# Patient Record
Sex: FEMALE | Race: WHITE | HISPANIC OR LATINO | URBAN - METROPOLITAN AREA
[De-identification: names, ages, dates, MRNs, and addresses within clinical notes are randomized per-mention and may not be internally consistent; named-entity substitution may affect disease eponyms.]

---

## 2017-06-13 ENCOUNTER — OUTPATIENT (OUTPATIENT)
Dept: OUTPATIENT SERVICES | Facility: HOSPITAL | Age: 52
LOS: 1 days | Discharge: HOME | End: 2017-06-13

## 2017-06-28 DIAGNOSIS — G56.01 CARPAL TUNNEL SYNDROME, RIGHT UPPER LIMB: ICD-10-CM

## 2017-06-28 DIAGNOSIS — Z01.818 ENCOUNTER FOR OTHER PREPROCEDURAL EXAMINATION: ICD-10-CM

## 2017-07-14 ENCOUNTER — OUTPATIENT (OUTPATIENT)
Dept: ADMINISTRATIVE | Facility: HOSPITAL | Age: 52
LOS: 1 days | Discharge: HOME | End: 2017-07-14

## 2017-07-18 DIAGNOSIS — G56.01 CARPAL TUNNEL SYNDROME, RIGHT UPPER LIMB: ICD-10-CM

## 2017-12-19 PROBLEM — Z00.00 ENCOUNTER FOR PREVENTIVE HEALTH EXAMINATION: Status: ACTIVE | Noted: 2017-12-19

## 2018-02-27 ENCOUNTER — OUTPATIENT (OUTPATIENT)
Dept: OUTPATIENT SERVICES | Facility: HOSPITAL | Age: 53
LOS: 1 days | Discharge: HOME | End: 2018-02-27

## 2018-02-27 VITALS
TEMPERATURE: 98 F | HEART RATE: 76 BPM | HEIGHT: 61 IN | RESPIRATION RATE: 16 BRPM | SYSTOLIC BLOOD PRESSURE: 112 MMHG | WEIGHT: 121.25 LBS | DIASTOLIC BLOOD PRESSURE: 68 MMHG | OXYGEN SATURATION: 99 %

## 2018-02-27 DIAGNOSIS — Z98.890 OTHER SPECIFIED POSTPROCEDURAL STATES: Chronic | ICD-10-CM

## 2018-02-27 LAB
ANION GAP SERPL CALC-SCNC: 8 MMOL/L — SIGNIFICANT CHANGE UP (ref 7–14)
BASOPHILS # BLD AUTO: 0.04 K/UL — SIGNIFICANT CHANGE UP (ref 0–0.2)
BASOPHILS NFR BLD AUTO: 0.6 % — SIGNIFICANT CHANGE UP (ref 0–1)
BUN SERPL-MCNC: 13 MG/DL — SIGNIFICANT CHANGE UP (ref 10–20)
CALCIUM SERPL-MCNC: 9.9 MG/DL — SIGNIFICANT CHANGE UP (ref 8.5–10.1)
CHLORIDE SERPL-SCNC: 105 MMOL/L — SIGNIFICANT CHANGE UP (ref 98–110)
CO2 SERPL-SCNC: 28 MMOL/L — SIGNIFICANT CHANGE UP (ref 17–32)
CREAT SERPL-MCNC: 0.7 MG/DL — SIGNIFICANT CHANGE UP (ref 0.7–1.5)
EOSINOPHIL # BLD AUTO: 0.14 K/UL — SIGNIFICANT CHANGE UP (ref 0–0.7)
EOSINOPHIL NFR BLD AUTO: 2.1 % — SIGNIFICANT CHANGE UP (ref 0–8)
GLUCOSE SERPL-MCNC: 82 MG/DL — SIGNIFICANT CHANGE UP (ref 70–110)
HCT VFR BLD CALC: 37.4 % — SIGNIFICANT CHANGE UP (ref 37–47)
HGB BLD-MCNC: 13 G/DL — LOW (ref 14–18)
IMM GRANULOCYTES NFR BLD AUTO: 0.2 % — SIGNIFICANT CHANGE UP (ref 0.1–0.3)
LYMPHOCYTES # BLD AUTO: 2.41 K/UL — SIGNIFICANT CHANGE UP (ref 1.2–3.4)
LYMPHOCYTES # BLD AUTO: 36.7 % — SIGNIFICANT CHANGE UP (ref 20.5–51.1)
MCHC RBC-ENTMCNC: 30.2 PG — SIGNIFICANT CHANGE UP (ref 27–31)
MCHC RBC-ENTMCNC: 34.8 G/DL — SIGNIFICANT CHANGE UP (ref 32–37)
MCV RBC AUTO: 87 FL — SIGNIFICANT CHANGE UP (ref 81–91)
MONOCYTES # BLD AUTO: 0.35 K/UL — SIGNIFICANT CHANGE UP (ref 0.1–0.6)
MONOCYTES NFR BLD AUTO: 5.3 % — SIGNIFICANT CHANGE UP (ref 1.7–9.3)
NEUTROPHILS # BLD AUTO: 3.62 K/UL — SIGNIFICANT CHANGE UP (ref 1.4–6.5)
NEUTROPHILS NFR BLD AUTO: 55.1 % — SIGNIFICANT CHANGE UP (ref 42.2–75.2)
NRBC # BLD: 0 /100 WBCS — SIGNIFICANT CHANGE UP (ref 0–0)
PLATELET # BLD AUTO: 242 K/UL — SIGNIFICANT CHANGE UP (ref 130–400)
POTASSIUM SERPL-MCNC: 4.1 MMOL/L — SIGNIFICANT CHANGE UP (ref 3.5–5)
POTASSIUM SERPL-SCNC: 4.1 MMOL/L — SIGNIFICANT CHANGE UP (ref 3.5–5)
RBC # BLD: 4.3 M/UL — SIGNIFICANT CHANGE UP (ref 4.2–5.4)
RBC # FLD: 12.4 % — SIGNIFICANT CHANGE UP (ref 11.5–14.5)
SODIUM SERPL-SCNC: 141 MMOL/L — SIGNIFICANT CHANGE UP (ref 135–146)
WBC # BLD: 6.57 K/UL — SIGNIFICANT CHANGE UP (ref 4.8–10.8)
WBC # FLD AUTO: 6.57 K/UL — SIGNIFICANT CHANGE UP (ref 4.8–10.8)

## 2018-02-27 NOTE — H&P PST ADULT - NSANTHOSAYNRD_GEN_A_CORE
No. BETTY screening performed.  STOP BANG Legend: 0-2 = LOW Risk; 3-4 = INTERMEDIATE Risk; 5-8 = HIGH Risk

## 2018-02-27 NOTE — H&P PST ADULT - REASON FOR ADMISSION
53 yr old female to past for left open carpal tunnel release  under iv sed with dr perry rt hand dominant s/p  rct 5/17 glenda well now for left   no fever no uti uti cp palp sob pain at this time ex glenda 3 fos [employer]  no david screen revd

## 2018-03-02 DIAGNOSIS — Z01.818 ENCOUNTER FOR OTHER PREPROCEDURAL EXAMINATION: ICD-10-CM

## 2018-03-07 ENCOUNTER — OUTPATIENT (OUTPATIENT)
Dept: OUTPATIENT SERVICES | Facility: HOSPITAL | Age: 53
LOS: 1 days | Discharge: HOME | End: 2018-03-07

## 2018-03-07 ENCOUNTER — APPOINTMENT (OUTPATIENT)
Dept: PLASTIC SURGERY | Facility: CLINIC | Age: 53
End: 2018-03-07

## 2018-03-07 VITALS
SYSTOLIC BLOOD PRESSURE: 106 MMHG | OXYGEN SATURATION: 96 % | DIASTOLIC BLOOD PRESSURE: 65 MMHG | HEART RATE: 73 BPM | RESPIRATION RATE: 17 BRPM

## 2018-03-07 VITALS
HEART RATE: 68 BPM | TEMPERATURE: 98 F | WEIGHT: 121.25 LBS | DIASTOLIC BLOOD PRESSURE: 55 MMHG | OXYGEN SATURATION: 98 % | HEIGHT: 61 IN | RESPIRATION RATE: 20 BRPM | SYSTOLIC BLOOD PRESSURE: 102 MMHG

## 2018-03-07 DIAGNOSIS — Z98.890 OTHER SPECIFIED POSTPROCEDURAL STATES: Chronic | ICD-10-CM

## 2018-03-07 RX ORDER — MORPHINE SULFATE 50 MG/1
2 CAPSULE, EXTENDED RELEASE ORAL
Qty: 0 | Refills: 0 | Status: DISCONTINUED | OUTPATIENT
Start: 2018-03-07 | End: 2018-03-07

## 2018-03-07 RX ORDER — IBUPROFEN 200 MG
1 TABLET ORAL
Qty: 20 | Refills: 0 | OUTPATIENT
Start: 2018-03-07

## 2018-03-07 RX ORDER — SODIUM CHLORIDE 9 MG/ML
1000 INJECTION, SOLUTION INTRAVENOUS
Qty: 0 | Refills: 0 | Status: DISCONTINUED | OUTPATIENT
Start: 2018-03-07 | End: 2018-03-22

## 2018-03-07 RX ORDER — ONDANSETRON 8 MG/1
4 TABLET, FILM COATED ORAL ONCE
Qty: 0 | Refills: 0 | Status: DISCONTINUED | OUTPATIENT
Start: 2018-03-07 | End: 2018-03-22

## 2018-03-07 RX ORDER — OXYCODONE AND ACETAMINOPHEN 5; 325 MG/1; MG/1
1 TABLET ORAL ONCE
Qty: 0 | Refills: 0 | Status: DISCONTINUED | OUTPATIENT
Start: 2018-03-07 | End: 2018-03-07

## 2018-03-07 RX ORDER — MEPERIDINE HYDROCHLORIDE 50 MG/ML
12.5 INJECTION INTRAMUSCULAR; INTRAVENOUS; SUBCUTANEOUS ONCE
Qty: 0 | Refills: 0 | Status: DISCONTINUED | OUTPATIENT
Start: 2018-03-07 | End: 2018-03-07

## 2018-03-07 RX ADMIN — SODIUM CHLORIDE 100 MILLILITER(S): 9 INJECTION, SOLUTION INTRAVENOUS at 12:02

## 2018-03-07 NOTE — BRIEF OPERATIVE NOTE - PROCEDURE
<<-----Click on this checkbox to enter Procedure Open release of left carpal tunnel  03/07/2018    Active  BENJAMIN

## 2018-03-07 NOTE — ASU DISCHARGE PLAN (ADULT/PEDIATRIC). - SPECIAL INSTRUCTIONS
DIET:    Resume normal diet  No alcoholic  beverages for 24 hours or if on prescribed narcotic pain medications.    MEDICATION:    Resume your preoperative oral medications.  Check with your physician before starting aspirin, Coumadin, or other blood thinners.  Prescriptions given to you - take as directed.      ACTIVITY:    Rest today and limit your activities for 24 hours.  Do not drive or operate machinery for 24 hours - if you received anesthesia.  When taking pain medication, be careful as you walk or climb stairs.  It is not advisable to drive while taking prescribed pain medication.    SPECIAL INSTRUCTIONS:    ___x__ Elevate operative site above heart level or as directed.  ___x__ Apply ice to operative site as directed.  _____ Use  sling as directed.  ___x__ Exercise fingers.    DRESSING CARE:    _x____ You may change the dressing 4 days. Keep wound covered with band-aids.  _____ Do not change the dressing until your doctor see you.  _____ You can loosen or rewrap the dressing.  _____  Keep dressing clean and dry.  _____ You may shower in _____ day(s) with the extremity covered by a plastic bag.  ___x__ OK to wash hand , including showers, in _____ day(s).    ADDITIONAL  INFORMATION:    Post operative visit should be scheduled for next week.  If you are not aware of visit please contact office.  If you have any questions or concerns call office at      Notify your doctor if you develop   Fever  Excessive Swelling  Chills   Drainage   Pain not controlled by medication  Persistent numbness in hand or fingers    If an Emergency arises call 911 and/or go to the Emergency Room

## 2018-03-12 DIAGNOSIS — G56.02 CARPAL TUNNEL SYNDROME, LEFT UPPER LIMB: ICD-10-CM

## 2019-04-17 ENCOUNTER — EMERGENCY (EMERGENCY)
Facility: HOSPITAL | Age: 54
LOS: 0 days | Discharge: HOME | End: 2019-04-18
Attending: EMERGENCY MEDICINE | Admitting: EMERGENCY MEDICINE
Payer: MEDICARE

## 2019-04-17 VITALS
HEART RATE: 69 BPM | OXYGEN SATURATION: 99 % | TEMPERATURE: 99 F | SYSTOLIC BLOOD PRESSURE: 103 MMHG | DIASTOLIC BLOOD PRESSURE: 57 MMHG | HEIGHT: 61 IN | WEIGHT: 130.07 LBS | RESPIRATION RATE: 18 BRPM

## 2019-04-17 DIAGNOSIS — J45.909 UNSPECIFIED ASTHMA, UNCOMPLICATED: ICD-10-CM

## 2019-04-17 DIAGNOSIS — K52.9 NONINFECTIVE GASTROENTERITIS AND COLITIS, UNSPECIFIED: ICD-10-CM

## 2019-04-17 DIAGNOSIS — Z79.891 LONG TERM (CURRENT) USE OF OPIATE ANALGESIC: ICD-10-CM

## 2019-04-17 DIAGNOSIS — Z87.39 PERSONAL HISTORY OF OTHER DISEASES OF THE MUSCULOSKELETAL SYSTEM AND CONNECTIVE TISSUE: ICD-10-CM

## 2019-04-17 DIAGNOSIS — Z98.890 OTHER SPECIFIED POSTPROCEDURAL STATES: Chronic | ICD-10-CM

## 2019-04-17 DIAGNOSIS — R10.9 UNSPECIFIED ABDOMINAL PAIN: ICD-10-CM

## 2019-04-17 DIAGNOSIS — Z79.2 LONG TERM (CURRENT) USE OF ANTIBIOTICS: ICD-10-CM

## 2019-04-17 DIAGNOSIS — Z79.1 LONG TERM (CURRENT) USE OF NON-STEROIDAL ANTI-INFLAMMATORIES (NSAID): ICD-10-CM

## 2019-04-17 LAB
ALBUMIN SERPL ELPH-MCNC: 4.9 G/DL — SIGNIFICANT CHANGE UP (ref 3.5–5.2)
ALP SERPL-CCNC: 73 U/L — SIGNIFICANT CHANGE UP (ref 30–115)
ALT FLD-CCNC: 14 U/L — SIGNIFICANT CHANGE UP (ref 0–41)
ANION GAP SERPL CALC-SCNC: 12 MMOL/L — SIGNIFICANT CHANGE UP (ref 7–14)
APPEARANCE UR: CLEAR — SIGNIFICANT CHANGE UP
AST SERPL-CCNC: 16 U/L — SIGNIFICANT CHANGE UP (ref 0–41)
BACTERIA # UR AUTO: ABNORMAL
BASOPHILS # BLD AUTO: 0.05 K/UL — SIGNIFICANT CHANGE UP (ref 0–0.2)
BASOPHILS NFR BLD AUTO: 0.9 % — SIGNIFICANT CHANGE UP (ref 0–1)
BILIRUB SERPL-MCNC: 0.2 MG/DL — SIGNIFICANT CHANGE UP (ref 0.2–1.2)
BILIRUB UR-MCNC: NEGATIVE — SIGNIFICANT CHANGE UP
BUN SERPL-MCNC: 17 MG/DL — SIGNIFICANT CHANGE UP (ref 10–20)
CALCIUM SERPL-MCNC: 10.4 MG/DL — HIGH (ref 8.5–10.1)
CHLORIDE SERPL-SCNC: 101 MMOL/L — SIGNIFICANT CHANGE UP (ref 98–110)
CO2 SERPL-SCNC: 31 MMOL/L — SIGNIFICANT CHANGE UP (ref 17–32)
COD CRY URNS QL: NEGATIVE — SIGNIFICANT CHANGE UP
COLOR SPEC: YELLOW — SIGNIFICANT CHANGE UP
CREAT SERPL-MCNC: 1 MG/DL — SIGNIFICANT CHANGE UP (ref 0.7–1.5)
DIFF PNL FLD: ABNORMAL
EOSINOPHIL # BLD AUTO: 0.23 K/UL — SIGNIFICANT CHANGE UP (ref 0–0.7)
EOSINOPHIL NFR BLD AUTO: 4.1 % — SIGNIFICANT CHANGE UP (ref 0–8)
EPI CELLS # UR: ABNORMAL /HPF
GLUCOSE SERPL-MCNC: 103 MG/DL — HIGH (ref 70–99)
GLUCOSE UR QL: NEGATIVE MG/DL — SIGNIFICANT CHANGE UP
GRAN CASTS # UR COMP ASSIST: NEGATIVE — SIGNIFICANT CHANGE UP
HCT VFR BLD CALC: 40.7 % — SIGNIFICANT CHANGE UP (ref 37–47)
HGB BLD-MCNC: 13.6 G/DL — SIGNIFICANT CHANGE UP (ref 12–16)
HYALINE CASTS # UR AUTO: NEGATIVE — SIGNIFICANT CHANGE UP
IMM GRANULOCYTES NFR BLD AUTO: 0.2 % — SIGNIFICANT CHANGE UP (ref 0.1–0.3)
KETONES UR-MCNC: NEGATIVE — SIGNIFICANT CHANGE UP
LEUKOCYTE ESTERASE UR-ACNC: ABNORMAL
LIDOCAIN IGE QN: 29 U/L — SIGNIFICANT CHANGE UP (ref 7–60)
LYMPHOCYTES # BLD AUTO: 2.23 K/UL — SIGNIFICANT CHANGE UP (ref 1.2–3.4)
LYMPHOCYTES # BLD AUTO: 39.6 % — SIGNIFICANT CHANGE UP (ref 20.5–51.1)
MCHC RBC-ENTMCNC: 29.8 PG — SIGNIFICANT CHANGE UP (ref 27–31)
MCHC RBC-ENTMCNC: 33.4 G/DL — SIGNIFICANT CHANGE UP (ref 32–37)
MCV RBC AUTO: 89.3 FL — SIGNIFICANT CHANGE UP (ref 81–99)
MONOCYTES # BLD AUTO: 0.33 K/UL — SIGNIFICANT CHANGE UP (ref 0.1–0.6)
MONOCYTES NFR BLD AUTO: 5.9 % — SIGNIFICANT CHANGE UP (ref 1.7–9.3)
NEUTROPHILS # BLD AUTO: 2.78 K/UL — SIGNIFICANT CHANGE UP (ref 1.4–6.5)
NEUTROPHILS NFR BLD AUTO: 49.3 % — SIGNIFICANT CHANGE UP (ref 42.2–75.2)
NITRITE UR-MCNC: NEGATIVE — SIGNIFICANT CHANGE UP
NRBC # BLD: 0 /100 WBCS — SIGNIFICANT CHANGE UP (ref 0–0)
PH UR: 5.5 — SIGNIFICANT CHANGE UP (ref 5–8)
PLATELET # BLD AUTO: 243 K/UL — SIGNIFICANT CHANGE UP (ref 130–400)
POTASSIUM SERPL-MCNC: 3.9 MMOL/L — SIGNIFICANT CHANGE UP (ref 3.5–5)
POTASSIUM SERPL-SCNC: 3.9 MMOL/L — SIGNIFICANT CHANGE UP (ref 3.5–5)
PROT SERPL-MCNC: 7.9 G/DL — SIGNIFICANT CHANGE UP (ref 6–8)
PROT UR-MCNC: NEGATIVE MG/DL — SIGNIFICANT CHANGE UP
RBC # BLD: 4.56 M/UL — SIGNIFICANT CHANGE UP (ref 4.2–5.4)
RBC # FLD: 12.6 % — SIGNIFICANT CHANGE UP (ref 11.5–14.5)
RBC CASTS # UR COMP ASSIST: SIGNIFICANT CHANGE UP /HPF
SODIUM SERPL-SCNC: 144 MMOL/L — SIGNIFICANT CHANGE UP (ref 135–146)
SP GR SPEC: >=1.03 (ref 1.01–1.03)
TRI-PHOS CRY UR QL COMP ASSIST: NEGATIVE — SIGNIFICANT CHANGE UP
URATE CRY FLD QL MICRO: NEGATIVE — SIGNIFICANT CHANGE UP
UROBILINOGEN FLD QL: 0.2 MG/DL — SIGNIFICANT CHANGE UP (ref 0.2–0.2)
WBC # BLD: 5.63 K/UL — SIGNIFICANT CHANGE UP (ref 4.8–10.8)
WBC # FLD AUTO: 5.63 K/UL — SIGNIFICANT CHANGE UP (ref 4.8–10.8)
WBC UR QL: ABNORMAL /HPF

## 2019-04-17 PROCEDURE — 99285 EMERGENCY DEPT VISIT HI MDM: CPT

## 2019-04-17 RX ORDER — MORPHINE SULFATE 50 MG/1
4 CAPSULE, EXTENDED RELEASE ORAL ONCE
Qty: 0 | Refills: 0 | Status: DISCONTINUED | OUTPATIENT
Start: 2019-04-17 | End: 2019-04-17

## 2019-04-17 RX ADMIN — MORPHINE SULFATE 4 MILLIGRAM(S): 50 CAPSULE, EXTENDED RELEASE ORAL at 22:12

## 2019-04-17 NOTE — ED ADULT NURSE NOTE - TEMPLATE
AP STANDING VIEW OF BOTH KNEES; 3 VIEWS RIGHT KNEE



CLINICAL HISTORY: Chronic right knee pain.



FINDINGS: An AP standing view of both knees with lateral, tunnel, and sunrise

views of the right knee are compared to study dated 4/13/2015. The skeletal

structures are osteopenic. No fracture is seen. There is moderate to advanced

tricompartmental degenerative joint space narrowing. This is greatest in the

medial and patellofemoral compartments. There are large marginal osteophytes and

patellar enthesophytes. There is no osteochondral defect seen on the tunnel

image. A small joint effusion is suspected. Prepatellar soft tissue swelling is

noted. Survey images of the left knee on the frontal view show moderate

narrowing in the medial compartment and mild narrowing the lateral compartment

as well as marginal osteophytes.



IMPRESSION:



1. Small joint effusion and soft tissue swelling with no acute bony abnormality

seen in the right knee.



2. Osteopenia and arthritic change as above. This has modestly progressed from

the 2015 examination.



3. Arthritic change is also seen in the left knee on the frontal view.







Electronically signed by:  Jatinder Salazar M.D.

9/7/2017 4:02 PM



Dictated Date/Time:  9/7/2017 3:59 PM Abdominal Pain, N/V/D

## 2019-04-17 NOTE — ED PROVIDER NOTE - ATTENDING CONTRIBUTION TO CARE
52 yo F PMHx chronic pain presents with c/o left sided abd pain on and off x few days.  Pain comes and goes and feels sharp at times, no fever or chills, no n/v, + loose stools.  no urinary complaints.  no travel, no trauma.  On exam pt in NAD AAO x 3, MMM and pink, Lungs CAT B/L no wrr, no rash, Abd soft + BS + tender left abdomen, no edema, seen ambulate with steady gait.

## 2019-04-17 NOTE — ED PROVIDER NOTE - NSFOLLOWUPINSTRUCTIONS_ED_ALL_ED_FT
Colitis  ImageColitis is inflammation of the colon. Colitis may last a short time (acute) or it may last a long time (chronic).    What are the causes?  This condition may be caused by:    Viruses.  Bacteria.  Reactions to medicine.  Certain autoimmune diseases, such as Crohn disease or ulcerative colitis.    What are the signs or symptoms?  Symptoms of this condition include:    Diarrhea.  Passing bloody or tarry stool.  Pain.  Fever.  Vomiting.  Tiredness (fatigue).  Weight loss.  Bloating.  Sudden increase in abdominal pain.  Having fewer bowel movements than usual.    How is this diagnosed?  This condition is diagnosed with a stool test or a blood test. You may also have other tests, including X-rays, a CT scan, or a colonoscopy.    How is this treated?  Treatment may include:    Resting the bowel. This involves not eating or drinking for a period of time.  Fluids that are given through an IV tube.  Medicine for pain and diarrhea.  Antibiotic medicines.  Cortisone medicines.  Surgery.    Follow these instructions at home:  Eating and drinking     Follow instructions from your health care provider about eating or drinking restrictions.  Drink enough fluid to keep your urine clear or pale yellow.  Work with a dietitian to determine which foods cause your condition to flare up.  Avoid foods that cause flare-ups.  Eat a well-balanced diet.  Medicines     Take over-the-counter and prescription medicines only as told by your health care provider.  If you were prescribed an antibiotic medicine, take it as told by your health care provider. Do not stop taking the antibiotic even if you start to feel better.  General instructions     Keep all follow-up visits as told by your health care provider. This is important.  Contact a health care provider if:  Your symptoms do not go away.  You develop new symptoms.  Get help right away if:  You have a fever that does not go away with treatment.  You develop chills.  You have extreme weakness, fainting, or dehydration.  You have repeated vomiting.  You develop severe pain in your abdomen.  You pass bloody or tarry stool.  This information is not intended to replace advice given to you by your health care provider. Make sure you discuss any questions you have with your health care provider

## 2019-04-17 NOTE — ED PROVIDER NOTE - PHYSICAL EXAMINATION
--EXAM--  VITAL SIGNS: I have reviewed vs documented at present.  CONSTITUTIONAL: Well-developed; well-nourished; in no acute distress.   SKIN: Warm and dry, no acute rash.   HEAD: Normocephalic; atraumatic.  EYES: PERRL, EOM intact; conjunctiva and sclera clear. No nystagmus.  ENT: No nasal discharge; airway clear.  NECK: Supple; non tender.  CARD: S1, S2, Regular rate and rhythm.   RESP: No wheezes, rales or rhonchi.  ABD: Normal bowel sounds; soft; non-distended; tender left side   EXT: Normal ROM.   NEURO: Alert, oriented, grossly unremarkable. Strength 5/5 in all extremities. Sensation intact throughout.  PSYCH: Cooperative, appropriate.

## 2019-04-17 NOTE — ED PROVIDER NOTE - OBJECTIVE STATEMENT
this is a 52 yo female presenting to ed for evaluation of abdominal pain.  patient states she has had pain on left side for past couple of days. patient states pain is intermittent in nature. patient denies any nausea or vomiting. patient admits to couple episodes of loose stool

## 2019-04-17 NOTE — ED PROVIDER NOTE - CLINICAL SUMMARY MEDICAL DECISION MAKING FREE TEXT BOX
pt pw  llq pain -  labs wnl -  CT with  colitis-   po abx jessica  discussed and pritned results   - mervat castillo given  Patient to be discharged from ED. Any available test results were discussed with and printed  for patient.  Verbal instructions given, including instructions to return to ED immediately for any new, worsening, or concerning symptoms. Patient endorsed understanding. Written discharge instructions additionally given, including follow-up plan.  pt well appearing dcd in stable condition  feeling better

## 2019-04-17 NOTE — ED ADULT NURSE NOTE - NSIMPLEMENTINTERV_GEN_ALL_ED
Implemented All Universal Safety Interventions:  [place] to call system. Call bell, personal items and telephone within reach. Instruct patient to call for assistance. Room bathroom lighting operational. Non-slip footwear when patient is off stretcher. Physically safe environment: no spills, clutter or unnecessary equipment. Stretcher in lowest position, wheels locked, appropriate side rails in place.

## 2019-04-17 NOTE — ED PROVIDER NOTE - CARE PROVIDER_API CALL
Jake Piedra (DO)  Gastroenterology  [address] [place] [id number]  Phone: [phone]  Fax: [phone]  Follow Up Time:

## 2019-04-18 VITALS
TEMPERATURE: 98 F | SYSTOLIC BLOOD PRESSURE: 112 MMHG | RESPIRATION RATE: 18 BRPM | HEART RATE: 72 BPM | OXYGEN SATURATION: 98 % | DIASTOLIC BLOOD PRESSURE: 62 MMHG

## 2019-04-18 PROBLEM — G56.02 CARPAL TUNNEL SYNDROME, LEFT UPPER LIMB: Chronic | Status: ACTIVE | Noted: 2018-02-27

## 2019-04-18 PROBLEM — J45.909 UNSPECIFIED ASTHMA, UNCOMPLICATED: Chronic | Status: ACTIVE | Noted: 2018-02-27

## 2019-04-18 PROCEDURE — 74177 CT ABD & PELVIS W/CONTRAST: CPT | Mod: 26

## 2019-04-18 RX ORDER — MOXIFLOXACIN HYDROCHLORIDE TABLETS, 400 MG 400 MG/1
1 TABLET, FILM COATED ORAL
Qty: 20 | Refills: 0 | OUTPATIENT
Start: 2019-04-18 | End: 2019-04-27

## 2019-04-18 RX ORDER — METRONIDAZOLE 500 MG
1 TABLET ORAL
Qty: 21 | Refills: 0 | OUTPATIENT
Start: 2019-04-18 | End: 2019-04-24

## 2019-04-18 RX ORDER — CIPROFLOXACIN LACTATE 400MG/40ML
400 VIAL (ML) INTRAVENOUS ONCE
Qty: 0 | Refills: 0 | Status: DISCONTINUED | OUTPATIENT
Start: 2019-04-18 | End: 2019-04-18

## 2019-04-18 RX ORDER — METRONIDAZOLE 500 MG
500 TABLET ORAL ONCE
Qty: 0 | Refills: 0 | Status: COMPLETED | OUTPATIENT
Start: 2019-04-18 | End: 2019-04-18

## 2019-04-18 RX ORDER — CIPROFLOXACIN LACTATE 400MG/40ML
500 VIAL (ML) INTRAVENOUS ONCE
Qty: 0 | Refills: 0 | Status: COMPLETED | OUTPATIENT
Start: 2019-04-18 | End: 2019-04-18

## 2019-04-18 RX ADMIN — Medication 500 MILLIGRAM(S): at 02:24

## 2019-04-18 RX ADMIN — Medication 500 MILLIGRAM(S): at 02:31

## 2019-04-18 NOTE — ED POST DISCHARGE NOTE - RESULT SUMMARY
CT ABD- DR WILKS CALLED- ADDENDUM TO REPORT: DOES NOT THINKS PATIENT HAS COLITIS. THINKS ITS EPIPLOIC APPENDAGITIS. PATIENT ON ABX, WILL DISCUSS WITH DR. PAZ.

## 2019-04-19 LAB
CULTURE RESULTS: SIGNIFICANT CHANGE UP
SPECIMEN SOURCE: SIGNIFICANT CHANGE UP

## 2019-05-06 ENCOUNTER — TRANSCRIPTION ENCOUNTER (OUTPATIENT)
Age: 54
End: 2019-05-06

## 2019-05-06 ENCOUNTER — OUTPATIENT (OUTPATIENT)
Dept: OUTPATIENT SERVICES | Facility: HOSPITAL | Age: 54
LOS: 1 days | Discharge: HOME | End: 2019-05-06

## 2019-05-06 VITALS
DIASTOLIC BLOOD PRESSURE: 75 MMHG | RESPIRATION RATE: 18 BRPM | TEMPERATURE: 98 F | HEIGHT: 61 IN | SYSTOLIC BLOOD PRESSURE: 106 MMHG | WEIGHT: 130.07 LBS | HEART RATE: 74 BPM

## 2019-05-06 VITALS — SYSTOLIC BLOOD PRESSURE: 109 MMHG | DIASTOLIC BLOOD PRESSURE: 76 MMHG | HEART RATE: 72 BPM | RESPIRATION RATE: 18 BRPM

## 2019-05-06 DIAGNOSIS — Z98.890 OTHER SPECIFIED POSTPROCEDURAL STATES: Chronic | ICD-10-CM

## 2019-05-06 NOTE — ASU DISCHARGE PLAN (ADULT/PEDIATRIC) - CALL YOUR DOCTOR IF YOU HAVE ANY OF THE FOLLOWING:
Pain not relieved by Medications/Inability to tolerate liquids or foods/Bleeding that does not stop/Nausea and vomiting that does not stop

## 2019-05-06 NOTE — H&P PST ADULT - ASSESSMENT
54 yo F with PMH mentioned here for Screening colonoscopy.   Risks and benefits discussed with the patient.   Will proceed with the scheduled case.

## 2019-05-09 DIAGNOSIS — Z12.11 ENCOUNTER FOR SCREENING FOR MALIGNANT NEOPLASM OF COLON: ICD-10-CM

## 2019-05-09 DIAGNOSIS — K64.8 OTHER HEMORRHOIDS: ICD-10-CM

## 2019-05-09 DIAGNOSIS — J45.909 UNSPECIFIED ASTHMA, UNCOMPLICATED: ICD-10-CM

## 2019-05-09 DIAGNOSIS — Z87.891 PERSONAL HISTORY OF NICOTINE DEPENDENCE: ICD-10-CM

## 2019-07-01 ENCOUNTER — EMERGENCY (EMERGENCY)
Facility: HOSPITAL | Age: 54
LOS: 0 days | Discharge: HOME | End: 2019-07-02
Attending: EMERGENCY MEDICINE | Admitting: EMERGENCY MEDICINE
Payer: MEDICARE

## 2019-07-01 VITALS
SYSTOLIC BLOOD PRESSURE: 101 MMHG | HEART RATE: 65 BPM | HEIGHT: 61 IN | OXYGEN SATURATION: 100 % | WEIGHT: 128.97 LBS | RESPIRATION RATE: 18 BRPM | TEMPERATURE: 97 F | DIASTOLIC BLOOD PRESSURE: 50 MMHG

## 2019-07-01 DIAGNOSIS — R11.2 NAUSEA WITH VOMITING, UNSPECIFIED: ICD-10-CM

## 2019-07-01 DIAGNOSIS — Z79.891 LONG TERM (CURRENT) USE OF OPIATE ANALGESIC: ICD-10-CM

## 2019-07-01 DIAGNOSIS — Z79.899 OTHER LONG TERM (CURRENT) DRUG THERAPY: ICD-10-CM

## 2019-07-01 DIAGNOSIS — N20.0 CALCULUS OF KIDNEY: ICD-10-CM

## 2019-07-01 DIAGNOSIS — Z98.890 OTHER SPECIFIED POSTPROCEDURAL STATES: Chronic | ICD-10-CM

## 2019-07-01 DIAGNOSIS — R10.9 UNSPECIFIED ABDOMINAL PAIN: ICD-10-CM

## 2019-07-01 LAB
ALBUMIN SERPL ELPH-MCNC: 4.6 G/DL — SIGNIFICANT CHANGE UP (ref 3.5–5.2)
ALP SERPL-CCNC: 64 U/L — SIGNIFICANT CHANGE UP (ref 30–115)
ALT FLD-CCNC: 16 U/L — SIGNIFICANT CHANGE UP (ref 0–41)
ANION GAP SERPL CALC-SCNC: 12 MMOL/L — SIGNIFICANT CHANGE UP (ref 7–14)
APPEARANCE UR: CLEAR — SIGNIFICANT CHANGE UP
AST SERPL-CCNC: 23 U/L — SIGNIFICANT CHANGE UP (ref 0–41)
BASOPHILS # BLD AUTO: 0.03 K/UL — SIGNIFICANT CHANGE UP (ref 0–0.2)
BASOPHILS NFR BLD AUTO: 0.3 % — SIGNIFICANT CHANGE UP (ref 0–1)
BILIRUB SERPL-MCNC: 0.6 MG/DL — SIGNIFICANT CHANGE UP (ref 0.2–1.2)
BILIRUB UR-MCNC: NEGATIVE — SIGNIFICANT CHANGE UP
BUN SERPL-MCNC: 17 MG/DL — SIGNIFICANT CHANGE UP (ref 10–20)
CALCIUM SERPL-MCNC: 9.2 MG/DL — SIGNIFICANT CHANGE UP (ref 8.5–10.1)
CHLORIDE SERPL-SCNC: 103 MMOL/L — SIGNIFICANT CHANGE UP (ref 98–110)
CO2 SERPL-SCNC: 24 MMOL/L — SIGNIFICANT CHANGE UP (ref 17–32)
COLOR SPEC: YELLOW — SIGNIFICANT CHANGE UP
CREAT SERPL-MCNC: 1 MG/DL — SIGNIFICANT CHANGE UP (ref 0.7–1.5)
DIFF PNL FLD: ABNORMAL
EOSINOPHIL # BLD AUTO: 0.04 K/UL — SIGNIFICANT CHANGE UP (ref 0–0.7)
EOSINOPHIL NFR BLD AUTO: 0.4 % — SIGNIFICANT CHANGE UP (ref 0–8)
GLUCOSE SERPL-MCNC: 99 MG/DL — SIGNIFICANT CHANGE UP (ref 70–99)
GLUCOSE UR QL: NEGATIVE MG/DL — SIGNIFICANT CHANGE UP
HCT VFR BLD CALC: 39.4 % — SIGNIFICANT CHANGE UP (ref 37–47)
HGB BLD-MCNC: 13 G/DL — SIGNIFICANT CHANGE UP (ref 12–16)
IMM GRANULOCYTES NFR BLD AUTO: 0.4 % — HIGH (ref 0.1–0.3)
KETONES UR-MCNC: 15
LEUKOCYTE ESTERASE UR-ACNC: NEGATIVE — SIGNIFICANT CHANGE UP
LYMPHOCYTES # BLD AUTO: 1.23 K/UL — SIGNIFICANT CHANGE UP (ref 1.2–3.4)
LYMPHOCYTES # BLD AUTO: 11.6 % — LOW (ref 20.5–51.1)
MCHC RBC-ENTMCNC: 29.5 PG — SIGNIFICANT CHANGE UP (ref 27–31)
MCHC RBC-ENTMCNC: 33 G/DL — SIGNIFICANT CHANGE UP (ref 32–37)
MCV RBC AUTO: 89.5 FL — SIGNIFICANT CHANGE UP (ref 81–99)
MONOCYTES # BLD AUTO: 0.48 K/UL — SIGNIFICANT CHANGE UP (ref 0.1–0.6)
MONOCYTES NFR BLD AUTO: 4.5 % — SIGNIFICANT CHANGE UP (ref 1.7–9.3)
NEUTROPHILS # BLD AUTO: 8.8 K/UL — HIGH (ref 1.4–6.5)
NEUTROPHILS NFR BLD AUTO: 82.8 % — HIGH (ref 42.2–75.2)
NITRITE UR-MCNC: NEGATIVE — SIGNIFICANT CHANGE UP
NRBC # BLD: 0 /100 WBCS — SIGNIFICANT CHANGE UP (ref 0–0)
PH UR: 6 — SIGNIFICANT CHANGE UP (ref 5–8)
PLATELET # BLD AUTO: 223 K/UL — SIGNIFICANT CHANGE UP (ref 130–400)
POTASSIUM SERPL-MCNC: 4.5 MMOL/L — SIGNIFICANT CHANGE UP (ref 3.5–5)
POTASSIUM SERPL-SCNC: 4.5 MMOL/L — SIGNIFICANT CHANGE UP (ref 3.5–5)
PROT SERPL-MCNC: 7 G/DL — SIGNIFICANT CHANGE UP (ref 6–8)
PROT UR-MCNC: ABNORMAL MG/DL
RBC # BLD: 4.4 M/UL — SIGNIFICANT CHANGE UP (ref 4.2–5.4)
RBC # FLD: 12.2 % — SIGNIFICANT CHANGE UP (ref 11.5–14.5)
SODIUM SERPL-SCNC: 139 MMOL/L — SIGNIFICANT CHANGE UP (ref 135–146)
SP GR SPEC: 1.01 — SIGNIFICANT CHANGE UP (ref 1.01–1.03)
UROBILINOGEN FLD QL: 0.2 MG/DL — SIGNIFICANT CHANGE UP (ref 0.2–0.2)
WBC # BLD: 10.62 K/UL — SIGNIFICANT CHANGE UP (ref 4.8–10.8)
WBC # FLD AUTO: 10.62 K/UL — SIGNIFICANT CHANGE UP (ref 4.8–10.8)

## 2019-07-01 PROCEDURE — 74177 CT ABD & PELVIS W/CONTRAST: CPT | Mod: 26

## 2019-07-01 PROCEDURE — 99285 EMERGENCY DEPT VISIT HI MDM: CPT

## 2019-07-01 RX ORDER — ONDANSETRON 8 MG/1
8 TABLET, FILM COATED ORAL ONCE
Refills: 0 | Status: COMPLETED | OUTPATIENT
Start: 2019-07-01 | End: 2019-07-01

## 2019-07-01 RX ORDER — METOCLOPRAMIDE HCL 10 MG
10 TABLET ORAL ONCE
Refills: 0 | Status: COMPLETED | OUTPATIENT
Start: 2019-07-01 | End: 2019-07-01

## 2019-07-01 RX ORDER — KETOROLAC TROMETHAMINE 30 MG/ML
15 SYRINGE (ML) INJECTION ONCE
Refills: 0 | Status: DISCONTINUED | OUTPATIENT
Start: 2019-07-01 | End: 2019-07-01

## 2019-07-01 RX ORDER — SODIUM CHLORIDE 9 MG/ML
1000 INJECTION, SOLUTION INTRAVENOUS ONCE
Refills: 0 | Status: COMPLETED | OUTPATIENT
Start: 2019-07-01 | End: 2019-07-01

## 2019-07-01 RX ORDER — MORPHINE SULFATE 50 MG/1
8 CAPSULE, EXTENDED RELEASE ORAL ONCE
Refills: 0 | Status: DISCONTINUED | OUTPATIENT
Start: 2019-07-01 | End: 2019-07-01

## 2019-07-01 RX ADMIN — Medication 10 MILLIGRAM(S): at 22:46

## 2019-07-01 RX ADMIN — SODIUM CHLORIDE 1000 MILLILITER(S): 9 INJECTION, SOLUTION INTRAVENOUS at 21:48

## 2019-07-01 RX ADMIN — SODIUM CHLORIDE 1000 MILLILITER(S): 9 INJECTION, SOLUTION INTRAVENOUS at 20:48

## 2019-07-01 RX ADMIN — Medication 15 MILLIGRAM(S): at 21:19

## 2019-07-01 RX ADMIN — MORPHINE SULFATE 8 MILLIGRAM(S): 50 CAPSULE, EXTENDED RELEASE ORAL at 23:00

## 2019-07-01 RX ADMIN — MORPHINE SULFATE 8 MILLIGRAM(S): 50 CAPSULE, EXTENDED RELEASE ORAL at 22:47

## 2019-07-01 RX ADMIN — Medication 15 MILLIGRAM(S): at 20:49

## 2019-07-01 RX ADMIN — ONDANSETRON 8 MILLIGRAM(S): 8 TABLET, FILM COATED ORAL at 20:48

## 2019-07-01 NOTE — ED ADULT NURSE NOTE - OBJECTIVE STATEMENT
Patient presents today with complaints of right flank pain, alert and oriented x 4, respirations are even and unlabored, S1, S2 regular, abdomen is soft , right lower quadrant tender to palpation, 18 gauge saline lock placed on left AC, blood drawn and sent. will follow up.

## 2019-07-01 NOTE — ED ADULT TRIAGE NOTE - CHIEF COMPLAINT QUOTE
pt biba for right flank pain that started a few hours ago, radiates to rlq abd, no hx of kidney stones, pt took percocet pta, takes for chronic back pain, pt has n/v

## 2019-07-01 NOTE — ED ADULT NURSE NOTE - CHPI ED NUR SYMPTOMS NEG
no fever/no abdominal distension/no dysuria/no burning urination/no hematuria/no chills/no blood in stool/no diarrhea

## 2019-07-01 NOTE — ED PROVIDER NOTE - NSFOLLOWUPCLINICS_GEN_ALL_ED_FT
[facility]  Urology  .  [place]   Phone: [phone]  Fax:   Follow Up Time:

## 2019-07-01 NOTE — ED PROVIDER NOTE - PHYSICAL EXAMINATION
Vital Signs: I have reviewed the initial vital signs.  Constitutional: well-nourished, appears stated age, (+) severe painful and emotional distress  HEENT: Airway patent, moist MM, no erythema/swelling/deformity of oral structures. EOMI, PERRLA.  CV: regular rate, regular rhythm, well-perfused extremities, 2+ b/l DP and radial pulses equal.  Lungs: BCTA, no increased WOB.  ABD: NTND, no guarding or rebound, no pulsatile mass, no hernias. (+) right CVA tenderness  MSK: Neck supple, nontender, nl ROM, no stepoff. Chest nontender. Back nontender in TLS spine or to b/l bony structures or flanks. Ext nontender, nl rom, no deformity.   INTEG: Skin warm, dry, no rash.  NEURO: A&Ox3, normal strength, nl sensation throughout, normal speech.   PSYCH: Calm, cooperative, normal affect and interaction.

## 2019-07-01 NOTE — ED PROVIDER NOTE - PROGRESS NOTE DETAILS
Patient seen and evaluated by me. Labs/imaging ordered. Started on 1L IVF, given 15mg toradol and 8mg zofran IV. Will re-eval. Pain/nausea controlled at this time. Labs/imaging pending. Pain and nausea recurred. Ordered reglan and morphine. CT and labs pending. UA negative for infection. Pain controlled at this time. Will discharge. Noted patient on meloxicam - states she will not take the meloxicam while on ketorolac PRN pain for nephrolithiasis.

## 2019-07-01 NOTE — ED PROVIDER NOTE - NS ED ROS FT
Constitutional: (-) fever  Eyes/ENT: (-) blurry vision, (-) epistaxis  Cardiovascular: (-) chest pain, (-) syncope  Respiratory: (-) cough, (-) shortness of breath  Gastrointestinal: (+) vomiting, (-) diarrhea  Musculoskeletal: (-) neck pain, (+) back pain, (-) joint pain  Integumentary: (-) rash, (-) edema  Neurological: (-) headache, (-) altered mental status  Psychiatric: (-) hallucinations  Allergic/Immunologic: (-) pruritus

## 2019-07-01 NOTE — ED ADULT NURSE NOTE - NS ED PATIENT SAFETY CONCERN
Alert and oriented x 4. Pt received to intake spot 5 due to dizziness. Pt states : " I had this dizziness on and  off for a month but this week it has gotten worse. I am also nauseous on and off too." Pt denies chest pain, shortness of breath, nausea, vomiting or dizziness. IV 20g to left AC. Labs drawn. VSS. Will continue to monitor.
No

## 2019-07-01 NOTE — ED PROVIDER NOTE - CLINICAL SUMMARY MEDICAL DECISION MAKING FREE TEXT BOX
Patient presented with right flank pain radiating to the groin associated with nausea and vomiting. Otherwise afebrile, HD stable. Obtained labs which were grossly unremarkable. UA negative for infection. CT abd/pelvis showed small nephrolithiasis which will likely spontaneously pass. Patient given toradol followed by morphine, IVF, zofran and reglan for symptomatic control with relief of symptoms. After work up and treatment patient ambulatory, tolerates PO. Agrees to follow up with urology as given in dc instructions. Agrees to return to ED for any new or worsening symptoms.

## 2019-07-01 NOTE — ED PROVIDER NOTE - NSFOLLOWUPINSTRUCTIONS_ED_ALL_ED_FT
Kidney Stones    Kidney stones (urolithiasis) are deposits that form inside your kidneys. The intense pain is caused by the stone moving through the urinary tract. When the stone moves, the ureter goes into spasm around the stone. The stone is usually passed in the urine. Symptoms include abdominal, side, or back pain, nausea, vomiting, blood in the urine, frequency with urination. Drink enough water and fluids to keep your urine clear or pale yellow. This will help you to pass the stone or stone fragments.    SEEK IMMEDIATE MEDICAL CARE IF YOU HAVE THE FOLLOWING SYMPTOMS: pain not controlled with medication, fever/chills, worsening vomiting, inability to urinate, or dizziness/lightheadedness.

## 2019-07-01 NOTE — ED PROVIDER NOTE - OBJECTIVE STATEMENT
54 year old female, pmhx asthma, carpal tunnel syndrome, presenting with a 1 day history of severe right flank pain radiating to her groin described as sharp, 10/10, constant, no palliative or provocative factors. Denies having this before and denies personal or familial history of nephrolithiasis. Also endorses nausea with several episodes of NBNB vomiting associated with the pain. Otherwise denies fevers, headache, vision changes, weakness/numbness, confusion, URI symptoms, neck pain, chest pain, dyspnea, cough, palpitations, abdominal pain, diarrhea, constipation, blood in stool/dark stools, urinary symptoms, vaginal bleeding/discharge, leg swelling, rash, recent travel or sick contacts.

## 2019-07-02 LAB
BACTERIA # UR AUTO: ABNORMAL
COMMENT - URINE: SIGNIFICANT CHANGE UP
EPI CELLS # UR: ABNORMAL /HPF
RBC CASTS # UR COMP ASSIST: ABNORMAL /HPF
WBC UR QL: SIGNIFICANT CHANGE UP /HPF

## 2019-07-02 RX ORDER — KETOROLAC TROMETHAMINE 30 MG/ML
1 SYRINGE (ML) INJECTION
Qty: 20 | Refills: 0
Start: 2019-07-02 | End: 2019-07-06

## 2019-07-02 RX ORDER — TAMSULOSIN HYDROCHLORIDE 0.4 MG/1
1 CAPSULE ORAL
Qty: 7 | Refills: 0
Start: 2019-07-02 | End: 2019-07-08

## 2020-01-22 ENCOUNTER — APPOINTMENT (OUTPATIENT)
Dept: ORTHOPEDIC SURGERY | Facility: CLINIC | Age: 55
End: 2020-01-22
Payer: MEDICARE

## 2020-01-22 DIAGNOSIS — Z87.09 PERSONAL HISTORY OF OTHER DISEASES OF THE RESPIRATORY SYSTEM: ICD-10-CM

## 2020-01-22 DIAGNOSIS — M25.562 PAIN IN LEFT KNEE: ICD-10-CM

## 2020-01-22 DIAGNOSIS — Z98.890 OTHER SPECIFIED POSTPROCEDURAL STATES: ICD-10-CM

## 2020-01-22 DIAGNOSIS — M25.561 PAIN IN RIGHT KNEE: ICD-10-CM

## 2020-01-22 DIAGNOSIS — M17.10 UNILATERAL PRIMARY OSTEOARTHRITIS, UNSPECIFIED KNEE: ICD-10-CM

## 2020-01-22 PROCEDURE — 99204 OFFICE O/P NEW MOD 45 MIN: CPT

## 2020-01-22 PROCEDURE — 73564 X-RAY EXAM KNEE 4 OR MORE: CPT | Mod: 50

## 2020-01-22 NOTE — HISTORY OF PRESENT ILLNESS
[Pain Location] : pain [8] : a maximum pain level of 8/10 [Worsening] : worsening [Walking] : walking [Standing] : standing [Constant] : ~He/She~ states the symptoms seem to be constant [de-identified] : 54 year old female presents to the office today complaining of bilateral knee pain, right knee pain is worse than left knee pain. Pt reports pain that is achy and throbbing in nature. Pt reports swelling, locking, and clicking. Pt reports pain with ambulating about 5 blocks. She reports pain and difficulty with negotiating stairs, and states pain is worse with ascending. Pt reports pain with standing from a seated position. Pt takes Percocet for pain but this is mostly for her chronic back pain and given by her pain management doctor. Pt is here today to discuss her options for pain relief.

## 2020-01-22 NOTE — PHYSICAL EXAM
[de-identified] : Radiographs done today AP lateral and skyline of both knees shows well maintained joint spaces  [de-identified] : General appearance: well nourished and hydrated, pleasant, alert and oriented x 3, cooperative.\par Cardiovascular: no apparent abnormalities, no lower leg edema, no varicosities, pedal pulses are palpable.\par Neurologic: sensation is normal, no muscle weakness in upper or lower extremities\par Dermatologic no apparent skin lesions, moist, warm, no rash.\par Gait: nonantalgic.\par \par Left knee\par Inspection: no effusion or erythema.\par Wounds: none.\par Alignment: normal.\par Palpation: no specific tenderness on palpation.\par ROM: 0-120 degrees with PF crepitus\par Ligamentous laxity: all ligaments appear stable\par Muscle Test: good quad strength.\par \par Right knee\par Inspection: no effusion or erythema.\par Wounds: none.\par Alignment: normal.\par Palpation: no specific tenderness on palpation.\par ROM:0-120 degrees with PF crepitus\par Ligamentous laxity: all ligaments appear stable\par Muscle Test: good quad strength.\par \par Left hip\par Inspection: No swelling or ecchymosis.\par Wounds: none.\par Palpation: non-tender.\par Stability: no instability.\par Strength: 5/5 all motor groups.\par ROM: no pain with FROM.\par Leg length: equal.\par \par Right hip\par Inspection: No swelling or ecchymosis.\par Wounds: none.\par Palpation: non-tender.\par Stability: no instability.\par Strength: 5/5 all motor groups.\par ROM: no pain with FROM.\par Leg length: equal.\par

## 2020-01-22 NOTE — ASSESSMENT
[FreeTextEntry1] : Pt presents with bilateral knee pain, right worse than left. It is activity related. It is the anterior portions of the knees and primarily associated with stairs, prolonged sitting, getting in and out of chairs. There has been no Tx up to this point. The plan is to start her with a PT regimen along with at home exercises. She was told this can take 6-12 months to resolve and requires compliance with her doing the exercises. F/u PRN

## 2020-08-31 NOTE — ED ADULT TRIAGE NOTE - BP NONINVASIVE SYSTOLIC (MM HG)
Department of Psychiatry  Attending Progress Note  Chief Complaint: Major depressive disorder, recurrent ([address]     SUBJECTIVE:    Patient is a 51-year-old male. He was admitted because of his increasing depression and suicidal thoughts. He is experiencing auditory hallucinations. He complains of lack energy and motivation. He has pressured mood and affect. He has homicidal thoughts towards a lot of people. He did not explain why he is angry and is having homicidal thoughts so many people. He did not mention anybody in particular. He has no insight. Judgment is impaired.   OBJECTIVE    Physical  /89   Pulse 82   Temp 98.1 °F (36.7 °C) (Oral)   Resp 14   Ht 5' 10\" (1.778 m)   Wt 250 lb (113.4 kg)   BMI 35.87 kg/m²      Mental Status Evaluation:  Orientation: alertness: alert   Mood:. anxious      Affect:  constricted      Appearance:  age appropriate   Activity:  Psychomotor Agitation   Gait/Posture: Normal   Speech:  delayed and increased latency of response   Thought Process:  circumstantial   Thought Content:  delusions and hallucinations   Sensorium:  person and place   Cognition:  grossly intact   Memory: intact   Insight:  limited   Judgment: limited   Suicidal Ideations: passive and without plan   Homicidal Ideations: Positive for homicidal ideation      Medication Side Effects: absent       Attention Span attention span appeared shorter than expected for age     Medications  Current Facility-Administered Medications   Medication Dose Route Frequency Provider Last Rate Last Dose    divalproex (DEPAKOTE ER) extended release tablet 500 mg  500 mg Oral BID Deidra Franco MD   500 mg at 08/30/20 2041    OLANZapine (ZYPREXA) tablet 10 mg  10 mg Oral Nightly Carrie Olivo MD   10 mg at 08/30/20 2040    acetaminophen (TYLENOL) tablet 650 mg  650 mg Oral Q4H PRN Deidra Franco MD        polyethylene glycol (GLYCOLAX) packet 17 g  17 g Oral Daily PRN Deidra Franco MD        traZODone (DESYREL) tablet 50 mg  50 mg Oral Nightly PRN Deidra Franco MD   50 mg at 08/30/20 2040         divalproex  500 mg Oral BID    OLANZapine  10 mg Oral Nightly       ASSESSMENT  Major depressive disorder, recurrent (HCC)     Patient's Response to Treatment: positive    PLAN  Dragon voice recognition software used in portions of this document. He is currently on Zyprexa and Depakote. We will continue to monitor his progress. Adjust medication as needed depending on the response. 103

## 2020-09-15 ENCOUNTER — INPATIENT (INPATIENT)
Facility: HOSPITAL | Age: 55
LOS: 2 days | Discharge: HOME | End: 2020-09-18
Attending: HOSPITALIST | Admitting: HOSPITALIST
Payer: MEDICARE

## 2020-09-15 VITALS
HEART RATE: 102 BPM | SYSTOLIC BLOOD PRESSURE: 107 MMHG | TEMPERATURE: 99 F | HEIGHT: 61 IN | DIASTOLIC BLOOD PRESSURE: 70 MMHG | WEIGHT: 128.09 LBS | RESPIRATION RATE: 18 BRPM | OXYGEN SATURATION: 99 %

## 2020-09-15 DIAGNOSIS — Z98.890 OTHER SPECIFIED POSTPROCEDURAL STATES: Chronic | ICD-10-CM

## 2020-09-15 LAB
ALBUMIN SERPL ELPH-MCNC: 4.7 G/DL — SIGNIFICANT CHANGE UP (ref 3.5–5.2)
ALP SERPL-CCNC: 68 U/L — SIGNIFICANT CHANGE UP (ref 30–115)
ALT FLD-CCNC: 15 U/L — SIGNIFICANT CHANGE UP (ref 0–41)
ANION GAP SERPL CALC-SCNC: 7 MMOL/L — SIGNIFICANT CHANGE UP (ref 7–14)
APPEARANCE UR: CLEAR — SIGNIFICANT CHANGE UP
AST SERPL-CCNC: 22 U/L — SIGNIFICANT CHANGE UP (ref 0–41)
BASOPHILS # BLD AUTO: 0.04 K/UL — SIGNIFICANT CHANGE UP (ref 0–0.2)
BASOPHILS NFR BLD AUTO: 0.8 % — SIGNIFICANT CHANGE UP (ref 0–1)
BILIRUB SERPL-MCNC: 0.3 MG/DL — SIGNIFICANT CHANGE UP (ref 0.2–1.2)
BILIRUB UR-MCNC: NEGATIVE — SIGNIFICANT CHANGE UP
BUN SERPL-MCNC: 13 MG/DL — SIGNIFICANT CHANGE UP (ref 10–20)
CALCIUM SERPL-MCNC: 10.1 MG/DL — SIGNIFICANT CHANGE UP (ref 8.5–10.1)
CHLORIDE SERPL-SCNC: 104 MMOL/L — SIGNIFICANT CHANGE UP (ref 98–110)
CO2 SERPL-SCNC: 29 MMOL/L — SIGNIFICANT CHANGE UP (ref 17–32)
COLOR SPEC: COLORLESS — SIGNIFICANT CHANGE UP
CREAT SERPL-MCNC: 0.8 MG/DL — SIGNIFICANT CHANGE UP (ref 0.7–1.5)
D DIMER BLD IA.RAPID-MCNC: 139 NG/ML DDU — SIGNIFICANT CHANGE UP (ref 0–230)
DIFF PNL FLD: NEGATIVE — SIGNIFICANT CHANGE UP
EOSINOPHIL # BLD AUTO: 0.11 K/UL — SIGNIFICANT CHANGE UP (ref 0–0.7)
EOSINOPHIL NFR BLD AUTO: 2.3 % — SIGNIFICANT CHANGE UP (ref 0–8)
GLUCOSE SERPL-MCNC: 108 MG/DL — HIGH (ref 70–99)
GLUCOSE UR QL: NEGATIVE — SIGNIFICANT CHANGE UP
HCT VFR BLD CALC: 43.1 % — SIGNIFICANT CHANGE UP (ref 37–47)
HGB BLD-MCNC: 14.2 G/DL — SIGNIFICANT CHANGE UP (ref 12–16)
IMM GRANULOCYTES NFR BLD AUTO: 0.4 % — HIGH (ref 0.1–0.3)
KETONES UR-MCNC: NEGATIVE — SIGNIFICANT CHANGE UP
LEUKOCYTE ESTERASE UR-ACNC: NEGATIVE — SIGNIFICANT CHANGE UP
LIDOCAIN IGE QN: 19 U/L — SIGNIFICANT CHANGE UP (ref 7–60)
LYMPHOCYTES # BLD AUTO: 1.4 K/UL — SIGNIFICANT CHANGE UP (ref 1.2–3.4)
LYMPHOCYTES # BLD AUTO: 29.4 % — SIGNIFICANT CHANGE UP (ref 20.5–51.1)
MCHC RBC-ENTMCNC: 29.5 PG — SIGNIFICANT CHANGE UP (ref 27–31)
MCHC RBC-ENTMCNC: 32.9 G/DL — SIGNIFICANT CHANGE UP (ref 32–37)
MCV RBC AUTO: 89.6 FL — SIGNIFICANT CHANGE UP (ref 81–99)
MONOCYTES # BLD AUTO: 0.31 K/UL — SIGNIFICANT CHANGE UP (ref 0.1–0.6)
MONOCYTES NFR BLD AUTO: 6.5 % — SIGNIFICANT CHANGE UP (ref 1.7–9.3)
NEUTROPHILS # BLD AUTO: 2.89 K/UL — SIGNIFICANT CHANGE UP (ref 1.4–6.5)
NEUTROPHILS NFR BLD AUTO: 60.6 % — SIGNIFICANT CHANGE UP (ref 42.2–75.2)
NITRITE UR-MCNC: NEGATIVE — SIGNIFICANT CHANGE UP
NRBC # BLD: 0 /100 WBCS — SIGNIFICANT CHANGE UP (ref 0–0)
PH UR: 7 — SIGNIFICANT CHANGE UP (ref 5–8)
PLATELET # BLD AUTO: 245 K/UL — SIGNIFICANT CHANGE UP (ref 130–400)
POTASSIUM SERPL-MCNC: 4.5 MMOL/L — SIGNIFICANT CHANGE UP (ref 3.5–5)
POTASSIUM SERPL-SCNC: 4.5 MMOL/L — SIGNIFICANT CHANGE UP (ref 3.5–5)
PROT SERPL-MCNC: 7.4 G/DL — SIGNIFICANT CHANGE UP (ref 6–8)
PROT UR-MCNC: NEGATIVE — SIGNIFICANT CHANGE UP
RBC # BLD: 4.81 M/UL — SIGNIFICANT CHANGE UP (ref 4.2–5.4)
RBC # FLD: 12.6 % — SIGNIFICANT CHANGE UP (ref 11.5–14.5)
SODIUM SERPL-SCNC: 140 MMOL/L — SIGNIFICANT CHANGE UP (ref 135–146)
SP GR SPEC: 1.01 — LOW (ref 1.01–1.03)
TROPONIN T SERPL-MCNC: <0.01 NG/ML — SIGNIFICANT CHANGE UP
TROPONIN T SERPL-MCNC: <0.01 NG/ML — SIGNIFICANT CHANGE UP
UROBILINOGEN FLD QL: SIGNIFICANT CHANGE UP
WBC # BLD: 4.77 K/UL — LOW (ref 4.8–10.8)
WBC # FLD AUTO: 4.77 K/UL — LOW (ref 4.8–10.8)

## 2020-09-15 PROCEDURE — 71046 X-RAY EXAM CHEST 2 VIEWS: CPT | Mod: 26

## 2020-09-15 PROCEDURE — 93010 ELECTROCARDIOGRAM REPORT: CPT | Mod: 77

## 2020-09-15 PROCEDURE — 93010 ELECTROCARDIOGRAM REPORT: CPT

## 2020-09-15 PROCEDURE — 99218: CPT

## 2020-09-15 RX ORDER — ASPIRIN/CALCIUM CARB/MAGNESIUM 324 MG
325 TABLET ORAL ONCE
Refills: 0 | Status: COMPLETED | OUTPATIENT
Start: 2020-09-15 | End: 2020-09-15

## 2020-09-15 RX ORDER — OXYCODONE AND ACETAMINOPHEN 5; 325 MG/1; MG/1
2 TABLET ORAL ONCE
Refills: 0 | Status: DISCONTINUED | OUTPATIENT
Start: 2020-09-15 | End: 2020-09-15

## 2020-09-15 RX ORDER — KETOROLAC TROMETHAMINE 30 MG/ML
15 SYRINGE (ML) INJECTION ONCE
Refills: 0 | Status: DISCONTINUED | OUTPATIENT
Start: 2020-09-15 | End: 2020-09-15

## 2020-09-15 RX ADMIN — OXYCODONE AND ACETAMINOPHEN 2 TABLET(S): 5; 325 TABLET ORAL at 13:37

## 2020-09-15 RX ADMIN — OXYCODONE AND ACETAMINOPHEN 2 TABLET(S): 5; 325 TABLET ORAL at 14:07

## 2020-09-15 RX ADMIN — Medication 325 MILLIGRAM(S): at 20:09

## 2020-09-15 NOTE — ED ADULT NURSE NOTE - OBJECTIVE STATEMENT
Patient c.o lcw pain radiating to back and intermittent sob for the past two days. Patient states pain comes on at rest and is constant.

## 2020-09-15 NOTE — ED CDU PROVIDER INITIAL DAY NOTE - PHYSICAL EXAMINATION
Gen: Alert, NAD, well appearing  Head: NC, AT, PERRL, EOMI, normal lids/conjunctiva  ENT: normal hearing  Neck: +supple, no tenderness/meningismus,  Pulm: Bilateral BS, normal resp effort, no wheeze/stridor/retractions  CV: S1S2, RRR  Abd: soft, NT/ND  Mskel: no edema/erythema/cyanosis. No calf tenderness or swelling  Skin: no rash, warm/dry  Neuro: AAOx3, no sensory/motor deficits

## 2020-09-15 NOTE — ED PROVIDER NOTE - CLINICAL SUMMARY MEDICAL DECISION MAKING FREE TEXT BOX
54yo F presents for chest pain. Troponin negative. Ddimer negative. CXR clear. EKG nonischemic. Dispo to EDOU for continued chest pain/ ACS workup.

## 2020-09-15 NOTE — ED CDU PROVIDER INITIAL DAY NOTE - OBJECTIVE STATEMENT
56 yo F hx of herniated discs c/o left sided chest pains x 2 days. Pain is sharp, intermittent, moderate in severity, and worse with inspiration and movement. +cough. No f/c/n/v/c/d. No leg pains or leg swelling. +smoker

## 2020-09-15 NOTE — ED PROVIDER NOTE - NS ED ROS FT
General: No fevers, chills, or malaise.  HEENT: No headache, visual changes, eye pain, hearing changes, ear pain, running nose, or sore throat.  Cardiac:  Reports chest pain and SOB. No leg edema or leg pain.  Respiratory:  No cough, respiratory distress, or hemoptysis.  GI:  No nausea, vomiting, diarrhea, or abdominal pain.  :  No dysuria, frequency, or urgency.  MS:  Reports chronic back pain that is at pt's baseline.   Neuro:  No dizziness, LOC, paralysis, or N/T.  Skin:  No skin rash.   Endocrine: No polyuria, polyphagia, or polydipsia.

## 2020-09-15 NOTE — ED PROVIDER NOTE - ATTENDING CONTRIBUTION TO CARE
54yo F with PMHx Tarlov cyst disease, lumbar herniated discs, chronic back pain, +smoker, presents for chest pain x2 days, left sided, sharp, constant, radiating to left scapula, worse when lying flat. Assoc with SOB and feeling "clammy last night." Unclear famhx CAD. No prior cardiac workup. Denies fever, chills, headache, lightheadedness, dizziness, cough, palpitations, nausea, vomiting, diarrhea, abd pain. No leg swelling, hemopytsis, OCP/hormone use, recent prolonged immobilization (including surgery, trauma, bedrest, plane travel), h/o DVT/PE, h/o malignancy.     Vital signs reviewed  GENERAL: Patient nontoxic appearing, NAD  HEAD: NCAT  EYES: Anicteric  ENT: MMM  RESPIRATORY: Normal respiratory effort. CTA B/L. No wheezing, rales, rhonchi  CARDIOVASCULAR: Regular rate and rhythm  ABDOMEN: Soft. Nondistended. Nontender. No guarding or rebound.   MUSCULOSKELETAL/EXTREMITIES: Brisk cap refill. Equal radial pulses. No leg edema or calf tenderness.   SKIN:  Warm and dry  NEURO: AAOx3. No gross FND.

## 2020-09-15 NOTE — ED PROVIDER NOTE - OBJECTIVE STATEMENT
55 y.o. female w/ Tarlov cyst disease, herniated lumbar discs, chronic pain, and smoker presents for chest pain x 2 days.  States that pain is worse with breathing and laying back, better with sitting upright.  Describes as sharp pain that is located in left chest with radiation to left scapula.  Denies having had this pain before.  Reports SOB. Denies fevers, recent illness, N/V, diaphoresis, leg swelling, leg pain, recent immobilization/travel, hemoptysis, OCP use, hx of blood clots.  Reports mother had cardiac problems but unsure if had MI/at what age.  Denies personal hx of cardiac problems.  Denies having a cardiologist, having had a stress test or echo.

## 2020-09-15 NOTE — ED CDU PROVIDER INITIAL DAY NOTE - NS ED ROS FT
Review of Systems    Constitutional: (-) fever, (-) chills  Eyes/ENT: (-) blurry vision, (-) epistaxis, (-) sore throat  Cardiovascular: (+) chest pain, (-) syncope  Respiratory: (+) cough, (-) shortness of breath  Gastrointestinal: (-) pain, (-) nausea, (-) vomiting, (-) diarrhea  Musculoskeletal: (-) neck pain, (-) back pain, (-) body aches  Integumentary: (-) rash, (-) edema  Neurological: (-) headache, (-) altered mental status  Psychiatric: (-) hallucinations  Allergic/Immunologic: (-) pruritus

## 2020-09-15 NOTE — ED ADULT NURSE NOTE - NSIMPLEMENTINTERV_GEN_ALL_ED
Implemented All Universal Safety Interventions:  [place] to call system. Call bell, personal items and telephone within reach. Instruct patient to call for assistance. Room bathroom lighting operational. Non-slip footwear when patient is off stretcher. Physically safe environment: no spills, clutter or unnecessary equipment. Stretcher in lowest position, wheels locked, appropriate side rails in place.

## 2020-09-15 NOTE — ED CDU PROVIDER INITIAL DAY NOTE - MEDICAL DECISION MAKING DETAILS
56yo F presents for chest pain. Troponin negative. Ddimer negative. CXR clear. EKG nonischemic. Dispo to EDOU for continued chest pain/ ACS workup.

## 2020-09-15 NOTE — ED PROVIDER NOTE - PROGRESS NOTE DETAILS
MR--R/o PE, PTX, ACS.  Possibly MSK origin. Less likely to be pericarditis due to ECG. Unlikely esophageal perforation, dissection. MR--R/o PE, PTX, ACS.  Less likely to be pericarditis due to ECG. Unlikely esophageal perforation, dissection.

## 2020-09-16 LAB — GLUCOSE BLDC GLUCOMTR-MCNC: 102 MG/DL — HIGH (ref 70–99)

## 2020-09-16 PROCEDURE — 99222 1ST HOSP IP/OBS MODERATE 55: CPT | Mod: 57

## 2020-09-16 PROCEDURE — 99217: CPT

## 2020-09-16 PROCEDURE — 92928 PRQ TCAT PLMT NTRAC ST 1 LES: CPT | Mod: LD

## 2020-09-16 PROCEDURE — 93571 IV DOP VEL&/PRESS C FLO 1ST: CPT | Mod: 26,LD

## 2020-09-16 PROCEDURE — 93458 L HRT ARTERY/VENTRICLE ANGIO: CPT | Mod: 26,XU

## 2020-09-16 PROCEDURE — 75574 CT ANGIO HRT W/3D IMAGE: CPT | Mod: 26

## 2020-09-16 RX ORDER — OXYCODONE AND ACETAMINOPHEN 5; 325 MG/1; MG/1
1 TABLET ORAL EVERY 6 HOURS
Refills: 0 | Status: DISCONTINUED | OUTPATIENT
Start: 2020-09-16 | End: 2020-09-17

## 2020-09-16 RX ORDER — METOPROLOL TARTRATE 50 MG
100 TABLET ORAL ONCE
Refills: 0 | Status: COMPLETED | OUTPATIENT
Start: 2020-09-16 | End: 2020-09-16

## 2020-09-16 RX ORDER — ATORVASTATIN CALCIUM 80 MG/1
80 TABLET, FILM COATED ORAL AT BEDTIME
Refills: 0 | Status: DISCONTINUED | OUTPATIENT
Start: 2020-09-16 | End: 2020-09-18

## 2020-09-16 RX ORDER — OXYCODONE AND ACETAMINOPHEN 5; 325 MG/1; MG/1
1 TABLET ORAL EVERY 6 HOURS
Refills: 0 | Status: ACTIVE | OUTPATIENT
Start: 2020-09-16 | End: 2020-09-23

## 2020-09-16 RX ORDER — CLOPIDOGREL BISULFATE 75 MG/1
75 TABLET, FILM COATED ORAL DAILY
Refills: 0 | Status: DISCONTINUED | OUTPATIENT
Start: 2020-09-17 | End: 2020-09-18

## 2020-09-16 RX ORDER — OXYCODONE AND ACETAMINOPHEN 5; 325 MG/1; MG/1
2 TABLET ORAL ONCE
Refills: 0 | Status: DISCONTINUED | OUTPATIENT
Start: 2020-09-16 | End: 2020-09-16

## 2020-09-16 RX ORDER — INFLUENZA VIRUS VACCINE 15; 15; 15; 15 UG/.5ML; UG/.5ML; UG/.5ML; UG/.5ML
0.5 SUSPENSION INTRAMUSCULAR ONCE
Refills: 0 | Status: DISCONTINUED | OUTPATIENT
Start: 2020-09-16 | End: 2020-09-18

## 2020-09-16 RX ORDER — SODIUM CHLORIDE 9 MG/ML
1000 INJECTION INTRAMUSCULAR; INTRAVENOUS; SUBCUTANEOUS
Refills: 0 | Status: DISCONTINUED | OUTPATIENT
Start: 2020-09-16 | End: 2020-09-18

## 2020-09-16 RX ORDER — ACETAMINOPHEN 500 MG
650 TABLET ORAL EVERY 6 HOURS
Refills: 0 | Status: DISCONTINUED | OUTPATIENT
Start: 2020-09-16 | End: 2020-09-16

## 2020-09-16 RX ORDER — SODIUM CHLORIDE 9 MG/ML
1000 INJECTION INTRAMUSCULAR; INTRAVENOUS; SUBCUTANEOUS ONCE
Refills: 0 | Status: COMPLETED | OUTPATIENT
Start: 2020-09-16 | End: 2020-09-16

## 2020-09-16 RX ORDER — PANTOPRAZOLE SODIUM 20 MG/1
40 TABLET, DELAYED RELEASE ORAL
Refills: 0 | Status: DISCONTINUED | OUTPATIENT
Start: 2020-09-16 | End: 2020-09-18

## 2020-09-16 RX ORDER — ASPIRIN/CALCIUM CARB/MAGNESIUM 324 MG
81 TABLET ORAL DAILY
Refills: 0 | Status: DISCONTINUED | OUTPATIENT
Start: 2020-09-16 | End: 2020-09-18

## 2020-09-16 RX ORDER — ALBUTEROL 90 UG/1
2 AEROSOL, METERED ORAL EVERY 6 HOURS
Refills: 0 | Status: DISCONTINUED | OUTPATIENT
Start: 2020-09-16 | End: 2020-09-18

## 2020-09-16 RX ADMIN — SODIUM CHLORIDE 1000 MILLILITER(S): 9 INJECTION INTRAMUSCULAR; INTRAVENOUS; SUBCUTANEOUS at 06:48

## 2020-09-16 RX ADMIN — OXYCODONE AND ACETAMINOPHEN 2 TABLET(S): 5; 325 TABLET ORAL at 00:05

## 2020-09-16 RX ADMIN — OXYCODONE AND ACETAMINOPHEN 1 TABLET(S): 5; 325 TABLET ORAL at 09:21

## 2020-09-16 RX ADMIN — SODIUM CHLORIDE 100 MILLILITER(S): 9 INJECTION INTRAMUSCULAR; INTRAVENOUS; SUBCUTANEOUS at 22:24

## 2020-09-16 RX ADMIN — Medication 100 MILLIGRAM(S): at 09:21

## 2020-09-16 RX ADMIN — OXYCODONE AND ACETAMINOPHEN 2 TABLET(S): 5; 325 TABLET ORAL at 22:04

## 2020-09-16 RX ADMIN — OXYCODONE AND ACETAMINOPHEN 2 TABLET(S): 5; 325 TABLET ORAL at 22:50

## 2020-09-16 RX ADMIN — OXYCODONE AND ACETAMINOPHEN 2 TABLET(S): 5; 325 TABLET ORAL at 00:35

## 2020-09-16 RX ADMIN — ATORVASTATIN CALCIUM 80 MILLIGRAM(S): 80 TABLET, FILM COATED ORAL at 22:19

## 2020-09-16 NOTE — H&P ADULT - HISTORY OF PRESENT ILLNESS
55 year old female patient presented for chest pain.    Known with a history of asthma, smoker (not daily).  Family history of premature CAD in mother (~age 59)      Current history goes back to 2 days prp when the patient experienced acute onset retrosternal / left chest pain that woke her up from sleep, radiating toward L scapular/back, constant since then without resolution. Worse with deep breaths and leaning forward,  not reproducible on palpation.      Pt states she exercises frequently,  no symptoms with exercise.    No recent fevers / illnesses / gi symptoms, but states she started having a cough while in ED today.   Admitted to the ER, troponins -ve, CCTA showing noncalcified mod-sev lesion in prox LAD.

## 2020-09-16 NOTE — H&P ADULT - NSHPLABSRESULTS_GEN_ALL_CORE
14.2   4.77  )-----------( 245      ( 15 Sep 2020 12:00 )             43.1     09-15    140  |  104  |  13  ----------------------------<  108<H>  4.5   |  29  |  0.8    Ca    10.1      15 Sep 2020 12:00    TPro  7.4  /  Alb  4.7  /  TBili  0.3  /  DBili  x   /  AST  22  /  ALT  15  /  AlkPhos  68  09-15    CT Heart with Coronaries (09.16.20 @ 10:44) >    Noncalcified plaque within the proximal LAD contributing to moderate to severe stenosis.  Atherosclerotic disease elsewhere without significant stenosis  The total Agatston coronary artery calcium score equals 38, which corresponds to 93rd percentile for age, gender and ethnicity.

## 2020-09-16 NOTE — H&P ADULT - ASSESSMENT
55 year old female patient presented for chest pain.    # Chest pain  - Coronary angiography done showed    55 year old female patient presented for chest pain.    # Chest pain  - Coronary angiography done showed mid LAD 70-80% stenosis, iFR showed lesion hemodynamically significant    > PRAVEEN synergy 3.5x16 deployed   - Aspirin, plavix  - Atrovastatin 80mg qd  - Follow lipid panel in the morning   - Follow echo     # Asthma  - Albuterol PRN    # Diet > DASH/TLC  # DVT prophylaxis > Lovenox 40mg qd when ok by cardiology

## 2020-09-16 NOTE — H&P ADULT - NSHPPHYSICALEXAM_GEN_ALL_CORE
General: A/ox 3, No acute Distress, anxious  Neck: Supple, NO JVD  Cardiac: S1 S2 heard regular, No audible murmurs  Pulmonary: Good bilateral breath sounds, Breathing unlabored, No Rhonchi/Rales/Wheezing  Abdomen: Soft, Non -tender, +BS   Extremities: No Rashes, No edema  Neuro: A/o x 3, No focal deficits  Psch: normal mood , normal affect    T(C): 36.8 (09-16-20 @ 16:05), Max: 36.8 (09-16-20 @ 16:05)  HR: 64 (09-16-20 @ 16:05) (64 - 75)  BP: 107/64 (09-16-20 @ 16:05) (91/50 - 116/70)  RR: 18 (09-16-20 @ 16:05) (16 - 20)  SpO2: 99% (09-16-20 @ 16:05) (97% - 99%) General: A/ox 3, No acute Distress, anxious  Neck: Supple, NO JVD  Cardiac: S1 S2 heard regular, No audible murmurs  Pulmonary: Good bilateral breath sounds, Breathing unlabored, No Rhonchi/Rales/Wheezing  Abdomen/GI: Soft, Non -tender, +BS   Extremities: No Rashes, No edema  Neuro: A/o x 3, No focal deficits  Psch: normal mood , normal affect    T(C): 36.8 (09-16-20 @ 16:05), Max: 36.8 (09-16-20 @ 16:05)  HR: 64 (09-16-20 @ 16:05) (64 - 75)  BP: 107/64 (09-16-20 @ 16:05) (91/50 - 116/70)  RR: 18 (09-16-20 @ 16:05) (16 - 20)  SpO2: 99% (09-16-20 @ 16:05) (97% - 99%)

## 2020-09-16 NOTE — CONSULT NOTE ADULT - ASSESSMENT
IMPRESSION  - Atypical CP  - CAD / Abnormal CCTA (noncalcified lesion prox lad with mod-sev stenosis)      PLAN  - doubt acs  - rapid covid sent per ed  - check lipid panel, a1c  - check tte  - start on aspirin 81, lipitor 80  - NPO for cath today as add on IMPRESSION  - Atypical CP  - CAD / Abnormal CCTA (noncalcified lesion prox lad with mod-sev stenosis)      PLAN  - doubt acs  - rapid covid sent per ed  - check lipid panel, a1c  - check tte  - start on aspirin 81, lipitor 80  - NPO for cath today to define the extend of cad

## 2020-09-16 NOTE — ED CDU PROVIDER SUBSEQUENT DAY NOTE - PROGRESS NOTE DETAILS
pt. in no distress, pt. remains on cardiac monitor. trops negative x2. will continue to reassess. plan for ccta vs nuclear stress test in the morning. pt seen bedside, NAD, pt states she is in pain will give her home meds for pain, still complaining of CP. Negative cardiac enzymes x2 and nl ekg. pt scheduled to go for CCTA.  Will continue to monitor patients hear rate and take her for the test.

## 2020-09-16 NOTE — CONSULT NOTE ADULT - SUBJECTIVE AND OBJECTIVE BOX
HPI:  56 yo F,  pmh for recurring spinal cyst, asthma, smoker (not daily), Fh premature cad in mother (~age 59) --   pw sudden onset L upper chest pain 2d ago, woke pt up from sleep, radiating toward L scapular/back, constant since then without resolution. Worse with deep breaths and leaning forward,  not reproducible on palpation.      Pt states she exercises frequently,  no symptoms with exercise.    No recent fevers / illnesses / gi symptoms, but states she started having a cough while in ED today.     Trops neg x 2  CCTA showing noncalcified mod-sev lesion in prox LAD.     PAST MEDICAL & SURGICAL HISTORY  Asthma  no flare yrs no meds    Carpal tunnel syndrome of left wrist  s/p rt ctr     S/P lumbar discectomy    H/O arthroscopy of shoulder  rt 2017        FAMILY HISTORY:  CAD mother (age 59)        SOCIAL HISTORY:  [x]smoker on/off some day smoker since age 16  []Alcohol  [- ]Drug     ALLERGIES:  No Known Allergies      MEDICATIONS:  oxycodone    5 mG/acetaminophen 325 mG 1 Tablet(s) Oral every 6 hours PRN  oxycodone    5 mG/acetaminophen 325 mG 1 Tablet(s) Oral every 6 hours PRN      HOME MEDICATIONS:  Home Medications:  Lyrica 50 mg oral capsule: 1 cap(s) orally 2 times a day (06 May 2019 08:38)  meloxicam 7.5 mg oral tablet: 1 tab(s) orally once a day (06 May 2019 08:38)  Percocet 10/325 oral tablet: 1 tab(s) orally 2 times a day (06 May 2019 08:38)      VITALS:   T(F): 97.9 ( @ 07:42), Max: 98.8 (09-15 @ 11:20)  HR: 70 ( @ 07:42) (69 - 102)  BP: 102/57 ( @ 07:42) (91/50 - 116/70)  BP(mean): --  RR: 20 ( @ 07:42) (16 - 20)  SpO2: 97% ( @ 07:42) (97% - 99%)    I&O's Summary      REVIEW OF SYSTEMS:  CONSTITUTIONAL: No weakness, fevers or chills  HEENT: No visual changes, neck/ear pain  RESPIRATORY: +cough today, sob  CARDIOVASCULAR: +CP, pleuritic  GASTROINTESTINAL: No abdominal pain. No nausea, vomiting, diarrhea   GENITOURINARY: No dysuria, frequency or hematuria  NEUROLOGICAL: No new focal deficits  SKIN: No new rashes    PHYSICAL EXAM:  General: Not in distress.  Non-toxic appearing.   HEENT: EOMI  Cardio: Regular rate and rhythm, S1, S2, no murmur  Pulm: B/L BS.  No wheezing / crackles  Abdomen: Soft, non-tender, non-distended. Normoactive bowel sounds  Extremities: No edema b/l   Neuro: A&O x3. No focal deficits    LABS:                        14.2   4.77  )-----------( 245      ( 15 Sep 2020 12:00 )             43.1     09-15    140  |  104  |  13  ----------------------------<  108<H>  4.5   |  29  |  0.8    Ca    10.1      15 Sep 2020 12:00    TPro  7.4  /  Alb  4.7  /  TBili  0.3  /  DBili  x   /  AST  22  /  ALT  15  /  AlkPhos  68  -15      Troponin T, Serum: <0.01 ng/mL (09-15-20 @ 17:38)    CARDIAC MARKERS ( 15 Sep 2020 17:38 )  x     / <0.01 ng/mL / x     / x     / x      CARDIAC MARKERS ( 15 Sep 2020 12:00 )  x     / <0.01 ng/mL / x     / x     / x            Troponin trend:            RADIOLOGY:  -CXR:< from: CT Heart with Coronaries (20 @ 10:44) >  INTERPRETATION:    Calcium Score:    Vessel              Calcium Score    =======================================================  LM:                 0  LAD:                 37  LCX:                 0  RCA:                 1  =======================================================  Total:                38      CORONARY CT ANGIOGRAM:    There is a right dominant coronary arterial system.    Left Main Artery: Patent with no evidence of plaque or stenosis.    Left Anterior Descending Artery: Noncalcified plaque within the proximal segment contributing to moderate to severe stenosis. Calcified plaque elsewhere without significantstenosis.    Left Circumflex Artery: Patent with no evidence of plaque or stenosis.    Right Coronary Artery: Minimal calcified plaque at the ostium without significant stenosis.    CARDIAC MORPHOLOGY: The cardiac chambers are normal in size.  There is no pericardial effusion.    IMAGED AORTA: The thoracic aorta is normal in caliber.    IMAGED EXTRACARDIAC FINDINGS:  Right lower lobe atelectasis.      IMPRESSION:    Noncalcified plaque within the proximal LAD contributing to moderate to severe stenosis.    Atherosclerotic disease elsewhere without significant stenosis    The total Agatston coronary artery calcium score equals 38, which corresponds to 93rd percentile for age, gender and ethnicity.    CAD-RADS 3/4.    < end of copied text >  < from: Xray Chest 2 Views PA/Lat (09.15.20 @ 14:16) >  No radiographic evidence of acute cardiopulmonary disease.    < end of copied text >    -TTE:  -CCTA:  -STRESS TEST:  -CATHETERIZATION:    EC Lead ECG:   Ventricular Rate 66 BPM    Atrial Rate 66 BPM    P-R Interval 126 ms    QRS Duration 80 ms    Q-T Interval 418 ms    QTC Calculation(Bazett) 438 ms    P Axis 71 degrees    R Axis 80 degrees    T Axis 75 degrees    Diagnosis Line Normal sinus rhythm  Normal ECG    Confirmed by Yosvany King ([id number]) on 9/15/2020 7:51:58 PM (09-15 @ 16:34)      TELEMETRY EVENTS:

## 2020-09-16 NOTE — ED CDU PROVIDER SUBSEQUENT DAY NOTE - ATTENDING CONTRIBUTION TO CARE
I personally evaluated the patient. I reviewed the Resident’s or Physician Assistant’s note (as assigned above), and agree with the findings and plan except as documented in my note.    55 female here for chest pain had ED management including labs imaging EKG monitoring and supportive care with plan for EDOU care for low risk chest pain protocol. No acute clinical issues.

## 2020-09-16 NOTE — ED ADULT NURSE REASSESSMENT NOTE - NS ED NURSE REASSESS COMMENT FT1
Pt. assessed. Pt. rested well throughout shift in no apparent distress. Pt. scheduled for CT with coronaries. Plan of care reviewed with pt. understanding was voiced. Cardiac monitoring ongoing. IVL. Safety maintained. Will continue to monitor.

## 2020-09-16 NOTE — ED ADULT NURSE REASSESSMENT NOTE - NS ED NURSE REASSESS COMMENT FT1
Pt being transported to cath lab at this time, report given to cath lab RN, okay to go off tele for transport as per MAR.

## 2020-09-16 NOTE — CHART NOTE - NSCHARTNOTEFT_GEN_A_CORE
PRE-OP DIAGNOSIS: Angina / positive CCTA    PROCEDURE: [X] LHC                         [X] Coronary angiography                         [  ] [facility]  Physician: Dr. King   Fellow: Dr. Dong  Interventional Fellow: Dr. Varghese    ANESTHESIA TYPE:  [  ]General Anesthesia  [ x ] Sedation  [  ] Local/Regional    ESTIMATED BLOOD LOSS:    10   mL    CONDITION  [  ] Critical  [  ] Serious  [  ]Fair  [X]Good    IV CONTRAST:        70     mL    FINDINGS  Left Heart Catheterization:  LVEDP: 10  Significant single vessel disease    ACCESS:    [X] right radial artery  [ ] right femoral artery    LEFT HEART CATHETERIZATION                                    Left main: Normal  LAD: 70% mid LAD stenosis; iFR 0.87; s/p PCI with PRAVEEN  Diag: Mild luminal irregularities  Left Circumflex: Mild luminal irregularities  OM: Mild luminal irregularities  Right Coronary Artery: Mild luminal irregularities    INTERVENTION: PRAVEEN to LAD  IMPLANTS: 3.5 X 16 Synergy RX    SPECIMEN REMOVED: N/A    POST-OP DIAGNOSIS: significant single vessel disease    PLAN OF CARE  [X] Return to In-patient bed  [X] NS at 100cc/hr for 1L  [X] Aspirin 81mg daily, Plavix 75mg daily, Atorvastatin 80mg daily PRE-OP DIAGNOSIS: Angina / positive CCTA    PROCEDURE: [X] LHC                         [X] Coronary angiography                         [  ] [facility]  Physician: Dr. King   Fellow: Dr. Dong  Interventional Fellow: Dr. Varghese    ANESTHESIA TYPE:  [  ]General Anesthesia  [ x ] Sedation  [  ] Local/Regional    ESTIMATED BLOOD LOSS:    10   mL    CONDITION  [  ] Critical  [  ] Serious  [  ]Fair  [X]Good    IV CONTRAST:        70     mL    FINDINGS  Left Heart Catheterization:  LVEDP: 10  Significant single vessel disease    ACCESS:    [X] right radial artery  [ ] right femoral artery    LEFT HEART CATHETERIZATION                                    Left main: Normal  LAD: 70% mid LAD stenosis; iFR 0.87; s/p PCI with PRAVEEN  Diag: Mild luminal irregularities  Left Circumflex: Mild luminal irregularities  OM: Mild luminal irregularities  Right Coronary Artery: Mild luminal irregularities    INTERVENTION: PRAVEEN to LAD  IMPLANTS: 3.5 X 16 Synergy RX    SPECIMEN REMOVED: N/A    POST-OP DIAGNOSIS: significant single vessel disease    PLAN OF CARE  [X] Return to In-patient bed  [X] NS at 100cc/hr for 1L  [X] Aspirin 81mg daily, Plavix 75mg daily, Atorvastatin 80mg daily  [X] DVT / GI prophylaxis

## 2020-09-16 NOTE — ED CDU PROVIDER SUBSEQUENT DAY NOTE - MEDICAL DECISION MAKING DETAILS
55 female here for chest pain. Had ED eval with screening labs imaging and EKG with plan for EDOU care. No clinical changes.

## 2020-09-16 NOTE — ED CDU PROVIDER DISPOSITION NOTE - CLINICAL COURSE
55 female here for chest pain had ED management including labs imaging EKG monitoring and supportive care with plan for EDOU care for low risk chest pain protocol. No acute clinical issues. Found to have new CCTA readings and plan is for medical admission with cardiology consultation.

## 2020-09-16 NOTE — ED ADULT NURSE REASSESSMENT NOTE - NS ED NURSE REASSESS COMMENT FT1
Patient resting comfortably. Patient medicated with no further complaints of pain. Continuous cardiac monitoring in place with stable vs. Plan of care discussed with patient for observation.

## 2020-09-16 NOTE — ED ADULT NURSE REASSESSMENT NOTE - NS ED NURSE REASSESS COMMENT FT1
Pt reassessed, A/Ox3, VSS, pending CTA, no signs of acute distress noted, continuous cardiac monitor in place, comfort ands safety maintained, will continue to monitor.

## 2020-09-16 NOTE — H&P ADULT - ATTENDING COMMENTS
Patient seen and examined independently. Resident's H & P reviewed. Agree with the findings and plan of care except,    A 55 years old female presented to the ED with CP which woke her up from sleep 2 days ago. Pain was constant and radiated to L scapular region and back    CE x 2 negative  CCTA: Noncalcified mod to severe lesion in proximal LAD  EKG: NSR @ 1010/min. (Interpreted by me.)  Tri   LDL: 111    ASSESSMENT:     1. USA  2. DL  3. Single vessel CAD      PLAN:    . S/P LAD PRAVEEN  . On ASA, Plavix and Lipitor 80 mg po qhs  . ECHO  . Possible D/C today if cleared by cardiology Patient seen and examined independently. Resident's H & P reviewed. Agree with the findings and plan of care except,    A 55 years old female presented to the ED with CP which woke her up from sleep 2 days ago. Pain was constant and radiated to L scapular region and back    CE x 2 negative  CCTA: Noncalcified mod to severe lesion in proximal LAD  EKG: NSR @ 1010/min. (Interpreted by me.)  Tri   LDL: 111    ASSESSMENT:     1. USA  2. DL  3. Single vessel CAD  4. Anemia due to acute blood loss      PLAN:    . S/P cath yesterday. Was bleeding from cath site last night. Bleeding has stopped now. Rt arm US.   .  Drop in Hb. Follow H/H  . S/P LAD PRAVEEN  . On ASA, Plavix and Lipitor 80 mg po qhs  . ECHO  . Care D/W the cardiology. Cont in CCU today

## 2020-09-16 NOTE — ED CDU PROVIDER DISPOSITION NOTE - ATTENDING CONTRIBUTION TO CARE
I personally evaluated the patient. I reviewed the Resident’s or Physician Assistant’s note (as assigned above), and agree with the findings and plan except as documented in my note.     55 female here for chest pain had ED management including labs imaging EKG monitoring and supportive care with plan for EDOU care for low risk chest pain protocol. No acute clinical issues. Found to have new CCTA readings and plan is for medical admission with cardiology consultation.

## 2020-09-17 LAB
ANION GAP SERPL CALC-SCNC: 13 MMOL/L — SIGNIFICANT CHANGE UP (ref 7–14)
BASOPHILS # BLD AUTO: 0.03 K/UL — SIGNIFICANT CHANGE UP (ref 0–0.2)
BASOPHILS NFR BLD AUTO: 0.4 % — SIGNIFICANT CHANGE UP (ref 0–1)
BUN SERPL-MCNC: 15 MG/DL — SIGNIFICANT CHANGE UP (ref 10–20)
CALCIUM SERPL-MCNC: 9.3 MG/DL — SIGNIFICANT CHANGE UP (ref 8.5–10.1)
CHLORIDE SERPL-SCNC: 109 MMOL/L — SIGNIFICANT CHANGE UP (ref 98–110)
CHOLEST SERPL-MCNC: 185 MG/DL — SIGNIFICANT CHANGE UP (ref 100–200)
CO2 SERPL-SCNC: 21 MMOL/L — SIGNIFICANT CHANGE UP (ref 17–32)
CREAT SERPL-MCNC: 0.7 MG/DL — SIGNIFICANT CHANGE UP (ref 0.7–1.5)
CULTURE RESULTS: SIGNIFICANT CHANGE UP
EOSINOPHIL # BLD AUTO: 0.25 K/UL — SIGNIFICANT CHANGE UP (ref 0–0.7)
EOSINOPHIL NFR BLD AUTO: 3.4 % — SIGNIFICANT CHANGE UP (ref 0–8)
GLUCOSE SERPL-MCNC: 89 MG/DL — SIGNIFICANT CHANGE UP (ref 70–99)
HCT VFR BLD CALC: 36.5 % — LOW (ref 37–47)
HCV AB S/CO SERPL IA: 0.04 COI — SIGNIFICANT CHANGE UP
HCV AB SERPL-IMP: SIGNIFICANT CHANGE UP
HDLC SERPL-MCNC: 37 MG/DL — LOW
HGB BLD-MCNC: 12 G/DL — SIGNIFICANT CHANGE UP (ref 12–16)
IMM GRANULOCYTES NFR BLD AUTO: 0.3 % — SIGNIFICANT CHANGE UP (ref 0.1–0.3)
LACTATE SERPL-SCNC: 1.1 MMOL/L — SIGNIFICANT CHANGE UP (ref 0.7–2)
LIPID PNL WITH DIRECT LDL SERPL: 111 MG/DL — SIGNIFICANT CHANGE UP (ref 4–129)
LYMPHOCYTES # BLD AUTO: 2.26 K/UL — SIGNIFICANT CHANGE UP (ref 1.2–3.4)
LYMPHOCYTES # BLD AUTO: 31 % — SIGNIFICANT CHANGE UP (ref 20.5–51.1)
MAGNESIUM SERPL-MCNC: 2 MG/DL — SIGNIFICANT CHANGE UP (ref 1.8–2.4)
MCHC RBC-ENTMCNC: 30 PG — SIGNIFICANT CHANGE UP (ref 27–31)
MCHC RBC-ENTMCNC: 32.9 G/DL — SIGNIFICANT CHANGE UP (ref 32–37)
MCV RBC AUTO: 91.3 FL — SIGNIFICANT CHANGE UP (ref 81–99)
MONOCYTES # BLD AUTO: 0.49 K/UL — SIGNIFICANT CHANGE UP (ref 0.1–0.6)
MONOCYTES NFR BLD AUTO: 6.7 % — SIGNIFICANT CHANGE UP (ref 1.7–9.3)
NEUTROPHILS # BLD AUTO: 4.23 K/UL — SIGNIFICANT CHANGE UP (ref 1.4–6.5)
NEUTROPHILS NFR BLD AUTO: 58.2 % — SIGNIFICANT CHANGE UP (ref 42.2–75.2)
NRBC # BLD: 0 /100 WBCS — SIGNIFICANT CHANGE UP (ref 0–0)
PLATELET # BLD AUTO: 211 K/UL — SIGNIFICANT CHANGE UP (ref 130–400)
POTASSIUM SERPL-MCNC: 4.4 MMOL/L — SIGNIFICANT CHANGE UP (ref 3.5–5)
POTASSIUM SERPL-SCNC: 4.4 MMOL/L — SIGNIFICANT CHANGE UP (ref 3.5–5)
RBC # BLD: 4 M/UL — LOW (ref 4.2–5.4)
RBC # FLD: 12.7 % — SIGNIFICANT CHANGE UP (ref 11.5–14.5)
SODIUM SERPL-SCNC: 143 MMOL/L — SIGNIFICANT CHANGE UP (ref 135–146)
SPECIMEN SOURCE: SIGNIFICANT CHANGE UP
TOTAL CHOLESTEROL/HDL RATIO MEASUREMENT: 5 RATIO — SIGNIFICANT CHANGE UP (ref 4–5.5)
TRIGL SERPL-MCNC: 260 MG/DL — HIGH (ref 10–149)
TSH SERPL-MCNC: 2.32 UIU/ML — SIGNIFICANT CHANGE UP (ref 0.27–4.2)
WBC # BLD: 7.28 K/UL — SIGNIFICANT CHANGE UP (ref 4.8–10.8)
WBC # FLD AUTO: 7.28 K/UL — SIGNIFICANT CHANGE UP (ref 4.8–10.8)

## 2020-09-17 PROCEDURE — 93931 UPPER EXTREMITY STUDY: CPT | Mod: 26,RT

## 2020-09-17 PROCEDURE — 99233 SBSQ HOSP IP/OBS HIGH 50: CPT

## 2020-09-17 PROCEDURE — 93010 ELECTROCARDIOGRAM REPORT: CPT

## 2020-09-17 PROCEDURE — 93306 TTE W/DOPPLER COMPLETE: CPT | Mod: 26

## 2020-09-17 PROCEDURE — 99223 1ST HOSP IP/OBS HIGH 75: CPT

## 2020-09-17 RX ORDER — CHLORHEXIDINE GLUCONATE 213 G/1000ML
1 SOLUTION TOPICAL
Refills: 0 | Status: DISCONTINUED | OUTPATIENT
Start: 2020-09-17 | End: 2020-09-18

## 2020-09-17 RX ORDER — MORPHINE SULFATE 50 MG/1
2 CAPSULE, EXTENDED RELEASE ORAL ONCE
Refills: 0 | Status: DISCONTINUED | OUTPATIENT
Start: 2020-09-17 | End: 2020-09-17

## 2020-09-17 RX ORDER — OXYCODONE AND ACETAMINOPHEN 5; 325 MG/1; MG/1
1 TABLET ORAL EVERY 4 HOURS
Refills: 0 | Status: DISCONTINUED | OUTPATIENT
Start: 2020-09-17 | End: 2020-09-17

## 2020-09-17 RX ORDER — OXYCODONE AND ACETAMINOPHEN 5; 325 MG/1; MG/1
2 TABLET ORAL EVERY 4 HOURS
Refills: 0 | Status: DISCONTINUED | OUTPATIENT
Start: 2020-09-17 | End: 2020-09-18

## 2020-09-17 RX ORDER — NALOXEGOL OXALATE 12.5 MG/1
25 TABLET, FILM COATED ORAL DAILY
Refills: 0 | Status: DISCONTINUED | OUTPATIENT
Start: 2020-09-17 | End: 2020-09-18

## 2020-09-17 RX ORDER — OXYCODONE AND ACETAMINOPHEN 5; 325 MG/1; MG/1
1 TABLET ORAL ONCE
Refills: 0 | Status: DISCONTINUED | OUTPATIENT
Start: 2020-09-17 | End: 2020-09-17

## 2020-09-17 RX ORDER — OXYCODONE AND ACETAMINOPHEN 5; 325 MG/1; MG/1
2 TABLET ORAL ONCE
Refills: 0 | Status: DISCONTINUED | OUTPATIENT
Start: 2020-09-17 | End: 2020-09-17

## 2020-09-17 RX ADMIN — OXYCODONE AND ACETAMINOPHEN 1 TABLET(S): 5; 325 TABLET ORAL at 11:40

## 2020-09-17 RX ADMIN — CLOPIDOGREL BISULFATE 75 MILLIGRAM(S): 75 TABLET, FILM COATED ORAL at 12:14

## 2020-09-17 RX ADMIN — MORPHINE SULFATE 2 MILLIGRAM(S): 50 CAPSULE, EXTENDED RELEASE ORAL at 05:00

## 2020-09-17 RX ADMIN — OXYCODONE AND ACETAMINOPHEN 1 TABLET(S): 5; 325 TABLET ORAL at 10:42

## 2020-09-17 RX ADMIN — ATORVASTATIN CALCIUM 80 MILLIGRAM(S): 80 TABLET, FILM COATED ORAL at 21:42

## 2020-09-17 RX ADMIN — OXYCODONE AND ACETAMINOPHEN 2 TABLET(S): 5; 325 TABLET ORAL at 15:50

## 2020-09-17 RX ADMIN — NALOXEGOL OXALATE 25 MILLIGRAM(S): 12.5 TABLET, FILM COATED ORAL at 11:40

## 2020-09-17 RX ADMIN — MORPHINE SULFATE 2 MILLIGRAM(S): 50 CAPSULE, EXTENDED RELEASE ORAL at 05:15

## 2020-09-17 RX ADMIN — OXYCODONE AND ACETAMINOPHEN 2 TABLET(S): 5; 325 TABLET ORAL at 02:20

## 2020-09-17 RX ADMIN — OXYCODONE AND ACETAMINOPHEN 2 TABLET(S): 5; 325 TABLET ORAL at 01:40

## 2020-09-17 RX ADMIN — OXYCODONE AND ACETAMINOPHEN 2 TABLET(S): 5; 325 TABLET ORAL at 21:42

## 2020-09-17 RX ADMIN — OXYCODONE AND ACETAMINOPHEN 2 TABLET(S): 5; 325 TABLET ORAL at 22:45

## 2020-09-17 RX ADMIN — Medication 81 MILLIGRAM(S): at 12:14

## 2020-09-17 RX ADMIN — PANTOPRAZOLE SODIUM 40 MILLIGRAM(S): 20 TABLET, DELAYED RELEASE ORAL at 07:23

## 2020-09-17 RX ADMIN — CHLORHEXIDINE GLUCONATE 1 APPLICATION(S): 213 SOLUTION TOPICAL at 07:23

## 2020-09-17 RX ADMIN — OXYCODONE AND ACETAMINOPHEN 2 TABLET(S): 5; 325 TABLET ORAL at 16:20

## 2020-09-17 NOTE — PROGRESS NOTE ADULT - SUBJECTIVE AND OBJECTIVE BOX
Cardiology Follow up    KVNG THOMAS   55y Female  PAST MEDICAL & SURGICAL HISTORY:  Asthma  no flare yrs no meds    Carpal tunnel syndrome of left wrist  s/p rt ctr 5/17    S/P lumbar discectomy    H/O arthroscopy of shoulder  rt 2017       HPI:  55 year old female patient presented for chest pain.    Known with a history of asthma, smoker (not daily).  Family history of premature CAD in mother (~age 59)      Current history goes back to 2 days prp when the patient experienced acute onset retrosternal / left chest pain that woke her up from sleep, radiating toward L scapular/back, constant since then without resolution. Worse with deep breaths and leaning forward,  not reproducible on palpation.      Pt states she exercises frequently,  no symptoms with exercise.    No recent fevers / illnesses / gi symptoms, but states she started having a cough while in ED today.   Admitted to the ER, troponins -ve, CCTA showing noncalcified mod-sev lesion in prox LAD.      (16 Sep 2020 18:03)    Allergies    No Known Allergies    Intolerances    Patient seen and examined at bedside.     Patient reported stabbing chest discomfort that radiated to her back when taking deep breath or moving, relieved by prn Percocet  Right arm swelling decreased from yesterday, elevated on pillows   Denies SOB, palpitations, or dizziness  Bradycardia events on telemetry overnight    Vital Signs Last 24 Hrs  T(C): 36.4 (17 Sep 2020 08:00), Max: 37.2 (16 Sep 2020 21:50)  T(F): 97.6 (17 Sep 2020 08:00), Max: 99 (16 Sep 2020 21:50)  HR: 64 (17 Sep 2020 08:00) (56 - 66)  BP: 104/66 (17 Sep 2020 08:00) (89/58 - 113/63)  BP(mean): 77 (17 Sep 2020 08:00) (64 - 92)  RR: 12 (17 Sep 2020 08:00) (12 - 22)  SpO2: 99% (17 Sep 2020 08:00) (97% - 100%)    MEDICATIONS  (STANDING):  aspirin enteric coated 81 milliGRAM(s) Oral daily  atorvastatin 80 milliGRAM(s) Oral at bedtime  chlorhexidine 4% Liquid 1 Application(s) Topical <User Schedule>  clopidogrel Tablet 75 milliGRAM(s) Oral daily  enoxaparin Injectable 40 milliGRAM(s) SubCutaneous at bedtime  influenza   Vaccine 0.5 milliLiter(s) IntraMuscular once  pantoprazole    Tablet 40 milliGRAM(s) Oral before breakfast  sodium chloride 0.9%. 1000 milliLiter(s) (100 mL/Hr) IV Continuous <Continuous>    MEDICATIONS  (PRN):  ALBUTerol    90 MICROgram(s) HFA Inhaler 2 Puff(s) Inhalation every 6 hours PRN Bronchospasm  oxycodone    5 mG/acetaminophen 325 mG 1 Tablet(s) Oral every 4 hours PRN Severe Pain (7 - 10)  oxycodone    5 mG/acetaminophen 325 mG 1 Tablet(s) Oral every 6 hours PRN Moderate Pain (4 - 6)  oxycodone    5 mG/acetaminophen 325 mG 1 Tablet(s) Oral every 6 hours PRN Moderate Pain (4 - 6)      REVIEW OF SYSTEMS:          All negative except as mentioned in HPI    PHYSICAL EXAM:           CONSTITUTIONAL: Well-developed; well-nourished; in no acute distress  	SKIN: warm, dry  	HEAD: Normocephalic; atraumatic  	EYES: PERRL.  	ENT: No nasal discharge, airway clear, mucous membranes moist  	NECK: Supple; non tender.  	CARD: +S1, +S2, no murmurs, gallops, or rubs. Regular rate and rhythm    	RESP: No wheezes, rales or rhonchi. CTA B/L  	ABD: soft ntnd, + BS x 4 quadrants  	EXT: moves all extremities,  no clubbing, cyanosis or edema  	NEURO: Alert and oriented x3, no focal deficits          PSYCH: Cooperative, appropriate          VASCULAR:  + Rad / + PTs / +  DPs          EXTREMITY:             Right Radial: dressing removed, access site swollen and tender to touch, soft, + pulses, no sign of infection, no numbness, + capillary refill             ECG:   ec< from: 12 Lead ECG (09.17.20 @ 06:17) >  Ventricular Rate 62 BPM    Atrial Rate 62 BPM    P-R Interval 132 ms    QRS Duration 82 ms    Q-T Interval 416 ms    QTC Calculation(Bazett) 422 ms    P Axis 70 degrees    R Axis 70 degrees    T Axis 63 degrees    Diagnosis Line Normal sinus rhythm  Normal ECG    Confirmed by Ace Lopez ([id number]) on 9/17/2020 8:21:03 AM                                                                                      2D ECHO:  P    LABS:                        12.0   7.28  )-----------( 211      ( 17 Sep 2020 04:24 )             36.5     09-17    143  |  109  |  15  ----------------------------<  89  4.4   |  21  |  0.7    Ca    9.3      17 Sep 2020 04:24  Mg     2.0     09-17    TPro  7.4  /  Alb  4.7  /  TBili  0.3  /  DBili  x   /  AST  22  /  ALT  15  /  AlkPhos  68  09-15    CARDIAC MARKERS ( 17 Sep 2020 04:24 )  x     / x     / 114 U/L / x     / x      CARDIAC MARKERS ( 15 Sep 2020 17:38 )  x     / <0.01 ng/mL / x     / x     / x      CARDIAC MARKERS ( 15 Sep 2020 12:00 )  x     / <0.01 ng/mL / x     / x     / x        Magnesium, Serum: 2.0 mg/dL [1.8 - 2.4] (09-17-20 @ 04:24)  LIVER FUNCTIONS - ( 15 Sep 2020 12:00 )  Alb: 4.7 g/dL / Pro: 7.4 g/dL / ALK PHOS: 68 U/L / ALT: 15 U/L / AST: 22 U/L / GGT: x         Lipid Profile in AM (09.17.20 @ 04:24)   Total Cholesterol/HDL Ratio Measurement: 5.0 Ratio   Cholesterol, Serum: 185 mg/dL   Triglycerides, Serum: 260 mg/dL   HDL Cholesterol, Serum: 37: HDL Levels >/= 60 mg/dL are considered beneficial and a "negative" risk   factor.   Effective 08/15/2018: New reference range and interpretive comment. mg/dL   Direct LDL: 111      A/P:  I discussed the case with Cardiologist Dr. King and recommend the following:    S/P PCI:   Left main: Normal  LAD: 70% mid LAD stenosis; iFR 0.87; s/p PCI with PRAVEEN  Diag: Mild luminal irregularities  Left Circumflex: Mild luminal irregularities  OM: Mild luminal irregularities  Right Coronary Artery: Mild luminal irregularities    INTERVENTION: PRAVEEN to LAD  IMPLANTS: 3.5 X 16 Synergy RX    POST-OP DIAGNOSIS: significant single vessel disease                                   OOB to chair, Ambulate with assistance                   Pain management with prn Percocet                    F/U 2D Echo results and A1C                   F/U Arterial duplex of right arm                    NO BB due to Bradycardiac events and Asthma                   elevate right arm on two pillows while in bed   	     Continue DAPT ( Aspirin 81 mg daily and Plavix 75 mg daily ), Statin Therapy                   Patient given 30 day supply of ( Aspirin 81 mg daily and Plavix 75 mg daily ) to take at home                   Patient agreeing to take DAPT for at least one year or as directed by cardiologist                    Pt given instructions on importance of taking antiplatelet medication or risk acute stent thrombosis/death                   Post cath instructions, access site care and activity restrictions reviewed with patient                     Discussed with patient to report  to RN/MD if experiences chest pain, shortness breath, dizziness and site bleeding                   Aggressive risk factor modification, diet counseling, smoking cessation discussed with patient                       Monitor in CCU                                      Cardiology Follow up    KVNG THOMAS   55y Female  PAST MEDICAL & SURGICAL HISTORY:  Asthma  no flare yrs no meds    Carpal tunnel syndrome of left wrist  s/p rt ctr 5/17    S/P lumbar discectomy    H/O arthroscopy of shoulder  rt 2017       HPI:  55 year old female patient presented for chest pain.    Known with a history of asthma, smoker (not daily).  Family history of premature CAD in mother (~age 59)      Current history goes back to 2 days prp when the patient experienced acute onset retrosternal / left chest pain that woke her up from sleep, radiating toward L scapular/back, constant since then without resolution. Worse with deep breaths and leaning forward,  not reproducible on palpation.      Pt states she exercises frequently,  no symptoms with exercise.    No recent fevers / illnesses / gi symptoms, but states she started having a cough while in ED today.   Admitted to the ER, troponins -ve, CCTA showing noncalcified mod-sev lesion in prox LAD.      (16 Sep 2020 18:03)    Allergies    No Known Allergies    Intolerances    Patient seen and examined at bedside.     Patient reported stabbing chest discomfort that radiated to her back when taking deep breath or moving, relieved by prn Percocet  Right arm swelling decreased from yesterday, elevated on pillows   Denies SOB, palpitations, or dizziness  Bradycardia events on telemetry overnight    Vital Signs Last 24 Hrs  T(C): 36.4 (17 Sep 2020 08:00), Max: 37.2 (16 Sep 2020 21:50)  T(F): 97.6 (17 Sep 2020 08:00), Max: 99 (16 Sep 2020 21:50)  HR: 64 (17 Sep 2020 08:00) (56 - 66)  BP: 104/66 (17 Sep 2020 08:00) (89/58 - 113/63)  BP(mean): 77 (17 Sep 2020 08:00) (64 - 92)  RR: 12 (17 Sep 2020 08:00) (12 - 22)  SpO2: 99% (17 Sep 2020 08:00) (97% - 100%)    MEDICATIONS  (STANDING):  aspirin enteric coated 81 milliGRAM(s) Oral daily  atorvastatin 80 milliGRAM(s) Oral at bedtime  chlorhexidine 4% Liquid 1 Application(s) Topical <User Schedule>  clopidogrel Tablet 75 milliGRAM(s) Oral daily  enoxaparin Injectable 40 milliGRAM(s) SubCutaneous at bedtime  influenza   Vaccine 0.5 milliLiter(s) IntraMuscular once  pantoprazole    Tablet 40 milliGRAM(s) Oral before breakfast  sodium chloride 0.9%. 1000 milliLiter(s) (100 mL/Hr) IV Continuous <Continuous>    MEDICATIONS  (PRN):  ALBUTerol    90 MICROgram(s) HFA Inhaler 2 Puff(s) Inhalation every 6 hours PRN Bronchospasm  oxycodone    5 mG/acetaminophen 325 mG 1 Tablet(s) Oral every 4 hours PRN Severe Pain (7 - 10)  oxycodone    5 mG/acetaminophen 325 mG 1 Tablet(s) Oral every 6 hours PRN Moderate Pain (4 - 6)  oxycodone    5 mG/acetaminophen 325 mG 1 Tablet(s) Oral every 6 hours PRN Moderate Pain (4 - 6)      REVIEW OF SYSTEMS:          All negative except as mentioned in HPI    PHYSICAL EXAM:           CONSTITUTIONAL: Well-developed; well-nourished; in no acute distress  	SKIN: warm, dry  	HEAD: Normocephalic; atraumatic  	EYES: PERRL.  	ENT: No nasal discharge, airway clear, mucous membranes moist  	NECK: Supple; non tender.  	CARD: +S1, +S2, no murmurs, gallops, or rubs. Regular rate and rhythm    	RESP: No wheezes, rales or rhonchi. CTA B/L  	ABD: soft ntnd, + BS x 4 quadrants  	EXT: moves all extremities,  no clubbing, cyanosis or edema  	NEURO: Alert and oriented x3, no focal deficits          PSYCH: Cooperative, appropriate          VASCULAR:  + Rad / + PTs / +  DPs          EXTREMITY:             Right Radial: dressing removed, access site swollen and tender to touch, soft, + pulses, no sign of infection, no numbness, + capillary refill             ECG:   ec< from: 12 Lead ECG (09.17.20 @ 06:17) >  Ventricular Rate 62 BPM    Atrial Rate 62 BPM    P-R Interval 132 ms    QRS Duration 82 ms    Q-T Interval 416 ms    QTC Calculation(Bazett) 422 ms    P Axis 70 degrees    R Axis 70 degrees    T Axis 63 degrees    Diagnosis Line Normal sinus rhythm  Normal ECG    Confirmed by Ace Lopez ([id number]) on 9/17/2020 8:21:03 AM                                                                                      2D ECHO:  P    LABS:                        12.0   7.28  )-----------( 211      ( 17 Sep 2020 04:24 )             36.5     09-17    143  |  109  |  15  ----------------------------<  89  4.4   |  21  |  0.7    Ca    9.3      17 Sep 2020 04:24  Mg     2.0     09-17    TPro  7.4  /  Alb  4.7  /  TBili  0.3  /  DBili  x   /  AST  22  /  ALT  15  /  AlkPhos  68  09-15    CARDIAC MARKERS ( 17 Sep 2020 04:24 )  x     / x     / 114 U/L / x     / x      CARDIAC MARKERS ( 15 Sep 2020 17:38 )  x     / <0.01 ng/mL / x     / x     / x      CARDIAC MARKERS ( 15 Sep 2020 12:00 )  x     / <0.01 ng/mL / x     / x     / x        Magnesium, Serum: 2.0 mg/dL [1.8 - 2.4] (09-17-20 @ 04:24)  LIVER FUNCTIONS - ( 15 Sep 2020 12:00 )  Alb: 4.7 g/dL / Pro: 7.4 g/dL / ALK PHOS: 68 U/L / ALT: 15 U/L / AST: 22 U/L / GGT: x         Lipid Profile in AM (09.17.20 @ 04:24)   Total Cholesterol/HDL Ratio Measurement: 5.0 Ratio   Cholesterol, Serum: 185 mg/dL   Triglycerides, Serum: 260 mg/dL   HDL Cholesterol, Serum: 37: HDL Levels >/= 60 mg/dL are considered beneficial and a "negative" risk   factor.   Effective 08/15/2018: New reference range and interpretive comment. mg/dL   Direct LDL: 111      A/P:  I discussed the case with Cardiologist Dr. King and recommend the following:    S/P PCI:   Left main: Normal  LAD: 70% mid LAD stenosis; iFR 0.87; s/p PCI with PRAVEEN  Diag: Mild luminal irregularities  Left Circumflex: Mild luminal irregularities  OM: Mild luminal irregularities  Right Coronary Artery: Mild luminal irregularities    INTERVENTION: PRAVEEN to LAD  IMPLANTS: 3.5 X 16 Synergy RX    POST-OP DIAGNOSIS: significant single vessel disease                                   OOB to chair, Ambulate with assistance                   Pain management with prn Percocet                    F/U 2D Echo results and A1C                   F/U Arterial duplex of right arm                    NO BB due to Bradycardiac events and Asthma                   elevate right arm on two pillows while in bed                    GI / DVT prophylaxis   	     Continue DAPT ( Aspirin 81 mg daily and Plavix 75 mg daily ), Statin Therapy                   Patient given 30 day supply of ( Aspirin 81 mg daily and Plavix 75 mg daily ) to take at home                   Patient agreeing to take DAPT for at least one year or as directed by cardiologist                    Pt given instructions on importance of taking antiplatelet medication or risk acute stent thrombosis/death                   Post cath instructions, access site care and activity restrictions reviewed with patient                     Discussed with patient to report  to RN/MD if experiences chest pain, shortness breath, dizziness and site bleeding                   Aggressive risk factor modification, diet counseling, smoking cessation discussed with patient                       Monitor in CCU

## 2020-09-17 NOTE — PROGRESS NOTE ADULT - ASSESSMENT
55 year old female patient presenting for chest pain for the past 2 days. EKG and troponins on arrival were negative. CT angio showed 70-80% stenosis in LAD, cath procedure was performed and stent was placed. Patient developed a right radial sided hematoma from entry site. Patient is in currently in the CCU for monitoring s/p cath and hematoma.      # Chest pain secondary to CAD, resolving  - Coronary angiography showed mid LAD 70-80% stenosis, iFR showed lesion hemodynamically significant  - Cath procedure: stent placed in mid LAD  - Continue with Aspirin 81mg , Plavix 75mg, Atorvastatin 80mg  - Lipid panel: Decreased HDL (37), Increased triglycerides (260)   - Echo done --> pending official read  - Beta blocker held due to bradycardia and asthma  - Continue to monitor    #Hematoma secondary to cath procedure, acute  - Hematoma on right radial side from cath procedure, no signs of acute compartment syndrome  - RUE Duplex to rule out pseudoaneurysm   - Percocet 325mg q6 for pain  - Keep arm elevated  - Continue to monitor    # Asthma, chronic  - Albuterol PRN  - Avoid beta blockers    #Tobacco use disorder, chronic  - Smoking cessation counseled    # Diet: DASH/TLC  # DVT prophylaxis: Lovenox 40mg qd  # GI prophylaxis: Protonix 40mg qd  # Activity: IAT  # Code: Full

## 2020-09-17 NOTE — PROGRESS NOTE ADULT - SUBJECTIVE AND OBJECTIVE BOX
KVNG THOMAS 55y Female  MRN#: [id number]     SUBJECTIVE  Patient is a 55y old Female who presents with a chief complaint of Chest pain (17 Sep 2020 09:44)    Currently admitted to medicine with the primary diagnosis of Chest pain     Patient is examined in bed on hospital day 1d and is resting comfortably. Patient still endorses pain on right radial side due to hematoma from cath procedure. Her right arm is currently elevated on two pillows. Patient also states resolution of her chest pain after the procedure. Review of systems is otherwise negative.    Overnight: Patient had complaints of pain due to her hematoma throughout the night. Morphine was given and switched to Percocet in the AM. No other events noted.      OBJECTIVE  PAST MEDICAL & SURGICAL HISTORY  Asthma  no flare yrs no meds    Carpal tunnel syndrome of left wrist  s/p rt ctr     S/P lumbar discectomy    H/O arthroscopy of shoulder  rt       ALLERGIES:  No Known Allergies    MEDICATIONS:  STANDING MEDICATIONS  aspirin enteric coated 81 milliGRAM(s) Oral daily  atorvastatin 80 milliGRAM(s) Oral at bedtime  chlorhexidine 4% Liquid 1 Application(s) Topical <User Schedule>  clopidogrel Tablet 75 milliGRAM(s) Oral daily  enoxaparin Injectable 40 milliGRAM(s) SubCutaneous at bedtime  influenza   Vaccine 0.5 milliLiter(s) IntraMuscular once  pantoprazole    Tablet 40 milliGRAM(s) Oral before breakfast  sodium chloride 0.9%. 1000 milliLiter(s) IV Continuous <Continuous>    PRN MEDICATIONS  ALBUTerol    90 MICROgram(s) HFA Inhaler 2 Puff(s) Inhalation every 6 hours PRN  oxycodone    5 mG/acetaminophen 325 mG 1 Tablet(s) Oral every 4 hours PRN  oxycodone    5 mG/acetaminophen 325 mG 1 Tablet(s) Oral every 6 hours PRN  oxycodone    5 mG/acetaminophen 325 mG 1 Tablet(s) Oral every 6 hours PRN      VITAL SIGNS: Last 24 Hours  T(C): 36.4 (17 Sep 2020 08:00), Max: 37.2 (16 Sep 2020 21:50)  T(F): 97.6 (17 Sep 2020 08:00), Max: 99 (16 Sep 2020 21:50)  HR: 64 (17 Sep 2020 08:00) (56 - 66)  BP: 104/66 (17 Sep 2020 08:00) (89/58 - 113/63)  BP(mean): 77 (17 Sep 2020 08:00) (64 - 92)  RR: 12 (17 Sep 2020 08:00) (12 - 22)  SpO2: 99% (17 Sep 2020 08:00) (97% - 100%)    LABS:                        12.0   7.28  )-----------( 211      ( 17 Sep 2020 04:24 )             36.5     -17    143  |  109  |  15  ----------------------------<  89  4.4   |  21  |  0.7    Ca    9.3      17 Sep 2020 04:24  Mg     2.0         TPro  7.4  /  Alb  4.7  /  TBili  0.3  /  DBili  x   /  AST  22  /  ALT  15  /  AlkPhos  68  09-15      Urinalysis Basic - ( 15 Sep 2020 14:31 )    Color: Colorless / Appearance: Clear / S.007 / pH: x  Gluc: x / Ketone: Negative  / Bili: Negative / Urobili: <2 mg/dL   Blood: x / Protein: Negative / Nitrite: Negative   Leuk Esterase: Negative / RBC: x / WBC x   Sq Epi: x / Non Sq Epi: x / Bacteria: x        Creatine Kinase, Serum: 114 U/L (20 @ 04:24)  Lactate, Blood: 1.1 mmol/L (20 @ 04:20)      Culture - Urine (collected 15 Sep 2020 14:31)  Source: .Urine Clean Catch (Midstream)  Final Report (17 Sep 2020 03:12):    10,000 - 49,000 CFU/mL Streptococcus agalactiae (Group B) isolated    Group B streptococci are susceptible to ampicillin,    penicillin and cefazolin, but may be resistant to    erythromycin and clindamycin.    Recommendations for intrapartum prophylaxis for Group B    streptococci are penicillin or ampicillin.    <10,000 CFU/ml Normal Urogenital sean present      CARDIAC MARKERS ( 17 Sep 2020 04:24 )  x     / x     / 114 U/L / x     / x      CARDIAC MARKERS ( 15 Sep 2020 17:38 )  x     / <0.01 ng/mL / x     / x     / x      CARDIAC MARKERS ( 15 Sep 2020 12:00 )  x     / <0.01 ng/mL / x     / x     / x          RADIOLOGY:  < from: CT Heart with Coronaries (20 @ 10:44) >  IMPRESSION:    Noncalcified plaque within the proximal LAD contributing to moderate to severe stenosis.    Atherosclerotic disease elsewhere without significant stenosis    The total Agatston coronary artery calcium score equals 38, which corresponds to 93rd percentile for age, gender and ethnicity.    < end of copied text >      PHYSICAL EXAM:    GENERAL: NAD, well-developed, AAOx3  HEENT:  Atraumatic, Normocephalic. EOMI, PERRLA, conjunctiva and sclera clear, No JVD  PULMONARY: Clear to auscultation bilaterally; No wheeze  CARDIOVASCULAR: Regular rate and rhythm; No murmurs, rubs, or gallops  GASTROINTESTINAL: Soft, Nontender, Nondistended; Bowel sounds present  MUSCULOSKELETAL:  2+ Peripheral Pulses, No clubbing, cyanosis  NEUROLOGY: non-focal  SKIN: No rashes or lesions, hematoma noted on right radial side, no pallor, pulselessness, poikilothermia or paresthesias present

## 2020-09-18 ENCOUNTER — TRANSCRIPTION ENCOUNTER (OUTPATIENT)
Age: 55
End: 2020-09-18

## 2020-09-18 VITALS
TEMPERATURE: 99 F | SYSTOLIC BLOOD PRESSURE: 104 MMHG | DIASTOLIC BLOOD PRESSURE: 65 MMHG | RESPIRATION RATE: 17 BRPM | OXYGEN SATURATION: 100 % | HEART RATE: 72 BPM

## 2020-09-18 LAB
ANION GAP SERPL CALC-SCNC: 12 MMOL/L — SIGNIFICANT CHANGE UP (ref 7–14)
BUN SERPL-MCNC: 15 MG/DL — SIGNIFICANT CHANGE UP (ref 10–20)
CALCIUM SERPL-MCNC: 9.6 MG/DL — SIGNIFICANT CHANGE UP (ref 8.5–10.1)
CHLORIDE SERPL-SCNC: 104 MMOL/L — SIGNIFICANT CHANGE UP (ref 98–110)
CO2 SERPL-SCNC: 24 MMOL/L — SIGNIFICANT CHANGE UP (ref 17–32)
CREAT SERPL-MCNC: 0.8 MG/DL — SIGNIFICANT CHANGE UP (ref 0.7–1.5)
GLUCOSE SERPL-MCNC: 96 MG/DL — SIGNIFICANT CHANGE UP (ref 70–99)
HCT VFR BLD CALC: 38.3 % — SIGNIFICANT CHANGE UP (ref 37–47)
HGB BLD-MCNC: 12.8 G/DL — SIGNIFICANT CHANGE UP (ref 12–16)
MAGNESIUM SERPL-MCNC: 2 MG/DL — SIGNIFICANT CHANGE UP (ref 1.8–2.4)
MCHC RBC-ENTMCNC: 30.1 PG — SIGNIFICANT CHANGE UP (ref 27–31)
MCHC RBC-ENTMCNC: 33.4 G/DL — SIGNIFICANT CHANGE UP (ref 32–37)
MCV RBC AUTO: 90.1 FL — SIGNIFICANT CHANGE UP (ref 81–99)
NRBC # BLD: 0 /100 WBCS — SIGNIFICANT CHANGE UP (ref 0–0)
PLATELET # BLD AUTO: 226 K/UL — SIGNIFICANT CHANGE UP (ref 130–400)
POTASSIUM SERPL-MCNC: 4.3 MMOL/L — SIGNIFICANT CHANGE UP (ref 3.5–5)
POTASSIUM SERPL-SCNC: 4.3 MMOL/L — SIGNIFICANT CHANGE UP (ref 3.5–5)
RBC # BLD: 4.25 M/UL — SIGNIFICANT CHANGE UP (ref 4.2–5.4)
RBC # FLD: 12.7 % — SIGNIFICANT CHANGE UP (ref 11.5–14.5)
SODIUM SERPL-SCNC: 140 MMOL/L — SIGNIFICANT CHANGE UP (ref 135–146)
WBC # BLD: 5.34 K/UL — SIGNIFICANT CHANGE UP (ref 4.8–10.8)
WBC # FLD AUTO: 5.34 K/UL — SIGNIFICANT CHANGE UP (ref 4.8–10.8)

## 2020-09-18 PROCEDURE — 99232 SBSQ HOSP IP/OBS MODERATE 35: CPT

## 2020-09-18 PROCEDURE — 99239 HOSP IP/OBS DSCHRG MGMT >30: CPT

## 2020-09-18 RX ORDER — DOCUSATE SODIUM 100 MG
1 CAPSULE ORAL
Qty: 30 | Refills: 0
Start: 2020-09-18 | End: 2020-10-17

## 2020-09-18 RX ORDER — ATORVASTATIN CALCIUM 80 MG/1
1 TABLET, FILM COATED ORAL
Qty: 30 | Refills: 0
Start: 2020-09-18 | End: 2020-10-17

## 2020-09-18 RX ORDER — CLOPIDOGREL BISULFATE 75 MG/1
1 TABLET, FILM COATED ORAL
Qty: 30 | Refills: 0
Start: 2020-09-18 | End: 2020-10-17

## 2020-09-18 RX ORDER — ASPIRIN/CALCIUM CARB/MAGNESIUM 324 MG
1 TABLET ORAL
Qty: 30 | Refills: 0
Start: 2020-09-18 | End: 2020-10-17

## 2020-09-18 RX ADMIN — OXYCODONE AND ACETAMINOPHEN 2 TABLET(S): 5; 325 TABLET ORAL at 09:08

## 2020-09-18 RX ADMIN — OXYCODONE AND ACETAMINOPHEN 2 TABLET(S): 5; 325 TABLET ORAL at 09:23

## 2020-09-18 RX ADMIN — CHLORHEXIDINE GLUCONATE 1 APPLICATION(S): 213 SOLUTION TOPICAL at 06:48

## 2020-09-18 RX ADMIN — Medication 81 MILLIGRAM(S): at 11:33

## 2020-09-18 RX ADMIN — CLOPIDOGREL BISULFATE 75 MILLIGRAM(S): 75 TABLET, FILM COATED ORAL at 11:33

## 2020-09-18 RX ADMIN — PANTOPRAZOLE SODIUM 40 MILLIGRAM(S): 20 TABLET, DELAYED RELEASE ORAL at 06:48

## 2020-09-18 RX ADMIN — NALOXEGOL OXALATE 25 MILLIGRAM(S): 12.5 TABLET, FILM COATED ORAL at 11:52

## 2020-09-18 NOTE — DISCHARGE NOTE PROVIDER - NSDCMRMEDTOKEN_GEN_ALL_CORE_FT
aspirin 81 mg oral delayed release tablet: 1 tab(s) orally once a day  atorvastatin 80 mg oral tablet: 1 tab(s) orally once a day (at bedtime)   clopidogrel 75 mg oral tablet: 1 tab(s) orally once a day  Colace 100 mg oral capsule: 1 cap(s) orally once a day   Flomax 0.4 mg oral capsule: 1 cap(s) orally once a day   ketorolac 10 mg oral tablet: 1 tab(s) orally 4 times a day, As Needed -for moderate pain   Lyrica 50 mg oral capsule: 1 cap(s) orally 2 times a day  meloxicam 7.5 mg oral tablet: 1 tab(s) orally once a day  Percocet 10/325 oral tablet: 1 tab(s) orally 2 times a day

## 2020-09-18 NOTE — DISCHARGE NOTE PROVIDER - NSDCFUADDINST_GEN_ALL_CORE_FT
- On discharge:  --> Please continue Atorvastatin 80mg QD. Lipid profile on 09/17: , Cholesterol 185, HDL 37. Repeat in 6 months.  --> Please continue Aspirin 81mg QD and Plavix 75mg QD  --> Will not discharge on beta blockers since HR was on low side - On discharge:  --> Please continue Atorvastatin 80mg QD. Lipid profile on 09/17: , Cholesterol 185, HDL 37. Repeat in 6 months.  --> Please continue Aspirin 81mg QD and Plavix 75mg QD  --> Will not discharge on beta blockers since HR was on low side  --> Follow up with Dr. Yeboah in 2 weeks post discharge

## 2020-09-18 NOTE — DISCHARGE NOTE PROVIDER - CARE PROVIDER_API CALL
Yosvany King  CARDIOVASCULAR DISEASE  [address] [place] [address], [place] [address]  Phone: [phone]  Fax: [phone]  Follow Up Time:     Demetrius Gayle  [id number]  [address] [place] [address]  Phone: [phone]  Fax: [phone]  Follow Up Time:

## 2020-09-18 NOTE — DISCHARGE NOTE NURSING/CASE MANAGEMENT/SOCIAL WORK - PATIENT PORTAL LINK FT
You can access the FollowMyHealth Patient Portal offered by [facility] by registering at the following website: [email or web address]. By joining [employer]’s FollowMyHealth portal, you will also be able to view your health information using other applications (apps) compatible with our system.

## 2020-09-18 NOTE — DISCHARGE NOTE PROVIDER - HOSPITAL COURSE
Ms. Linton is a 55 year old female patient (active smoker) with asthma and family history of CAD in mother (age 59y) who presented on 09/15 following an acute onset of severe retrosternal chest pain radiating to scapula, found to have mod-severe proximal LAD stenosis on CT heart with coronaries on 09/16 s/p left sided heart catheterization on 09/16 revealing 70% mid LAD stenosis with iFR  0.87 s/p PCI with PRAVEEN, admitted for monitoring. Please refer to the below detailed overview of hospital course:      For Significant Vessel CAD (LAD)  * Troponin < 0.01 (09/15 at 12:00 PM) -> Troponin < 0.01 (09/15 at 17:00 PM)  * ECG x2 (09/15): NSR  * CT coronaries (09/16): 70% mid LAD stenosis with iFR 0.87   * s/p Left sided heart catheterization by Dr. Yeboah on 09/16: s/p PCI with PRAVEEN  * Complicated by Hematoma -> now resolved after hand elevation. Duplex performed on 09/17 ruled out hematoma     - On discharge:  --> Please continue Atorvastatin 80mg QD. Lipid profile on 09/17: , Cholesterol 185, HDL 37. Repeat in 6 months.  --> Please continue Aspirin 81mg QD and Plavix 75mg QD  --> Will not discharge on beta blockers since HR was on low side      For History of Lumbar Dissectomy  - We monitored pain and started Percocet at 10mg Q4h PRN on 09/17  - On discharge:  --> Will resume home dose  --> Will add colase and senna 2 tablets at bedtime to prevent constipation      For Asthma  - We monitored your SaO2 closely   - We started you on Albuterol 90 mcg 2 puffs Q6h PRN     Ms. Linton is a 55 year old female patient (active smoker) with asthma and family history of CAD in mother (age 59y) who presented on 09/15 following an acute onset of severe retrosternal chest pain radiating to scapula, found to have mod-severe proximal LAD stenosis on CT heart with coronaries on 09/16 s/p left sided heart catheterization on 09/16 revealing 70% mid LAD stenosis with iFR  0.87 s/p PCI with PRAVEEN, admitted for monitoring. Please refer to the below detailed overview of hospital course:      For Significant Vessel CAD (LAD)  * Troponin < 0.01 (09/15 at 12:00 PM) -> Troponin < 0.01 (09/15 at 17:00 PM)  * ECG x2 (09/15): NSR  * CT coronaries (09/16): 70% mid LAD stenosis with iFR 0.87   * s/p Left sided heart catheterization by Dr. Yeboah on 09/16: s/p PCI with PRAVEEN  * Complicated by Hematoma -> now resolved after hand elevation. Duplex performed on 09/17 ruled out hematoma     - On discharge:  --> Please continue Atorvastatin 80mg QD. Lipid profile on 09/17: , Cholesterol 185, HDL 37. Repeat in 6 months.  --> Please continue Aspirin 81mg QD and Plavix 75mg QD  --> Will not discharge on beta blockers since HR was on low side  --> Follow up with Dr. Yeboah in 2 weeks post discharge      For History of Lumbar Dissectomy  - We monitored pain and started Percocet at 10mg Q4h PRN on 09/17  - On discharge:  --> Will resume home dose  --> Will add colase and senna 2 tablets at bedtime to prevent constipation      For Asthma  - We monitored your SaO2 closely   - We started you on Albuterol 90 mcg 2 puffs Q6h PRN     Ms. Linton is a 55 year old female patient (active smoker) with asthma and family history of CAD in mother (age 59y) who presented on 09/15 following an acute onset of severe retrosternal chest pain radiating to scapula, found to have mod-severe proximal LAD stenosis on CT heart with coronaries on 09/16 s/p left sided heart catheterization on 09/16 revealing 70% mid LAD stenosis with iFR  0.87 s/p PCI with PRAVEEN, admitted for monitoring. Please refer to the below detailed overview of hospital course:      For Significant Vessel CAD (LAD)  * Troponin < 0.01 (09/15 at 12:00 PM) -> Troponin < 0.01 (09/15 at 17:00 PM)  * ECG x2 (09/15): NSR  * CT coronaries (09/16): 70% mid LAD stenosis with iFR 0.87   * s/p Left sided heart catheterization by Dr. Yeboah on 09/16: s/p PCI with PRAVEEN  * Complicated by Hematoma -> now resolved after hand elevation. Duplex performed on 09/17 ruled out hematoma     - On discharge:  --> Please continue Atorvastatin 80mg QD. Lipid profile on 09/17: , Cholesterol 185, HDL 37. Repeat in 6 months.  --> Please continue Aspirin 81mg QD and Plavix 75mg QD  --> Will not discharge on beta blockers since HR was on low side  --> Follow up with Dr. Yeboah in 2 weeks post discharge      For History of Lumbar Dissectomy  - We monitored pain and started Percocet at 10mg Q4h PRN on 09/17  - On discharge:  --> Will resume home dose  --> Will add colase and senna 2 tablets at bedtime to prevent constipation      For Asthma  - We monitored your SaO2 closely   - We started you on Albuterol 90 mcg 2 puffs Q6h PRN    Attending Attestation:   Pt seen and examined. Case and Plan discussed at bedside and agree with d/c summary as reviewed.  Pt understands his care plan at home  f/u CV as discussed  f/u PMD as well  Pt understands how to take meds and compliance re-inforced.     Meds: Per medrec     Cleared by CV for d/c

## 2020-09-18 NOTE — PROGRESS NOTE ADULT - SUBJECTIVE AND OBJECTIVE BOX
Cardiology Follow up    KVNG THOMAS   55y Female  PAST MEDICAL & SURGICAL HISTORY:  Asthma  no flare yrs no meds    Carpal tunnel syndrome of left wrist  s/p rt ctr 5/17    S/P lumbar discectomy    H/O arthroscopy of shoulder  rt 2017    HPI:  55 year old female patient presented for chest pain.    Known with a history of asthma, smoker (not daily).  Family history of premature CAD in mother (~age 59)      Current history goes back to 2 days prp when the patient experienced acute onset retrosternal / left chest pain that woke her up from sleep, radiating toward L scapular/back, constant since then without resolution. Worse with deep breaths and leaning forward,  not reproducible on palpation.      Pt states she exercises frequently,  no symptoms with exercise.    No recent fevers / illnesses / gi symptoms, but states she started having a cough while in ED today.   Admitted to the ER, troponins -ve, CCTA showing noncalcified mod-sev lesion in prox LAD.      (16 Sep 2020 18:03)    Allergies    No Known Allergies    Intolerances    Patient seen and examined at bedside. No acute events overnight.  Patient without complaints. Pt ambulated without issues/symptoms  Denies CP, SOB, palpitations, or dizziness  No events on telemetry overnight    Vital Signs Last 24 Hrs  T(C): 36.5 (18 Sep 2020 08:00), Max: 36.7 (17 Sep 2020 12:00)  T(F): 97.7 (18 Sep 2020 08:00), Max: 98 (17 Sep 2020 12:00)  HR: 88 (18 Sep 2020 10:00) (56 - 100)  BP: 104/59 (18 Sep 2020 10:00) (82/51 - 110/67)  BP(mean): 69 (18 Sep 2020 10:00) (60 - 81)  RR: 20 (18 Sep 2020 10:00) (14 - 24)  SpO2: 100% (18 Sep 2020 10:00) (99% - 100%)    MEDICATIONS  (STANDING):  aspirin enteric coated 81 milliGRAM(s) Oral daily  atorvastatin 80 milliGRAM(s) Oral at bedtime  chlorhexidine 4% Liquid 1 Application(s) Topical <User Schedule>  clopidogrel Tablet 75 milliGRAM(s) Oral daily  enoxaparin Injectable 40 milliGRAM(s) SubCutaneous at bedtime  influenza   Vaccine 0.5 milliLiter(s) IntraMuscular once  naloxegol 25 milliGRAM(s) Oral daily  pantoprazole    Tablet 40 milliGRAM(s) Oral before breakfast  senna 2 Tablet(s) Oral at bedtime  sodium chloride 0.9%. 1000 milliLiter(s) (100 mL/Hr) IV Continuous <Continuous>    MEDICATIONS  (PRN):  ALBUTerol    90 MICROgram(s) HFA Inhaler 2 Puff(s) Inhalation every 6 hours PRN Bronchospasm  oxycodone    5 mG/acetaminophen 325 mG 2 Tablet(s) Oral every 4 hours PRN Severe Pain (7 - 10)    REVIEW OF SYSTEMS:          All negative except as mentioned in HPI    PHYSICAL EXAM:           CONSTITUTIONAL: Well-developed; well-nourished; in no acute distress  	SKIN: warm, dry  	HEAD: Normocephalic; atraumatic  	EYES: PERRL.  	ENT: No nasal discharge, airway clear, mucous membranes moist  	NECK: Supple; non tender.  	CARD: +S1, +S2, no murmurs, gallops, or rubs. Regular rate and rhythm    	RESP: No wheezes, rales or rhonchi. CTA B/L  	ABD: soft ntnd, + BS x 4 quadrants  	EXT: moves all extremities,  no clubbing, cyanosis or edema  	NEURO: Alert and oriented x3, no focal deficits          PSYCH: Cooperative, appropriate          VASCULAR:  + 2 Rad / + 2 PTs / + 2 DPs          Right Radial: access site and upper FA less swollen then yesterday, soft, + pulses, no sign of infection, no numbness, + capillary refill            ECG:   < from: 12 Lead ECG (09.17.20 @ 06:17) >  Ventricular Rate 62 BPM    Atrial Rate 62 BPM    P-R Interval 132 ms    QRS Duration 82 ms    Q-T Interval 416 ms    QTC Calculation(Bazett) 422 ms    P Axis 70 degrees    R Axis 70 degrees    T Axis 63 degrees    Diagnosis Line Normal sinus rhythm  Normal ECG    Confirmed by Ace Lopez ([id number]) on 9/17/2020 8:21:03 AM                                                                                 2D ECHO:  < from: TTE Echo Complete w/o Contrast w/ Doppler (09.17.20 @ 09:34) >  Summary:   1. LV Ejection Fraction by Johnson's Method with a biplane EF of 69 %.   2. Mildly enlarged left atrium.   3. Normal right atrial size.   4. No evidence of mitral valve regurgitation.   5. Mild-moderate tricuspid regurgitation.    < from: VA Duplex Upper Extrem Arterial Limited, Right (09.17.20 @ 10:06) >  EXAM:  US DPLX UPR EXT ARTS LTD RT            PROCEDURE DATE:  09/17/2020      INTERPRETATION:  Clinical History / Reason for exam: This is a 55-year-old female status post cardiac catheterization via right radial artery. Upper extremity arterial duplex ultrasound was performed to assess for arterial hematoma or pseudoaneurysm.    Right radial artery was visualized and is patent throughout its course. There is no evidence of pseudoaneurysm or overlying hematoma. The right ulnar artery is also patent.    Impression:    No evidence of pseudoaneurysm or overlying hematoma of the right radial artery.    A1C with Estimated Average Glucose in AM (09.17.20 @ 04:24)   A1C with Estimated Average Glucose Result: 5.2    Lipid Profile in AM (09.17.20 @ 04:24)   Total Cholesterol/HDL Ratio Measurement: 5.0 Ratio   Cholesterol, Serum: 185 mg/dL   Triglycerides, Serum: 260 mg/dL   HDL Cholesterol, Serum: 37: HDL Levels >/= 60 mg/dL are considered beneficial and a "negative" risk   factor.   Effective 08/15/2018: New reference range and interpretive comment. mg/dL   Direct LDL: 111: LDL Cholesterol (mg/dL)    LABS:                        12.8   5.34  )-----------( 226      ( 18 Sep 2020 05:09 )             38.3     09-18    140  |  104  |  15  ----------------------------<  96  4.3   |  24  |  0.8    Ca    9.6      18 Sep 2020 05:09  Mg     2.0     09-18    CARDIAC MARKERS ( 17 Sep 2020 04:24 )  x     / x     / 114 U/L / x     / x        Magnesium, Serum: 2.0 mg/dL [1.8 - 2.4] (09-18-20 @ 05:09)      A/P:  I discussed the case with Cardiologist Dr. King and recommend the following:    S/P PCI:   Left main: Normal  LAD: 70% mid LAD stenosis; iFR 0.87; s/p PCI with PRAVEEN  Diag: Mild luminal irregularities  Left Circumflex: Mild luminal irregularities  OM: Mild luminal irregularities  Right Coronary Artery: Mild luminal irregularities    INTERVENTION: PRAVEEN to LAD  IMPLANTS: 3.5 X 16 Synergy RX    POST-OP DIAGNOSIS: significant single vessel disease                            Ambulate around the unit                    NO BB due to Bradycardiac events and Asthma                   Elevate right arm on two pillows while in bed                   Pain management with prn Percocet x 1 week                   Bowel prophylaxis                    Continue DAPT ( Aspirin 81 mg daily and Plavix 75 mg daily ), Statin Therapy                   Patient given 30 day supply of ( Aspirin 81 mg daily and Plavix 75 mg daily ) to take at home                   Patient agreeing to take DAPT for at least one year or as directed by cardiologist                    Pt given instructions on importance of taking antiplatelet medication or risk acute stent thrombosis/death                   Post cath instructions, access site care and activity restrictions reviewed with patient                     Discussed with patient to return to hospital if experience chest pain, shortness breath, dizziness and site bleeding                   Aggressive risk factor modification, diet counseling, smoking cessation discussed with patient                       Can discharge patient from cardiac standpoint after ambulating without symptoms and access site wnl                   Follow up with Cardiology Dr. King in two weeks.  Instructed to call and make an appointment

## 2020-09-18 NOTE — DISCHARGE NOTE PROVIDER - NSDCCPCAREPLAN_GEN_ALL_CORE_FT
PRINCIPAL DISCHARGE DIAGNOSIS  Diagnosis: Chest pain  Assessment and Plan of Treatment: For Significant Vessel CAD (LAD)  * Troponin < 0.01 (09/15 at 12:00 PM) -> Troponin < 0.01 (09/15 at 17:00 PM)  * ECG x2 (09/15): NSR  * CT coronaries (09/16): 70% mid LAD stenosis with iFR 0.87   * s/p Left sided heart catheterization by Dr. Yeboah on 09/16: s/p PCI with PRAVEEN  * Complicated by Hematoma -> now resolved after hand elevation. Duplex performed on 09/17 ruled out hematoma   - On discharge:  --> Please continue Atorvastatin 80mg QD. Lipid profile on 09/17: , Cholesterol 185, HDL 37. Repeat in 6 months.  --> Please continue Aspirin 81mg QD and Plavix 75mg QD  --> Will not discharge on beta blockers since HR was on low side      SECONDARY DISCHARGE DIAGNOSES  Diagnosis: Previous back surgery  Assessment and Plan of Treatment: For History of Lumbar Dissectomy  - We monitored pain and started Percocet at 10mg Q4h PRN on 09/17  - On discharge:  --> Will resume home dose  --> Will add colase and senna 2 tablets at bedtime to prevent constipation    Diagnosis: Asthma  Assessment and Plan of Treatment:   For Asthma  - We monitored your SaO2 closely   - We started you on Albuterol 90 mcg 2 puffs Q6h PRN

## 2020-09-19 RX ORDER — PANTOPRAZOLE SODIUM 20 MG/1
1 TABLET, DELAYED RELEASE ORAL
Qty: 30 | Refills: 0
Start: 2020-09-19 | End: 2020-10-18

## 2020-09-19 RX ORDER — DOCUSATE SODIUM 100 MG
1 CAPSULE ORAL
Qty: 30 | Refills: 0
Start: 2020-09-19 | End: 2020-10-18

## 2020-09-19 RX ORDER — ATORVASTATIN CALCIUM 80 MG/1
1 TABLET, FILM COATED ORAL
Qty: 30 | Refills: 0
Start: 2020-09-19 | End: 2020-10-18

## 2020-09-19 RX ORDER — CLOPIDOGREL BISULFATE 75 MG/1
1 TABLET, FILM COATED ORAL
Qty: 30 | Refills: 0
Start: 2020-09-19 | End: 2020-10-18

## 2020-09-19 RX ORDER — ASPIRIN/CALCIUM CARB/MAGNESIUM 324 MG
1 TABLET ORAL
Qty: 30 | Refills: 0
Start: 2020-09-19 | End: 2020-10-18

## 2020-09-19 RX ORDER — TAMSULOSIN HYDROCHLORIDE 0.4 MG/1
1 CAPSULE ORAL
Qty: 7 | Refills: 0
Start: 2020-09-19 | End: 2020-09-25

## 2020-09-22 DIAGNOSIS — G89.29 OTHER CHRONIC PAIN: ICD-10-CM

## 2020-09-22 DIAGNOSIS — I25.110 ATHEROSCLEROTIC HEART DISEASE OF NATIVE CORONARY ARTERY WITH UNSTABLE ANGINA PECTORIS: ICD-10-CM

## 2020-09-22 DIAGNOSIS — F17.210 NICOTINE DEPENDENCE, CIGARETTES, UNCOMPLICATED: ICD-10-CM
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
## 2020-09-22 DIAGNOSIS — Z98.890 OTHER SPECIFIED POSTPROCEDURAL STATES: ICD-10-CM

## 2020-09-22 DIAGNOSIS — D62 ACUTE POSTHEMORRHAGIC ANEMIA: ICD-10-CM

## 2020-09-22 DIAGNOSIS — Z79.891 LONG TERM (CURRENT) USE OF OPIATE ANALGESIC: ICD-10-CM

## 2020-09-22 DIAGNOSIS — Z82.49 FAMILY HISTORY OF ISCHEMIC HEART DISEASE AND OTHER DISEASES OF THE CIRCULATORY SYSTEM: ICD-10-CM

## 2020-09-22 DIAGNOSIS — J45.909 UNSPECIFIED ASTHMA, UNCOMPLICATED: ICD-10-CM
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
## 2020-09-22 DIAGNOSIS — R00.1 BRADYCARDIA, UNSPECIFIED: ICD-10-CM

## 2020-09-22 DIAGNOSIS — L76.32 POSTPROCEDURAL HEMATOMA OF SKIN AND SUBCUTANEOUS TISSUE FOLLOWING OTHER PROCEDURE: ICD-10-CM

## 2020-09-26 LAB
AMPHET UR-MCNC: NEGATIVE — SIGNIFICANT CHANGE UP
BARBITURATES, URINE.: NEGATIVE — SIGNIFICANT CHANGE UP
BENZODIAZ UR-MCNC: NEGATIVE — SIGNIFICANT CHANGE UP
COCAINE METAB.OTHER UR-MCNC: NEGATIVE — SIGNIFICANT CHANGE UP
CREATININE, URINE THERAPEUTIC: 21.5 MG/DL — SIGNIFICANT CHANGE UP
METHADONE UR-MCNC: NEGATIVE — SIGNIFICANT CHANGE UP
METHAQUALONE UR QL: NEGATIVE — SIGNIFICANT CHANGE UP
METHAQUALONE UR-MCNC: NEGATIVE — SIGNIFICANT CHANGE UP
OPIATES UR-MCNC: SIGNIFICANT CHANGE UP
PCP UR-MCNC: NEGATIVE — SIGNIFICANT CHANGE UP
PROPOXYPH UR QL: NEGATIVE — SIGNIFICANT CHANGE UP
THC UR QL: NEGATIVE — SIGNIFICANT CHANGE UP

## 2020-10-02 ENCOUNTER — APPOINTMENT (OUTPATIENT)
Dept: CARDIOLOGY | Facility: CLINIC | Age: 55
End: 2020-10-02
Payer: MEDICARE

## 2020-10-02 VITALS
WEIGHT: 130 LBS | SYSTOLIC BLOOD PRESSURE: 110 MMHG | HEIGHT: 61 IN | HEART RATE: 75 BPM | DIASTOLIC BLOOD PRESSURE: 70 MMHG | TEMPERATURE: 97.2 F | BODY MASS INDEX: 24.55 KG/M2

## 2020-10-02 PROCEDURE — 93000 ELECTROCARDIOGRAM COMPLETE: CPT

## 2020-10-02 PROCEDURE — 99214 OFFICE O/P EST MOD 30 MIN: CPT

## 2020-10-02 RX ORDER — PANTOPRAZOLE 40 MG/1
40 TABLET, DELAYED RELEASE ORAL
Refills: 0 | Status: DISCONTINUED | COMMUNITY
End: 2020-10-02

## 2020-10-02 RX ORDER — CLOPIDOGREL BISULFATE 75 MG/1
75 TABLET, FILM COATED ORAL DAILY
Qty: 90 | Refills: 3 | Status: DISCONTINUED | COMMUNITY
Start: 2020-10-02 | End: 2020-10-02

## 2020-10-02 NOTE — REVIEW OF SYSTEMS
[see HPI] : see HPI [Joint Pain] : joint pain [Easy Bruising] : a tendency for easy bruising [Negative] : Endocrine

## 2020-10-02 NOTE — PHYSICAL EXAM
[General Appearance - Well Developed] : well developed [Normal Appearance] : normal appearance [Well Groomed] : well groomed [General Appearance - Well Nourished] : well nourished [No Deformities] : no deformities [General Appearance - In No Acute Distress] : no acute distress [Normal Oral Mucosa] : normal oral mucosa [No Oral Pallor] : no oral pallor [No Oral Cyanosis] : no oral cyanosis [Normal Jugular Venous A Waves Present] : normal jugular venous A waves present [Normal Jugular Venous V Waves Present] : normal jugular venous V waves present [No Jugular Venous Lyons A Waves] : no jugular venous lyons A waves [Respiration, Rhythm And Depth] : normal respiratory rhythm and effort [Exaggerated Use Of Accessory Muscles For Inspiration] : no accessory muscle use [Auscultation Breath Sounds / Voice Sounds] : lungs were clear to auscultation bilaterally [Heart Rate And Rhythm] : heart rate and rhythm were normal [Heart Sounds] : normal S1 and S2 [Murmurs] : no murmurs present [Abdomen Soft] : soft [Abdomen Tenderness] : non-tender [Abdomen Mass (___ Cm)] : no abdominal mass palpated [Nail Clubbing] : no clubbing of the fingernails [Cyanosis, Localized] : no localized cyanosis [Petechial Hemorrhages (___cm)] : no petechial hemorrhages [] : no ischemic changes [Skin Color & Pigmentation] : normal skin color and pigmentation [Oriented To Time, Place, And Person] : oriented to person, place, and time

## 2020-10-05 NOTE — REASON FOR VISIT
[Follow-Up - From Hospitalization] : follow-up of a recent hospitalization for [FreeTextEntry2] : cad/pci

## 2020-10-05 NOTE — HISTORY OF PRESENT ILLNESS
[FreeTextEntry1] : 56 yo female with pmhx as below is here for a f/up visit\par 9/20 admitted to [facility] with cp, s/p cath/iFR guided pci of LAD with johanna\par post procedure course sign for right UE hematoma\par US - no sign pathology, pt was started on med rx for cad and sent home with card f/up\par post d/c course sing for residual right UE discomfort, swelling subsided\par no active cv complains\par + delong with mod effort\par et is stable\par ros is otherwise as below

## 2020-10-05 NOTE — ASSESSMENT
[FreeTextEntry1] : 56 yo female with pmhx and presentation as above\par cad/pci of lad with johanna\par dyslipidemia\par asthma\par Right wrist hematoma - resolving\par cont all meds\par Taper to d/c pain meds\par dapt and statin refilled\par pulm referral for asthma mx\par repeat labs\par aggressive risk modif\par diet and act as tolerated\par rtc 6-8 weeks

## 2020-12-04 ENCOUNTER — APPOINTMENT (OUTPATIENT)
Dept: CARDIOLOGY | Facility: CLINIC | Age: 55
End: 2020-12-04
Payer: MEDICARE

## 2020-12-04 VITALS
HEIGHT: 61 IN | SYSTOLIC BLOOD PRESSURE: 110 MMHG | TEMPERATURE: 97.3 F | WEIGHT: 131 LBS | BODY MASS INDEX: 24.73 KG/M2 | DIASTOLIC BLOOD PRESSURE: 70 MMHG

## 2020-12-04 PROCEDURE — 99072 ADDL SUPL MATRL&STAF TM PHE: CPT

## 2020-12-04 PROCEDURE — 99214 OFFICE O/P EST MOD 30 MIN: CPT | Mod: 25

## 2020-12-04 PROCEDURE — 93015 CV STRESS TEST SUPVJ I&R: CPT

## 2020-12-04 NOTE — ASSESSMENT
[FreeTextEntry1] : 54 yo female with pmhx and presentation as above\par cad/pci of lad with johanna\par dyslipidemia\par asthma\par cont all meds\par labs reviewed, all at goal\par dapt and statin to be cont.\par pulm referral for asthma mx\par est - low risk at high workload\par aggressive risk modif\par diet and act as tolerated\par rtc 3 months

## 2020-12-04 NOTE — HISTORY OF PRESENT ILLNESS
[FreeTextEntry1] : 54 yo female with pmhx as below is here for a f/up visit\par 9/20 admitted to [facility] with cp, s/p cath/iFR guided pci of LAD with johanna\par doing well \par no active cv complains\par et is stable\par s/p stress test\par ros is otherwise as below

## 2020-12-05 LAB
ALBUMIN SERPL ELPH-MCNC: 4.6 G/DL
ALP BLD-CCNC: 67 U/L
ALT SERPL-CCNC: 21 U/L
ANION GAP SERPL CALC-SCNC: 12 MMOL/L
AST SERPL-CCNC: 20 U/L
BASOPHILS # BLD AUTO: 0.05 K/UL
BASOPHILS NFR BLD AUTO: 0.9 %
BILIRUB SERPL-MCNC: 0.7 MG/DL
BUN SERPL-MCNC: 18 MG/DL
CALCIUM SERPL-MCNC: 9.6 MG/DL
CHLORIDE SERPL-SCNC: 105 MMOL/L
CHOLEST SERPL-MCNC: 136 MG/DL
CK SERPL-CCNC: 212 U/L
CO2 SERPL-SCNC: 23 MMOL/L
CREAT SERPL-MCNC: 0.9 MG/DL
EOSINOPHIL # BLD AUTO: 0.19 K/UL
EOSINOPHIL NFR BLD AUTO: 3.5 %
GLUCOSE SERPL-MCNC: 92 MG/DL
HCT VFR BLD CALC: 39.3 %
HDLC SERPL-MCNC: 51 MG/DL
HGB BLD-MCNC: 12.8 G/DL
IMM GRANULOCYTES NFR BLD AUTO: 0.2 %
LDLC SERPL CALC-MCNC: 72 MG/DL
LYMPHOCYTES # BLD AUTO: 1.74 K/UL
LYMPHOCYTES NFR BLD AUTO: 32 %
MAN DIFF?: NORMAL
MCHC RBC-ENTMCNC: 29.5 PG
MCHC RBC-ENTMCNC: 32.6 G/DL
MCV RBC AUTO: 90.6 FL
MONOCYTES # BLD AUTO: 0.38 K/UL
MONOCYTES NFR BLD AUTO: 7 %
NEUTROPHILS # BLD AUTO: 3.06 K/UL
NEUTROPHILS NFR BLD AUTO: 56.4 %
NONHDLC SERPL-MCNC: 85 MG/DL
PLATELET # BLD AUTO: 219 K/UL
POTASSIUM SERPL-SCNC: 4.2 MMOL/L
PROT SERPL-MCNC: 7.2 G/DL
RBC # BLD: 4.34 M/UL
RBC # FLD: 12.5 %
SODIUM SERPL-SCNC: 140 MMOL/L
TRIGL SERPL-MCNC: 104 MG/DL
WBC # FLD AUTO: 5.43 K/UL

## 2020-12-23 NOTE — ED PROVIDER NOTE - CHILD ABUSE FACILITY
[facility]
Implemented All Fall with Harm Risk Interventions:  [place] to call system. Call bell, personal items and telephone within reach. Instruct patient to call for assistance. Room bathroom lighting operational. Non-slip footwear when patient is off stretcher. Physically safe environment: no spills, clutter or unnecessary equipment. Stretcher in lowest position, wheels locked, appropriate side rails in place. Provide visual cue, wrist band, yellow gown, etc. Monitor gait and stability. Monitor for mental status changes and reorient to person, place, and time. Review medications for side effects contributing to fall risk. Reinforce activity limits and safety measures with patient and family. Provide visual clues: red socks.

## 2021-03-25 NOTE — ASU PATIENT PROFILE, ADULT - MEDICATIONS BROUGHT TO HOSPITAL, PROFILE
Chief Complaint   Patient presents with   • Body Aches     fatigue       HISTORY OF PRESENT ILLNESS: Patient is a 65 y.o. male who presents today because he received his maternal Covid vaccination yesterday and did fine up until a few hours later when he started to develop joint aches and body aches and fatigue.  He denies any fevers, chills, nausea, vomiting or diarrhea.  He took some Tylenol and ibuprofen which helped little bit but he is still sore today and needs a note for work.    Patient Active Problem List    Diagnosis Date Noted   • Abnormal LFTs (liver function tests) 09/14/2020   • Trigger middle finger of right hand 09/14/2020   • Thrombocytopenia (HCC) 02/24/2020   • Digital mucinous cyst 01/20/2020   • Short-term memory loss 01/20/2020   • Atypical pigmented skin lesion 07/30/2019   • Elevated PSA, less than 10 ng/ml 02/25/2019   • Rib pain on left side 02/16/2018   • Obesity (BMI 30-39.9) 12/11/2017   • Pulmonary nodules 11/01/2016   • Abnormal chest x-ray with multiple lung nodules 10/10/2016   • Hair loss 09/29/2016   • Neck pain 05/04/2016   • Right elbow tendinitis 02/29/2016   • Nonintractable migraine 08/31/2015   • Peripheral neuropathy 07/09/2015   • Visual disturbances 01/30/2015   • Edema extremities 08/22/2014   • Seborrheic keratoses, inflamed 08/22/2014   • Right ankle swelling 04/02/2014   • Seizure ([facility]) 2013   • Vitamin D deficiency 03/08/2010   • Mild tobacco abuse in early remission 11/06/2009   • Essential hypertension, benign    • Osteoarthritis of multiple joints    • Contact dermatitis and other eczema, due to unspecified cause    • Esophageal reflux    • Carpal tunnel syndrome    • Prediabetes    • Loss of height    • Mixed hyperlipidemia 06/05/2009       Allergies:Erythromycin, Latex, and Other environmental    Current Outpatient Medications Ordered in Epic   Medication Sig Dispense Refill   • ibuprofen (MOTRIN) 800 MG Tab Take 1 tablet by mouth every 8 hours as needed. 30  tablet 0   • traMADol (ULTRAM) 50 MG Tab tramadol 50 mg tablet   TAKE 1 TO 2 ORAL TABLETS EVERY 8 HOURS AS NEEDED FOR MODERATE PAIN FOR 14 DAYS**L89.18     • ondansetron (ZOFRAN ODT) 4 MG TABLET DISPERSIBLE ondansetron 4 mg disintegrating tablet   DISSOLVE ONE ORAL TABLET ON TONGUE EVERY 8 HOURS AS NEEDED FOR NAUSEA/VOMITING     • meloxicam (MOBIC) 15 MG tablet meloxicam 15 mg tablet   TAKE ONE ORAL TABLET ONCE DAILY     • HYDROcodone-acetaminophen (NORCO) 7.5-325 MG per tablet hydrocodone 7.5 mg-acetaminophen 325 mg tablet   TAKE ONE ORAL TABLET EVERY 4 TO 6 HOURS AS NEEDED FOR SEVERE PAIN FOR 14 DAYS**L89.18     • CHANTIX CONTINUING MONTH JERI 1 MG tablet TAKE 1 TABLET BY MOUTH TWICE A DAY 56 Tab 3   • atorvastatin (LIPITOR) 10 MG Tab Take 1 Tab by mouth every day. 100 Tab 3   • ketoconazole (NIZORAL) 2 % shampoo ketoconazole 2 % shampoo   WASH SCALP EEVERY DAY UP TO 5 DAYS A WEEK AS NEEDED. ALLOW TO SIT FOR 5 MINUTES PRIOR RINSING     • triamcinolone acetonide (KENALOG) 0.1 % Cream as needed.     • diclofenac CR (VOLTAREN-XR) 100 MG TABLET SR 24 HR tablet Take 1 Tab by mouth every day. 90 Tab 3   • gabapentin (NEURONTIN) 100 MG Cap Take 1 Cap by mouth every bedtime. 90 Cap 2   • BACTROBAN 2 % Ointment Apply 1 Application to affected area(s) 2 times a day. (Patient taking differently: Apply 1 Application topically as needed.) 60 g 3   • omeprazole (PRILOSEC) 20 MG delayed-release capsule Take 1 Cap by mouth every day. 90 Cap 3   • divalproex ER (DEPAKOTE ER) 500 MG TABLET SR 24 HR TAKE 1 TABLET BY MOUTH TWICE A  Tab 3   • Cholecalciferol ([employer] VITAMIN D) 2000 UNIT Cap Take 1 Cap by mouth every bedtime. For vitamin D deficiency 100 Cap 3   • tamsulosin (FLOMAX) 0.4 MG capsule Take 0.4 mg by mouth ONE-HALF HOUR AFTER BREAKFAST.       No current Epic-ordered facility-administered medications on file.       Past Medical History:   Diagnosis Date   • Abnormal LFTs (liver function tests) 9/14/2020   • Anesthesia      occasionally wakes up agitated/ anxious   • Arthritis    • Atypical pigmented skin lesion 2019   • Carpal tunnel syndrome     bilateral, uncontrolled   • Cholesterol blood decreased    • Contact dermatitis and other eczema, due to unspecified cause     Chronic, controlled with meds   • Edema extremities 2014   • Elevated PSA, less than 10 ng/ml 2019   • Epididymitis     chronic on left   • Esophageal reflux    • Heart burn    • Loss of height     Loss of 1.5 inches ,   • Mild tobacco abuse in early remission 2009    Quit , then restarted    • Mixed hyperlipidemia    • Osteoarthrosis involving, or with mention of more than one site, but not specified as generalized, multiple sites    • Pain 9/3/14    left knee   • Prediabetes     uncontrolled   • Seborrheic keratoses, inflamed 2014   • Seizure ([facility])     seizure is visual changes only, none since medication started in    • Short-term memory loss 2020   • Snoring    • Thrombocytopenia ([facility]) 2020   • Tinnitus     chronic with hearing loss   • Unspecified vitamin D deficiency 3/8/2010   • Visual disturbances 2015       Social History     Tobacco Use   • Smoking status: Former Smoker     Packs/day: 1.00     Years: 43.00     Pack years: 43.00     Types: Cigarettes     Quit date: 2020     Years since quittin.5   • Smokeless tobacco: Never Used   Substance Use Topics   • Alcohol use: Not Currently     Alcohol/week: 0.0 oz     Comment: rare   • Drug use: No       Family Status   Relation Name Status   • Mo          healthy in her 70's   • Fa  Other        history unknown   • Koki  Alive   • OTHER  (Not Specified)     Family History   Problem Relation Age of Onset   • Cancer Mother    • Lung Cancer Mother    • Breast Cancer Daughter    • Lung Disease Other        ROS:  Review of Systems   Constitutional: Negative for fever, chills, weight loss and positive for myalgias and malaise/fatigue.   HENT:  "Negative for ear pain, nosebleeds, congestion, sore throat and neck pain.    Eyes: Negative for blurred vision.   Respiratory: Negative for cough, sputum production, shortness of breath and wheezing.    Cardiovascular: Negative for chest pain, palpitations, orthopnea and leg swelling.   Gastrointestinal: Negative for heartburn, nausea, vomiting and abdominal pain.   Genitourinary: Negative for dysuria, urgency and frequency.     Exam:  /88 (BP Location: Right arm, Patient Position: Sitting, BP Cuff Size: Large adult)   Pulse 64   Temp 37.3 °C (99.2 °F) (Temporal)   Resp 16   Ht 1.753 m (5' 9\")   Wt 107 kg (236 lb)   SpO2 96%   General:  Well nourished, well developed male in NAD  Head:Normocephalic, atraumatic  Eyes: PERRLA, EOM within normal limits, no conjunctival injection, no scleral icterus, visual fields and acuity grossly intact.  Nose: Symmetrical without tenderness, no discharge.  Mouth: reasonable hygiene, no erythema exudates or tonsillar enlargement.  Neck: no masses, range of motion within normal limits, no tracheal deviation. No obvious thyroid enlargement.  Extremities: no clubbing, cyanosis, or edema.    Please note that this dictation was created using voice recognition software. I have made every reasonable attempt to correct obvious errors, but I expect that there are errors of grammar and possibly content that I did not discover before finalizing the note.    Assessment/Plan:  1. Myalgia  ibuprofen (MOTRIN) 800 MG Tab   Vaccination status without any erythema, induration, warmth.    Followup with primary care in the next 7-10 days if not significantly improving, return to the urgent care or go to the emergency room sooner for any worsening of symptoms.       " no

## 2021-03-30 ENCOUNTER — EMERGENCY (EMERGENCY)
Facility: HOSPITAL | Age: 56
LOS: 0 days | Discharge: HOME | End: 2021-03-31
Attending: EMERGENCY MEDICINE | Admitting: EMERGENCY MEDICINE
Payer: MEDICARE

## 2021-03-30 VITALS
WEIGHT: 128.09 LBS | HEIGHT: 61 IN | RESPIRATION RATE: 20 BRPM | SYSTOLIC BLOOD PRESSURE: 162 MMHG | HEART RATE: 98 BPM | TEMPERATURE: 99 F | DIASTOLIC BLOOD PRESSURE: 66 MMHG | OXYGEN SATURATION: 100 %

## 2021-03-30 DIAGNOSIS — Z98.890 OTHER SPECIFIED POSTPROCEDURAL STATES: Chronic | ICD-10-CM

## 2021-03-30 DIAGNOSIS — Z20.822 CONTACT WITH AND (SUSPECTED) EXPOSURE TO COVID-19: ICD-10-CM

## 2021-03-30 DIAGNOSIS — R07.89 OTHER CHEST PAIN: ICD-10-CM

## 2021-03-30 LAB
ALBUMIN SERPL ELPH-MCNC: 4.4 G/DL — SIGNIFICANT CHANGE UP (ref 3.5–5.2)
ALP SERPL-CCNC: 58 U/L — SIGNIFICANT CHANGE UP (ref 30–115)
ALT FLD-CCNC: 16 U/L — SIGNIFICANT CHANGE UP (ref 0–41)
ANION GAP SERPL CALC-SCNC: 10 MMOL/L — SIGNIFICANT CHANGE UP (ref 7–14)
AST SERPL-CCNC: 20 U/L — SIGNIFICANT CHANGE UP (ref 0–41)
BASOPHILS # BLD AUTO: 0.05 K/UL — SIGNIFICANT CHANGE UP (ref 0–0.2)
BASOPHILS NFR BLD AUTO: 0.9 % — SIGNIFICANT CHANGE UP (ref 0–1)
BILIRUB SERPL-MCNC: 0.4 MG/DL — SIGNIFICANT CHANGE UP (ref 0.2–1.2)
BUN SERPL-MCNC: 13 MG/DL — SIGNIFICANT CHANGE UP (ref 10–20)
CALCIUM SERPL-MCNC: 9.2 MG/DL — SIGNIFICANT CHANGE UP (ref 8.5–10.1)
CHLORIDE SERPL-SCNC: 105 MMOL/L — SIGNIFICANT CHANGE UP (ref 98–110)
CO2 SERPL-SCNC: 25 MMOL/L — SIGNIFICANT CHANGE UP (ref 17–32)
CREAT SERPL-MCNC: 0.8 MG/DL — SIGNIFICANT CHANGE UP (ref 0.7–1.5)
D DIMER BLD IA.RAPID-MCNC: 39 NG/ML DDU — SIGNIFICANT CHANGE UP (ref 0–230)
EOSINOPHIL # BLD AUTO: 0.13 K/UL — SIGNIFICANT CHANGE UP (ref 0–0.7)
EOSINOPHIL NFR BLD AUTO: 2.4 % — SIGNIFICANT CHANGE UP (ref 0–8)
GLUCOSE SERPL-MCNC: 99 MG/DL — SIGNIFICANT CHANGE UP (ref 70–99)
HCG SERPL QL: NEGATIVE — SIGNIFICANT CHANGE UP
HCT VFR BLD CALC: 36.8 % — LOW (ref 37–47)
HGB BLD-MCNC: 12.3 G/DL — SIGNIFICANT CHANGE UP (ref 12–16)
IMM GRANULOCYTES NFR BLD AUTO: 0.2 % — SIGNIFICANT CHANGE UP (ref 0.1–0.3)
LYMPHOCYTES # BLD AUTO: 1.74 K/UL — SIGNIFICANT CHANGE UP (ref 1.2–3.4)
LYMPHOCYTES # BLD AUTO: 32.8 % — SIGNIFICANT CHANGE UP (ref 20.5–51.1)
MAGNESIUM SERPL-MCNC: 2 MG/DL — SIGNIFICANT CHANGE UP (ref 1.8–2.4)
MCHC RBC-ENTMCNC: 29.9 PG — SIGNIFICANT CHANGE UP (ref 27–31)
MCHC RBC-ENTMCNC: 33.4 G/DL — SIGNIFICANT CHANGE UP (ref 32–37)
MCV RBC AUTO: 89.5 FL — SIGNIFICANT CHANGE UP (ref 81–99)
MONOCYTES # BLD AUTO: 0.33 K/UL — SIGNIFICANT CHANGE UP (ref 0.1–0.6)
MONOCYTES NFR BLD AUTO: 6.2 % — SIGNIFICANT CHANGE UP (ref 1.7–9.3)
NEUTROPHILS # BLD AUTO: 3.05 K/UL — SIGNIFICANT CHANGE UP (ref 1.4–6.5)
NEUTROPHILS NFR BLD AUTO: 57.5 % — SIGNIFICANT CHANGE UP (ref 42.2–75.2)
NRBC # BLD: 0 /100 WBCS — SIGNIFICANT CHANGE UP (ref 0–0)
NT-PROBNP SERPL-SCNC: 64 PG/ML — SIGNIFICANT CHANGE UP (ref 0–300)
PLATELET # BLD AUTO: 208 K/UL — SIGNIFICANT CHANGE UP (ref 130–400)
POTASSIUM SERPL-MCNC: 3.6 MMOL/L — SIGNIFICANT CHANGE UP (ref 3.5–5)
POTASSIUM SERPL-SCNC: 3.6 MMOL/L — SIGNIFICANT CHANGE UP (ref 3.5–5)
PROT SERPL-MCNC: 6.8 G/DL — SIGNIFICANT CHANGE UP (ref 6–8)
RBC # BLD: 4.11 M/UL — LOW (ref 4.2–5.4)
RBC # FLD: 12.7 % — SIGNIFICANT CHANGE UP (ref 11.5–14.5)
SODIUM SERPL-SCNC: 140 MMOL/L — SIGNIFICANT CHANGE UP (ref 135–146)
TROPONIN T SERPL-MCNC: <0.01 NG/ML — SIGNIFICANT CHANGE UP
TROPONIN T SERPL-MCNC: <0.01 NG/ML — SIGNIFICANT CHANGE UP
WBC # BLD: 5.31 K/UL — SIGNIFICANT CHANGE UP (ref 4.8–10.8)
WBC # FLD AUTO: 5.31 K/UL — SIGNIFICANT CHANGE UP (ref 4.8–10.8)

## 2021-03-30 PROCEDURE — 93010 ELECTROCARDIOGRAM REPORT: CPT

## 2021-03-30 PROCEDURE — 71046 X-RAY EXAM CHEST 2 VIEWS: CPT | Mod: 26

## 2021-03-30 PROCEDURE — 99220: CPT

## 2021-03-30 RX ORDER — ASPIRIN/CALCIUM CARB/MAGNESIUM 324 MG
325 TABLET ORAL ONCE
Refills: 0 | Status: COMPLETED | OUTPATIENT
Start: 2021-03-30 | End: 2021-03-30

## 2021-03-30 RX ORDER — KETOROLAC TROMETHAMINE 30 MG/ML
15 SYRINGE (ML) INJECTION ONCE
Refills: 0 | Status: DISCONTINUED | OUTPATIENT
Start: 2021-03-30 | End: 2021-03-30

## 2021-03-30 RX ADMIN — Medication 325 MILLIGRAM(S): at 19:08

## 2021-03-30 RX ADMIN — Medication 15 MILLIGRAM(S): at 23:09

## 2021-03-30 NOTE — ED PROVIDER NOTE - OBJECTIVE STATEMENT
55 y.o female w/ hx of CAD s/p PCI 9/2020, chronic back pain, HTN, HLD presents to the ED for evaluation of R sided chest pain x 2 days.  Intermittent, pleuritic, mild severity, nonexertional, radiates to back.  Denies diaphoresis, fever, chills, edema of lower extremities, calf pain, hemoptysis, hx of PE/DVT, hormone replacement therapy. 55 y.o female w/ hx of CAD s/p PCI 9/2020, chronic back pain, HTN, HLD presents to the ED for evaluation of R sided chest pain x 2 days.  Intermittent, pleuritic, mild severity, nonexertional, radiates to back.  Denies diaphoresis, fever, chills, edema of lower extremities, calf pain, hemoptysis, hx of PE/DVT, hormone replacement therapy.    cardiology- Dr. King

## 2021-03-30 NOTE — ED CDU PROVIDER INITIAL DAY NOTE - MEDICAL DECISION MAKING DETAILS
Patient to remain on continuous telemetry.  For repeat cardiac enzymes and repeat EKG.  Cardiology consult pending to determine need for further ED testing.

## 2021-03-30 NOTE — ED CDU PROVIDER INITIAL DAY NOTE - OBJECTIVE STATEMENT
56 yo F with PMHx of asthma, HTN, HLD, chronic back pain and CAD s/p PCI 9/2020 presents to the ED c/o mild right sided chest pain that has been persisting for the past 2 days. Pain is sharp, intermittent, worse with inspiration and radiates to her back. Pt denies taking medication to improve her symptoms. She denies other complaints. Pt denies fever, chills, nausea, vomiting, abdominal pain, diarrhea, headache, dizziness, weakness, SOB, LOC, trauma, urinary symptoms, cough, calf pain/swelling, recent travel, recent surgery.

## 2021-03-30 NOTE — ED PROVIDER NOTE - CLINICAL SUMMARY MEDICAL DECISION MAKING FREE TEXT BOX
56yo F with PMHx CAD/stent, HTN, HLD, presents for atypical chest pain for 2 days. Troponin negative. Ddimer negative. CXR WNL. EDOU for continued chest pain workup/ monitoring.

## 2021-03-30 NOTE — ED ADULT NURSE REASSESSMENT NOTE - COMFORT CARE
treatment delay explained/wait time explained/warm blanket provided
plan of care explained/wait time explained

## 2021-03-30 NOTE — ED PROVIDER NOTE - ATTENDING CONTRIBUTION TO CARE
56yo F with PMHx CAD/stent, HTN, HLD, chronic back pain, presents for chest pain for 2 days, right sided, intermittent, mild, sharp, radiates to back, nonexertional, worse with deep inspiration. Denies fever, chills, headache, lightheadedness, SOB, orthopnea, cough, palpitations, nausea, vomiting, diarrhea, abd pain. No leg swelling, hemopytsis, OCP/hormone use, recent prolonged immobilization (including surgery, trauma, bedrest, plane travel), h/o DVT/PE, h/o malignancy. Cardiologist Dr. King.     Vital signs reviewed  GENERAL: Patient nontoxic appearing, NAD  HEAD: NCAT  EYES: Anicteric  ENT: MMM  RESPIRATORY: Normal respiratory effort. CTA B/L. No wheezing, rales, rhonchi  CARDIOVASCULAR: Regular rate and rhythm  ABDOMEN: Soft. Nondistended. Nontender  MUSCULOSKELETAL/EXTREMITIES: Brisk cap refill. Equal radial pulses. No leg edema or calf tenderness  SKIN:  Warm and dry  NEURO: AAOx3. No gross FND.

## 2021-03-31 VITALS — HEART RATE: 72 BPM | DIASTOLIC BLOOD PRESSURE: 59 MMHG | SYSTOLIC BLOOD PRESSURE: 100 MMHG | TEMPERATURE: 98 F

## 2021-03-31 PROCEDURE — 99217: CPT

## 2021-03-31 PROCEDURE — 99284 EMERGENCY DEPT VISIT MOD MDM: CPT

## 2021-03-31 RX ORDER — PANTOPRAZOLE SODIUM 20 MG/1
40 TABLET, DELAYED RELEASE ORAL
Refills: 0 | Status: DISCONTINUED | OUTPATIENT
Start: 2021-03-31 | End: 2021-03-31

## 2021-03-31 RX ORDER — CLOPIDOGREL BISULFATE 75 MG/1
75 TABLET, FILM COATED ORAL DAILY
Refills: 0 | Status: DISCONTINUED | OUTPATIENT
Start: 2021-03-31 | End: 2021-03-31

## 2021-03-31 RX ORDER — ASPIRIN/CALCIUM CARB/MAGNESIUM 324 MG
81 TABLET ORAL DAILY
Refills: 0 | Status: DISCONTINUED | OUTPATIENT
Start: 2021-03-31 | End: 2021-03-31

## 2021-03-31 RX ADMIN — Medication 50 MILLIGRAM(S): at 06:44

## 2021-03-31 RX ADMIN — PANTOPRAZOLE SODIUM 40 MILLIGRAM(S): 20 TABLET, DELAYED RELEASE ORAL at 06:44

## 2021-03-31 NOTE — ED CDU PROVIDER DISPOSITION NOTE - CARE PROVIDER_API CALL
Yosvany King)  Cardiovascular Disease; Internal Medicine; Interventional Cardiology; Nuclear Cardiology  [address] [place] [address], [place] [address]  Phone: [phone]  Fax: [phone]  Follow Up Time: 1-3 Days

## 2021-03-31 NOTE — ED ADULT NURSE REASSESSMENT NOTE - NSIMPLEMENTINTERV_GEN_ALL_ED
Implemented All Universal Safety Interventions:  [place] to call system. Call bell, personal items and telephone within reach. Instruct patient to call for assistance. Room bathroom lighting operational. Non-slip footwear when patient is off stretcher. Physically safe environment: no spills, clutter or unnecessary equipment. Stretcher in lowest position, wheels locked, appropriate side rails in place.

## 2021-03-31 NOTE — ED CDU PROVIDER SUBSEQUENT DAY NOTE - MEDICAL DECISION MAKING DETAILS
Patient with 2 sets negative troponin.  Non ischemic EKG.  No complaints.  Atypical pain, pending cardiology consult.

## 2021-03-31 NOTE — ED ADULT NURSE REASSESSMENT NOTE - GENERAL PATIENT STATE
comfortable appearance/resting/sleeping
comfortable appearance/cooperative
comfortable appearance/cooperative/family/SO at bedside/smiling/interactive

## 2021-03-31 NOTE — CONSULT NOTE ADULT - SUBJECTIVE AND OBJECTIVE BOX
HPI:  56 YO F with PMH of HTN, DLD, asthma, CAD s/p PCI of mid LAD in 9/2020, presented from 2 day history of right sided sharp chest pain, radiating to right shoulder and back. Pt. described the pain as 7/10, sharp, that worsens with inspiration, right shoulder movement and lying down. Denies any fevers, chills.    PAST MEDICAL & SURGICAL HISTORY  Carpal tunnel syndrome of left wrist  s/p rt ctr 5/17    Asthma  no flare yrs no meds    H/O arthroscopy of shoulder  rt 2017    S/P lumbar discectomy        FAMILY HISTORY:  FAMILY HISTORY:  No pertinent family history in first degree relatives        SOCIAL HISTORY:  []smoker  []Alcohol  []Drug    ALLERGIES:  No Known Allergies      MEDICATIONS:  MEDICATIONS  (STANDING):  aspirin  chewable 81 milliGRAM(s) Oral daily  clopidogrel Tablet 75 milliGRAM(s) Oral daily  pantoprazole    Tablet 40 milliGRAM(s) Oral before breakfast  pregabalin 50 milliGRAM(s) Oral two times a day    MEDICATIONS  (PRN):      HOME MEDICATIONS:  Home Medications:  meloxicam 7.5 mg oral tablet: 1 tab(s) orally once a day (06 May 2019 08:38)  Percocet 10/325 oral tablet: 1 tab(s) orally 2 times a day (06 May 2019 08:38)      VITALS:   T(F): 97.7 (03-30 @ 23:38), Max: 98.6 (03-30 @ 13:36)  HR: 78 (03-31 @ 04:15) (70 - 98)  BP: 122/65 (03-31 @ 04:15) (114/63 - 162/66)  BP(mean): --  RR: 18 (03-31 @ 04:15) (18 - 20)  SpO2: 96% (03-31 @ 04:15) (96% - 100%)    I&O's Summary      REVIEW OF SYSTEMS:  CONSTITUTIONAL: No weakness, fevers or chills  EYES/ENT: No visual changes;  No vertigo or throat pain   NECK: No pain or stiffness  RESPIRATORY: No cough, wheezing, hemoptysis; No shortness of breath  CARDIOVASCULAR: No palpitations  GASTROINTESTINAL: No abdominal or epigastric pain. No nausea, vomiting, or hematemesis; No diarrhea or constipation. No melena or hematochezia.  GENITOURINARY: No dysuria, frequency or hematuria  NEUROLOGICAL: No numbness or weakness  SKIN: No itching, no rashes    PHYSICAL EXAM:  NEURO: patient is awake , alert and oriented  GEN: Not in acute distress  NECK: no thyroid enlargement, no JVD  LUNGS: Clear to auscultation bilaterally   CARDIOVASCULAR: S1/S2 present, RRR , no murmurs or rubs, no carotid bruits,  + PP bilaterally  ABD: Soft, non-tender, non-distended, +BS  EXT: No KAM  SKIN: Intact    LABS:                        12.3   5.31  )-----------( 208      ( 30 Mar 2021 15:47 )             36.8     03-30    140  |  105  |  13  ----------------------------<  99  3.6   |  25  |  0.8    Ca    9.2      30 Mar 2021 15:47  Mg     2.0     03-30    TPro  6.8  /  Alb  4.4  /  TBili  0.4  /  DBili  x   /  AST  20  /  ALT  16  /  AlkPhos  58  03-30      Troponin T, Serum: <0.01 ng/mL (03-30-21 @ 20:39)  Troponin T, Serum: <0.01 ng/mL (03-30-21 @ 15:47)    CARDIAC MARKERS ( 30 Mar 2021 20:39 )  x     / <0.01 ng/mL / x     / x     / x      CARDIAC MARKERS ( 30 Mar 2021 15:47 )  x     / <0.01 ng/mL / x     / x     / x            Troponin trend:    Serum Pro-Brain Natriuretic Peptide: 64 pg/mL (03-30-21 @ 15:47)          RADIOLOGY:  -CXR:  -TTE:  -CCTA:  -STRESS TEST:  -CATHETERIZATION: 9/2020  1 vessel disease, s/p PCI and PRAVEEN x 1 of iFR positive mid lesion    ECG:  NSR, no ST-T changes

## 2021-03-31 NOTE — CONSULT NOTE ADULT - ASSESSMENT
56 YO F with PMH of HTN, DLD, asthma, CAD s/p PCI of mid LAD in 9/2020, presented from 2 day history of right sided sharp chest pain, radiating to right shoulder and back.     Impression:    Atypical chest pain / musculoskeletal pain / costochondritis    Recommend:    - Obtain repeat EKG  - Continue with ASA, Plavix, and statin  - Pain control PRN  - Will follow

## 2021-03-31 NOTE — ED CDU PROVIDER DISPOSITION NOTE - CLINICAL COURSE
Patient had no events on telemetry.  Normal cardiac enzymes and non ischemic EKG.  Seen by cardiology.  Cath report from 9/2020 reviewed.  No residual disease. D/C home to f/u with Cardio as scheduled.  Strict return instructions discussed.

## 2021-03-31 NOTE — ED CDU PROVIDER DISPOSITION NOTE - PATIENT PORTAL LINK FT
You can access the FollowMyHealth Patient Portal offered by [facility] by registering at the following website: [email or web address]. By joining [employer]’s FollowMyHealth portal, you will also be able to view your health information using other applications (apps) compatible with our system.

## 2021-03-31 NOTE — ED ADULT NURSE REASSESSMENT NOTE - NS ED NURSE REASSESS COMMENT FT1
IV ite asymptomatic. patient chest rise symmetrical, a+ox4.  steady gait. questions and concerns addressed. patient seen by md this AM. OBS
Pt observed comfortably sleeping, no distress noted. Continue to monitor.
Pt aware of condition, pt admitted to observation unit. Pain meds given as ordered . Will monitor.
Received pt alert, oriented x 3, denies any chest pain or SOB at this time. Current eating dinner with family at bedside. Will monitor.

## 2021-04-23 ENCOUNTER — APPOINTMENT (OUTPATIENT)
Dept: CARDIOLOGY | Facility: CLINIC | Age: 56
End: 2021-04-23
Payer: MEDICARE

## 2021-04-23 VITALS
WEIGHT: 130 LBS | DIASTOLIC BLOOD PRESSURE: 67 MMHG | BODY MASS INDEX: 24.55 KG/M2 | HEART RATE: 87 BPM | SYSTOLIC BLOOD PRESSURE: 110 MMHG | HEIGHT: 61 IN | TEMPERATURE: 98.2 F

## 2021-04-23 PROCEDURE — 99072 ADDL SUPL MATRL&STAF TM PHE: CPT

## 2021-04-23 PROCEDURE — 99214 OFFICE O/P EST MOD 30 MIN: CPT

## 2021-04-23 PROCEDURE — 93000 ELECTROCARDIOGRAM COMPLETE: CPT

## 2021-04-23 RX ORDER — NALOXEGOL OXALATE 25 MG/1
25 TABLET, FILM COATED ORAL
Refills: 0 | Status: DISCONTINUED | COMMUNITY
End: 2021-04-23

## 2021-04-23 NOTE — HISTORY OF PRESENT ILLNESS
[FreeTextEntry1] : 54 yo female with pmhx as below is here for a f/up visit\par 9/20 admitted to [facility] with cp, s/p cath/iFR guided pci of LAD with johanna\par 03/21 visit to ed with cp\par + stress at home\par doing well \par no active cv complains\par et is stable\par ros is otherwise as below

## 2021-04-23 NOTE — PHYSICAL EXAM
[General Appearance - Well Developed] : well developed [Normal Appearance] : normal appearance [Well Groomed] : well groomed [General Appearance - Well Nourished] : well nourished [No Deformities] : no deformities [General Appearance - In No Acute Distress] : no acute distress [Normal Oral Mucosa] : normal oral mucosa [No Oral Pallor] : no oral pallor [No Oral Cyanosis] : no oral cyanosis [Normal Jugular Venous A Waves Present] : normal jugular venous A waves present [Normal Jugular Venous V Waves Present] : normal jugular venous V waves present [No Jugular Venous Lyons A Waves] : no jugular venous lyons A waves [Respiration, Rhythm And Depth] : normal respiratory rhythm and effort [Exaggerated Use Of Accessory Muscles For Inspiration] : no accessory muscle use [Auscultation Breath Sounds / Voice Sounds] : lungs were clear to auscultation bilaterally [Heart Rate And Rhythm] : heart rate and rhythm were normal [Heart Sounds] : normal S1 and S2 [Murmurs] : no murmurs present [Abdomen Soft] : soft [Abdomen Tenderness] : non-tender [Abdomen Mass (___ Cm)] : no abdominal mass palpated [Nail Clubbing] : no clubbing of the fingernails [Petechial Hemorrhages (___cm)] : no petechial hemorrhages [Cyanosis, Localized] : no localized cyanosis [] : no ischemic changes [Skin Color & Pigmentation] : normal skin color and pigmentation [Oriented To Time, Place, And Person] : oriented to person, place, and time

## 2021-07-15 ENCOUNTER — TRANSCRIPTION ENCOUNTER (OUTPATIENT)
Age: 56
End: 2021-07-15

## 2021-07-16 NOTE — DISCHARGE NOTE PROVIDER - NSDCCAREPROVSEEN_GEN_ALL_CORE_FT
Bed: 15  Expected date:   Expected time:   Means of arrival:   Comments:  Pt from 30  
Writing RN called RT Elmore to inform her patient will move to room 15 for negative pressure and then will need hour long breathing treatment per Femi ARAIZA.    
Writing RN gave patient son patient's ED room phone number with Patient permission.  
Hospitalist: Dr. Borrero  Cardiologist: Dr. Yeboah

## 2021-07-22 NOTE — PATIENT PROFILE ADULT - BRADEN MOISTURE
Quality 130: Documentation Of Current Medications In The Medical Record: Current Medications Documented Quality 431: Preventive Care And Screening: Unhealthy Alcohol Use - Screening: Patient screened for unhealthy alcohol use using a single question and scores less than 2 times per year Quality 226: Preventive Care And Screening: Tobacco Use: Screening And Cessation Intervention: Patient screened for tobacco use and is an ex/non-smoker Detail Level: Detailed (4) rarely moist

## 2021-08-27 ENCOUNTER — APPOINTMENT (OUTPATIENT)
Dept: CARDIOLOGY | Facility: CLINIC | Age: 56
End: 2021-08-27

## 2021-08-30 ENCOUNTER — INPATIENT (INPATIENT)
Facility: HOSPITAL | Age: 56
LOS: 0 days | Discharge: HOME HEALTH | End: 2021-08-31
Attending: INTERNAL MEDICINE | Admitting: INTERNAL MEDICINE
Payer: MEDICARE

## 2021-08-30 VITALS
OXYGEN SATURATION: 100 % | HEIGHT: 61 IN | HEART RATE: 94 BPM | TEMPERATURE: 98 F | DIASTOLIC BLOOD PRESSURE: 57 MMHG | SYSTOLIC BLOOD PRESSURE: 114 MMHG | RESPIRATION RATE: 18 BRPM

## 2021-08-30 DIAGNOSIS — Z98.890 OTHER SPECIFIED POSTPROCEDURAL STATES: Chronic | ICD-10-CM

## 2021-08-30 LAB
ALBUMIN SERPL ELPH-MCNC: 5 G/DL — SIGNIFICANT CHANGE UP (ref 3.5–5.2)
ALP SERPL-CCNC: 72 U/L — SIGNIFICANT CHANGE UP (ref 30–115)
ALT FLD-CCNC: 24 U/L — SIGNIFICANT CHANGE UP (ref 0–41)
ANION GAP SERPL CALC-SCNC: 11 MMOL/L — SIGNIFICANT CHANGE UP (ref 7–14)
AST SERPL-CCNC: 24 U/L — SIGNIFICANT CHANGE UP (ref 0–41)
BASOPHILS # BLD AUTO: 0.05 K/UL — SIGNIFICANT CHANGE UP (ref 0–0.2)
BASOPHILS NFR BLD AUTO: 0.9 % — SIGNIFICANT CHANGE UP (ref 0–1)
BILIRUB SERPL-MCNC: 0.3 MG/DL — SIGNIFICANT CHANGE UP (ref 0.2–1.2)
BUN SERPL-MCNC: 11 MG/DL — SIGNIFICANT CHANGE UP (ref 10–20)
CALCIUM SERPL-MCNC: 10.4 MG/DL — HIGH (ref 8.5–10.1)
CHLORIDE SERPL-SCNC: 102 MMOL/L — SIGNIFICANT CHANGE UP (ref 98–110)
CO2 SERPL-SCNC: 30 MMOL/L — SIGNIFICANT CHANGE UP (ref 17–32)
CREAT SERPL-MCNC: 0.8 MG/DL — SIGNIFICANT CHANGE UP (ref 0.7–1.5)
EOSINOPHIL # BLD AUTO: 0.12 K/UL — SIGNIFICANT CHANGE UP (ref 0–0.7)
EOSINOPHIL NFR BLD AUTO: 2.2 % — SIGNIFICANT CHANGE UP (ref 0–8)
GLUCOSE SERPL-MCNC: 72 MG/DL — SIGNIFICANT CHANGE UP (ref 70–99)
HCT VFR BLD CALC: 43.4 % — SIGNIFICANT CHANGE UP (ref 37–47)
HGB BLD-MCNC: 14.4 G/DL — SIGNIFICANT CHANGE UP (ref 12–16)
IMM GRANULOCYTES NFR BLD AUTO: 0.4 % — HIGH (ref 0.1–0.3)
LYMPHOCYTES # BLD AUTO: 1.8 K/UL — SIGNIFICANT CHANGE UP (ref 1.2–3.4)
LYMPHOCYTES # BLD AUTO: 32.7 % — SIGNIFICANT CHANGE UP (ref 20.5–51.1)
MAGNESIUM SERPL-MCNC: 2.2 MG/DL — SIGNIFICANT CHANGE UP (ref 1.8–2.4)
MCHC RBC-ENTMCNC: 29.9 PG — SIGNIFICANT CHANGE UP (ref 27–31)
MCHC RBC-ENTMCNC: 33.2 G/DL — SIGNIFICANT CHANGE UP (ref 32–37)
MCV RBC AUTO: 90.2 FL — SIGNIFICANT CHANGE UP (ref 81–99)
MONOCYTES # BLD AUTO: 0.3 K/UL — SIGNIFICANT CHANGE UP (ref 0.1–0.6)
MONOCYTES NFR BLD AUTO: 5.5 % — SIGNIFICANT CHANGE UP (ref 1.7–9.3)
NEUTROPHILS # BLD AUTO: 3.21 K/UL — SIGNIFICANT CHANGE UP (ref 1.4–6.5)
NEUTROPHILS NFR BLD AUTO: 58.3 % — SIGNIFICANT CHANGE UP (ref 42.2–75.2)
NRBC # BLD: 0 /100 WBCS — SIGNIFICANT CHANGE UP (ref 0–0)
NT-PROBNP SERPL-SCNC: 57 PG/ML — SIGNIFICANT CHANGE UP (ref 0–300)
PLATELET # BLD AUTO: 231 K/UL — SIGNIFICANT CHANGE UP (ref 130–400)
POTASSIUM SERPL-MCNC: 4.3 MMOL/L — SIGNIFICANT CHANGE UP (ref 3.5–5)
POTASSIUM SERPL-SCNC: 4.3 MMOL/L — SIGNIFICANT CHANGE UP (ref 3.5–5)
PROT SERPL-MCNC: 7.8 G/DL — SIGNIFICANT CHANGE UP (ref 6–8)
RBC # BLD: 4.81 M/UL — SIGNIFICANT CHANGE UP (ref 4.2–5.4)
RBC # FLD: 12.6 % — SIGNIFICANT CHANGE UP (ref 11.5–14.5)
SARS-COV-2 RNA SPEC QL NAA+PROBE: SIGNIFICANT CHANGE UP
SODIUM SERPL-SCNC: 143 MMOL/L — SIGNIFICANT CHANGE UP (ref 135–146)
TROPONIN T SERPL-MCNC: <0.01 NG/ML — SIGNIFICANT CHANGE UP
TROPONIN T SERPL-MCNC: <0.01 NG/ML — SIGNIFICANT CHANGE UP
WBC # BLD: 5.5 K/UL — SIGNIFICANT CHANGE UP (ref 4.8–10.8)
WBC # FLD AUTO: 5.5 K/UL — SIGNIFICANT CHANGE UP (ref 4.8–10.8)

## 2021-08-30 PROCEDURE — 93010 ELECTROCARDIOGRAM REPORT: CPT

## 2021-08-30 PROCEDURE — 71045 X-RAY EXAM CHEST 1 VIEW: CPT | Mod: 26

## 2021-08-30 PROCEDURE — 99285 EMERGENCY DEPT VISIT HI MDM: CPT

## 2021-08-30 RX ORDER — PANTOPRAZOLE SODIUM 20 MG/1
40 TABLET, DELAYED RELEASE ORAL
Refills: 0 | Status: DISCONTINUED | OUTPATIENT
Start: 2021-08-30 | End: 2021-08-31

## 2021-08-30 RX ORDER — ENOXAPARIN SODIUM 100 MG/ML
40 INJECTION SUBCUTANEOUS DAILY
Refills: 0 | Status: DISCONTINUED | OUTPATIENT
Start: 2021-08-30 | End: 2021-08-31

## 2021-08-30 RX ORDER — ALBUTEROL 90 UG/1
1 AEROSOL, METERED ORAL EVERY 4 HOURS
Refills: 0 | Status: DISCONTINUED | OUTPATIENT
Start: 2021-08-30 | End: 2021-08-31

## 2021-08-30 RX ORDER — LANOLIN ALCOHOL/MO/W.PET/CERES
3 CREAM (GRAM) TOPICAL AT BEDTIME
Refills: 0 | Status: DISCONTINUED | OUTPATIENT
Start: 2021-08-30 | End: 2021-08-31

## 2021-08-30 RX ORDER — ATORVASTATIN CALCIUM 80 MG/1
80 TABLET, FILM COATED ORAL AT BEDTIME
Refills: 0 | Status: DISCONTINUED | OUTPATIENT
Start: 2021-08-30 | End: 2021-08-31

## 2021-08-30 RX ORDER — CLOPIDOGREL BISULFATE 75 MG/1
75 TABLET, FILM COATED ORAL DAILY
Refills: 0 | Status: DISCONTINUED | OUTPATIENT
Start: 2021-08-30 | End: 2021-08-31

## 2021-08-30 RX ORDER — ASPIRIN/CALCIUM CARB/MAGNESIUM 324 MG
325 TABLET ORAL ONCE
Refills: 0 | Status: COMPLETED | OUTPATIENT
Start: 2021-08-30 | End: 2021-08-30

## 2021-08-30 RX ORDER — ONDANSETRON 8 MG/1
4 TABLET, FILM COATED ORAL EVERY 8 HOURS
Refills: 0 | Status: DISCONTINUED | OUTPATIENT
Start: 2021-08-30 | End: 2021-08-31

## 2021-08-30 RX ORDER — MELOXICAM 15 MG/1
1 TABLET ORAL
Qty: 0 | Refills: 0 | DISCHARGE

## 2021-08-30 RX ORDER — OXYCODONE AND ACETAMINOPHEN 5; 325 MG/1; MG/1
1 TABLET ORAL EVERY 4 HOURS
Refills: 0 | Status: DISCONTINUED | OUTPATIENT
Start: 2021-08-30 | End: 2021-08-31

## 2021-08-30 RX ORDER — ASPIRIN/CALCIUM CARB/MAGNESIUM 324 MG
81 TABLET ORAL DAILY
Refills: 0 | Status: DISCONTINUED | OUTPATIENT
Start: 2021-08-30 | End: 2021-08-31

## 2021-08-30 RX ADMIN — ATORVASTATIN CALCIUM 80 MILLIGRAM(S): 80 TABLET, FILM COATED ORAL at 22:48

## 2021-08-30 RX ADMIN — Medication 325 MILLIGRAM(S): at 14:10

## 2021-08-30 RX ADMIN — Medication 50 MILLIGRAM(S): at 17:00

## 2021-08-30 RX ADMIN — OXYCODONE AND ACETAMINOPHEN 1 TABLET(S): 5; 325 TABLET ORAL at 17:03

## 2021-08-30 RX ADMIN — CLOPIDOGREL BISULFATE 75 MILLIGRAM(S): 75 TABLET, FILM COATED ORAL at 17:00

## 2021-08-30 NOTE — ED PROVIDER NOTE - CLINICAL SUMMARY MEDICAL DECISION MAKING FREE TEXT BOX
55yo F history of CAD PCIx1 presenting with CP- no DVT/PE risk factors. no LE pain/swelling. compliant with asa/plavix. Well appearing, NAD, non toxic. NCAT PERRLA EOMI neck supple non tender normal wob  WWPx4 neuro non focal. will admit for further eval

## 2021-08-30 NOTE — ED ADULT NURSE NOTE - OBJECTIVE STATEMENT
Pt. came to ED c/o chest pain traveling to right arm. Denies sob, injury, fever, chills, n/v/d. Hx of stent in th past.

## 2021-08-30 NOTE — H&P ADULT - HISTORY OF PRESENT ILLNESS
Pt is a 56 year old female with PMH CAD s/p stent to mid LAD in 2020 by Christine presents to ED with complaints of chest pain. Pt states it started last night and has been having mid-sternal chest pain, radiating into L neck, mild-moderate with no alleviating or aggravating factors. Pt denies any fever, chills, cough, sob, abdominal pain, nausea, vomiting, constipation and dysuria    In ED vitals: 114/57, HR 94, RR 18, T 98.2,Spo2 100% on RA   Trop neg x1, EKG NSR, no ST changes

## 2021-08-30 NOTE — H&P ADULT - NSHPLABSRESULTS_GEN_ALL_CORE
14.4   5.50  )-----------( 231      ( 30 Aug 2021 14:10 )             43.4       08-30    143  |  102  |  11  ----------------------------<  72  4.3   |  30  |  0.8    Ca    10.4<H>      30 Aug 2021 14:10  Mg     2.2     08-30    TPro  7.8  /  Alb  5.0  /  TBili  0.3  /  DBili  x   /  AST  24  /  ALT  24  /  AlkPhos  72  08-30                      Lactate Trend      CARDIAC MARKERS ( 30 Aug 2021 14:10 )  x     / <0.01 ng/mL / x     / x     / x            CAPILLARY BLOOD GLUCOSE

## 2021-08-30 NOTE — H&P ADULT - NSICDXPASTMEDICALHX_GEN_ALL_CORE_FT
PAST MEDICAL HISTORY:  Asthma no flare yrs no meds    CAD (coronary artery disease) s/p stent to LAD in 2020    Carpal tunnel syndrome of left wrist s/p rt ctr 5/17

## 2021-08-30 NOTE — H&P ADULT - ATTENDING COMMENTS
HPI as above.  Interval history: Pt seen and examined at bedside. No cp or sob.   Vital Signs (24 Hrs):  T(C): 36.3 (21 @ 22:10), Max: 36.7 (21 @ 20:30)  HR: 56 (21 @ 05:00) (56 - 77)  BP: 108/56 (21 @ 05:00) (108/56 - 122/67)  RR: 18 (21 @ 05:00) (18 - 18)  SpO2: 98% (21 @ 20:30) (98% - 99%)  Wt(kg): --  Daily Height in cm: 160.02 (30 Aug 2021 22:10)    Daily Weight in k.2 (31 Aug 2021 05:00)    I&O's Summary    PHYSICAL EXAM:  GENERAL: NAD, well-developed  HEAD:  Atraumatic, Normocephalic  EYES: EOMI, PERRLA, conjunctiva and sclera clear  NECK: Supple, No JVD  CHEST/LUNG: Clear to auscultation bilaterally; No wheeze  HEART: Regular rate and rhythm; No murmurs, rubs, or gallops  ABDOMEN: Soft, Nontender, Nondistended; Bowel sounds present  EXTREMITIES:  2+ Peripheral Pulses, No clubbing, cyanosis, or edema  PSYCH: AAOx3  NEUROLOGY: non-focal  SKIN: No rashes or lesions    Labs reviewed  Imaging reviewed  EKG reviewed    Plan  #Chest pain- Stress test as per cardiology, pt not complaining of CP , trops neg x3   #res as above       #Progress Note Handoff  Pending (specify):  stress test as per cardiology if neg pt can be DCed  Family discussion: dw pt and agreed to plan   Disposition: stress test

## 2021-08-30 NOTE — H&P ADULT - ASSESSMENT
Pt is a 56 year old female with PMH CAD s/p stent to mid LAD in 2020 by hCristine presents to ED with complaints of chest pain.     #Unstable Angina   #h/o CAD   - trop neg x1 -> f/u repeat  - EKG NSR, no ST changes  - continue atorvastatin, aspirin and plavix  - cardio consult - Dr. King     #Asthma - abluterol PRN     #Misc   - DVT ppx: lovenox  - GI ppx:PPI   - Diet DASH  - Code FULL

## 2021-08-30 NOTE — H&P ADULT - NSHPPHYSICALEXAM_GEN_ALL_CORE
Vital Signs Last 24 Hrs  T(C): 35.7 (30 Aug 2021 15:22), Max: 36.8 (30 Aug 2021 13:03)  T(F): 96.2 (30 Aug 2021 15:22), Max: 98.2 (30 Aug 2021 13:03)  HR: 71 (30 Aug 2021 15:22) (71 - 94)  BP: 117/64 (30 Aug 2021 15:22) (114/57 - 117/64)  BP(mean): --  RR: 18 (30 Aug 2021 15:22) (18 - 18)  SpO2: 99% (30 Aug 2021 15:22) (99% - 100%)    GENERAL: NAD, lying in bed comfortably  HEAD:  Atraumatic, Normocephalic  EYES: EOMI, conjunctiva and sclera clear  ENT: Moist mucous membranes  NECK: Supple, No JVD  CHEST/LUNG: Clear to auscultation bilaterally; Unlabored respirations  HEART: Regular rate and rhythm; No murmurs, rubs, or gallops  ABDOMEN: Bowel sounds present; Soft, Nontender, Nondistended.   EXTREMITIES: No clubbing, cyanosis, or edema  NERVOUS SYSTEM:  Alert & Oriented X3, speech clear. No deficits   SKIN: No rashes or lesions

## 2021-08-30 NOTE — CONSULT NOTE ADULT - ATTENDING COMMENTS
Cardiologist:  Dr. King    CAD s/p PCI mLAD (9/2020) with atypical chest pain    EKG:  NSR, no ischemic ST-T changes  Troponin negative    Exercise stress MPI to r/o obstructive CAD  If negative for stress-induced ischemia, no further inpatient cardiac workup  Outpatient follow up with Dr. King

## 2021-08-30 NOTE — ED PROVIDER NOTE - OBJECTIVE STATEMENT
Pt is a 56 year old female with PMH CAD ( (1) stent placed by Christine in 2020 in mid LAD) presents to ED with complaints of chest pain. Pt states since last night has been having mid-sternal chest pain, radiating into L neck, mild-moderate with no alleviating or aggravating factors. Pt denies any fever, chills, bodyaches, cough, sob, abdominal pain, NVCD, dysuria

## 2021-08-30 NOTE — CONSULT NOTE ADULT - ASSESSMENT
Typical chest pain  CAD s/p PCI  Asthma    -repeat troponin and trend  -repeat EKG   -get TTE  -NG if chest pain  -c/w ASA, atorvastatin, plavix   Typical chest pain  Myocardial bridging  CAD s/p PCI  Asthma    -repeat troponin and trend  -repeat EKG   -get TTE  -NG if chest pain  -c/w ASA, atorvastatin, plavix  -would start metoprolol  -NST vs cath if symptoms persist   Typical chest pain  Myocardial bridging  CAD s/p PCI  Asthma    -repeat troponin and trend  -repeat EKG   -get TTE  -NG if chest pain  -c/w ASA, atorvastatin, plavix  -would start metoprolol  -NPO after MN     Typical chest pain  Myocardial bridging  CAD s/p PCI  Asthma    -repeat troponin and trend  -repeat EKG   -get TTE  -NG if chest pain  -c/w ASA, atorvastatin, plavix  -would start metoprolol  -NST vs cath. NPO after MN nonetheless     Chest pain  Myocardial bridging  CAD s/p PCI  Asthma    -repeat troponin and trend  -repeat EKG   -get TTE  -NG if chest pain  -c/w ASA, atorvastatin, plavix  -would start metoprolol  -NST vs cath. NPO after MN nonetheless

## 2021-08-30 NOTE — ED ADULT TRIAGE NOTE - HEIGHT IN INCHES
Normal function and healed incision.  No AF noted on today's device interrogation.  Continue current therapy.   1

## 2021-08-30 NOTE — CONSULT NOTE ADULT - SUBJECTIVE AND OBJECTIVE BOX
HPI:  Pt is a 56 year old female with PMH CAD s/p stent to mid LAD in 2020 by Christine presents to ED with complaints of chest pain. Pt states it started last night and has been having mid-sternal chest pain, radiating into L neck, mild-moderate with no alleviating or aggravating factors. Pt denies any fever, chills, cough, sob, abdominal pain, nausea, vomiting, constipation and dysuria    In ED vitals: 114/57, HR 94, RR 18, T 98.2,Spo2 100% on RA   Trop neg x1, EKG NSR, no ST changes   (30 Aug 2021 15:32)      PAST MEDICAL & SURGICAL HISTORY  Carpal tunnel syndrome of left wrist  s/p rt ctr 5/17    Asthma  no flare yrs no meds    CAD (coronary artery disease)  s/p stent to LAD in 2020    H/O arthroscopy of shoulder  rt 2017    S/P lumbar discectomy    FAMILY HISTORY:  FAMILY HISTORY:    [ ] no pertinent family history of premature cardiovascular disease in first degree relatives.  Mother:   Father:   Siblings:     SOCIAL HISTORY:  []smoker  []Alcohol  []Drug    ALLERGIES:  No Known Allergies    MEDICATIONS:  MEDICATIONS  (STANDING):  aspirin enteric coated 81 milliGRAM(s) Oral daily  atorvastatin 80 milliGRAM(s) Oral at bedtime  clopidogrel Tablet 75 milliGRAM(s) Oral daily  enoxaparin Injectable 40 milliGRAM(s) SubCutaneous daily  pantoprazole    Tablet 40 milliGRAM(s) Oral before breakfast  pregabalin 50 milliGRAM(s) Oral two times a day    MEDICATIONS  (PRN):  ALBUTerol    90 MICROgram(s) HFA Inhaler 1 Puff(s) Inhalation every 4 hours PRN Shortness of Breath and/or Wheezing  aluminum hydroxide/magnesium hydroxide/simethicone Suspension 30 milliLiter(s) Oral every 4 hours PRN Dyspepsia  melatonin 3 milliGRAM(s) Oral at bedtime PRN Insomnia  ondansetron Injectable 4 milliGRAM(s) IV Push every 8 hours PRN Nausea and/or Vomiting  oxycodone    5 mG/acetaminophen 325 mG 1 Tablet(s) Oral every 4 hours PRN Moderate Pain (4 - 6)      HOME MEDICATIONS:  Home Medications:    VITALS:   T(F): 96.2 (08-30 @ 15:22), Max: 98.2 (08-30 @ 13:03)  HR: 71 (08-30 @ 15:22) (71 - 94)  BP: 117/64 (08-30 @ 15:22) (114/57 - 117/64)  BP(mean): --  RR: 18 (08-30 @ 15:22) (18 - 18)  SpO2: 99% (08-30 @ 15:22) (99% - 100%)    REVIEW OF SYSTEMS:  CONSTITUTIONAL: No weakness, fevers or chills  EYES: No visual changes  ENT: No vertigo or throat pain   NECK: No pain or stiffness  RESPIRATORY: No cough, wheezing, hemoptysis; No shortness of breath  CARDIOVASCULAR: No chest pain or palpitations  GASTROINTESTINAL: No abdominal or epigastric pain. No nausea, vomiting, or hematemesis; No diarrhea or constipation. No melena or hematochezia.  GENITOURINARY: No dysuria, frequency or hematuria  NEUROLOGICAL: No numbness or weakness  SKIN: No itching, no rashes  MSK: FROM x4    PHYSICAL EXAM:  NEURO: patient is awake , alert and oriented  GEN: Not in acute distress  NECK: no thyroid enlargement, no JVD  LUNGS: Clear to auscultation bilaterally   CARDIOVASCULAR: S1/S2 present, RRR , no murmurs or rubs, no carotid bruits,  + PP bilaterally  ABD: Soft, non-tender, non-distended, +BS  EXT: No KAM  SKIN: Intact    LABS:                        14.4   5.50  )-----------( 231      ( 30 Aug 2021 14:10 )             43.4     08-30    143  |  102  |  11  ----------------------------<  72  4.3   |  30  |  0.8    Ca    10.4<H>      30 Aug 2021 14:10  Mg     2.2     08-30    TPro  7.8  /  Alb  5.0  /  TBili  0.3  /  DBili  x   /  AST  24  /  ALT  24  /  AlkPhos  72  08-30      Troponin T, Serum: <0.01 ng/mL (08-30-21 @ 14:10)    CARDIAC MARKERS ( 30 Aug 2021 14:10 )  x     / <0.01 ng/mL / x     / x     / x        Serum Pro-Brain Natriuretic Peptide: 57 pg/mL (08-30-21 @ 14:10)    RADIOLOGY:  -CXR:  < from: Xray Chest 1 View- PORTABLE-Urgent (08.30.21 @ 15:03) >  Impression:    No radiographic evidence of acute cardiopulmonary disease.    < end of copied text >    -TTE:  < from: TTE Echo Complete w/o Contrast w/ Doppler (09.17.20 @ 09:34) >  Summary:   1. LV Ejection Fraction by Johnson's Method with a biplane EF of 69 %.   2. Mildly enlarged left atrium.   3. Normal right atrial size.   4. No evidence of mitral valve regurgitation.   5. Mild-moderate tricuspid regurgitation.    < end of copied text >    -CCTA:  < from: CT Heart with Coronaries (09.16.20 @ 10:44) >  IMPRESSION:    Noncalcified plaque within the proximal LAD contributing to moderate to severe stenosis.    Atherosclerotic disease elsewhere without significant stenosis    The total Agatston coronary artery calcium score equals 38, which corresponds to 93rd percentile for age, gender and ethnicity.    CAD-RADS 3/4.    < end of copied text >    -STRESS TEST:    -CATHETERIZATION:  < from: Cardiac Cath Lab - Adult (09.16.20 @ 17:58) >  CORONARY CIRCULATION: There was significant 1-vessel coronary artery    disease (LAD). Left main: The vessel was medium sized. Angiography showed    no evidence of disease. LAD: The vessel was medium sized. Proximal LAD:    Angiography showed mild atherosclerosis with no flow limiting lesions. Mid    LAD: There was a tubular 70 % stenosis at a site with no prior    intervention. This lesion is a likely culprit for the patient's anginal    symptoms. An intervention was performed. Distal LAD: Angiography showed    mild atherosclerosis with no flow limiting lesions. Myocardial bridging    was present. 1st diagonal: Angiography showed mild atherosclerosis with no    flow limiting lesions. Circumflex: The vessel was medium sized.    Angiography showed mild atherosclerosis with no flow limiting lesions. 1st    obtuse marginal: Angiography showed mild atherosclerosis with no flow    limiting lesions. RCA: The vessel was medium sized. Angiography showed    mild atherosclerosis with no flow limiting lesions.    < end of copied text >      ECG:  < from: 12 Lead ECG (08.30.21 @ 13:05) >  Ventricular Rate 93 BPM    Atrial Rate 93 BPM    P-R Interval 126 ms    QRS Duration 78 ms    Q-T Interval 350 ms    QTC Calculation(Bazett) 435 ms    P Axis 74 degrees    R Axis 75 degrees    T Axis 68 degrees    Diagnosis Line Normal sinus rhythm  Right atrial enlargement  Nonspecific ST abnormality  Abnormal ECG    < end of copied text >    < from: 12 Lead ECG (03.30.21 @ 13:40) >  Ventricular Rate 92 BPM    Atrial Rate 92 BPM    P-R Interval 128 ms    QRS Duration 72 ms    Q-T Interval 358 ms    QTC Calculation(Bazett) 442 ms    P Axis 75 degrees    R Axis 78 degrees    T Axis 57 degrees    Diagnosis Line Normal sinus rhythm  Normal ECG    < end of copied text >      TELEMETRY EVENTS:   HPI:  Pt is a 56 year old female with PMH CAD s/p stent to mid LAD in 2020 by Christine presents to ED with complaints of chest pain. Pt states it started last night and has been having mid-sternal chest pain, radiating into L neck, mild-moderate with no alleviating or aggravating factors. Pt denies any fever, chills, cough, sob, abdominal pain, nausea, vomiting, constipation and dysuria    In ED vitals: 114/57, HR 94, RR 18, T 98.2,Spo2 100% on RA   Trop neg x1, EKG NSR, no ST changes      PAST MEDICAL & SURGICAL HISTORY  Carpal tunnel syndrome of left wrist  s/p rt ctr 5/17    Asthma  no flare yrs no meds    CAD (coronary artery disease)  s/p stent to LAD in 2020    H/O arthroscopy of shoulder  rt 2017    S/P lumbar discectomy      No pertinent family history of premature CAD in first degree relatives    ALLERGIES:  No Known Allergies    MEDICATIONS:  MEDICATIONS  (STANDING):  aspirin enteric coated 81 milliGRAM(s) Oral daily  atorvastatin 80 milliGRAM(s) Oral at bedtime  clopidogrel Tablet 75 milliGRAM(s) Oral daily  enoxaparin Injectable 40 milliGRAM(s) SubCutaneous daily  pantoprazole    Tablet 40 milliGRAM(s) Oral before breakfast  pregabalin 50 milliGRAM(s) Oral two times a day    MEDICATIONS  (PRN):  ALBUTerol    90 MICROgram(s) HFA Inhaler 1 Puff(s) Inhalation every 4 hours PRN Shortness of Breath and/or Wheezing  aluminum hydroxide/magnesium hydroxide/simethicone Suspension 30 milliLiter(s) Oral every 4 hours PRN Dyspepsia  melatonin 3 milliGRAM(s) Oral at bedtime PRN Insomnia  ondansetron Injectable 4 milliGRAM(s) IV Push every 8 hours PRN Nausea and/or Vomiting  oxycodone    5 mG/acetaminophen 325 mG 1 Tablet(s) Oral every 4 hours PRN Moderate Pain (4 - 6)      HOME MEDICATIONS:  Home Medications:    VITALS:   T(F): 96.2 (08-30 @ 15:22), Max: 98.2 (08-30 @ 13:03)  HR: 71 (08-30 @ 15:22) (71 - 94)  BP: 117/64 (08-30 @ 15:22) (114/57 - 117/64)  BP(mean): --  RR: 18 (08-30 @ 15:22) (18 - 18)  SpO2: 99% (08-30 @ 15:22) (99% - 100%)    REVIEW OF SYSTEMS:  CONSTITUTIONAL: No weakness, fevers or chills  EYES: No visual changes  ENT: No vertigo or throat pain   NECK: No pain or stiffness  RESPIRATORY: No cough, wheezing, hemoptysis; No shortness of breath  CARDIOVASCULAR: No chest pain or palpitations  GASTROINTESTINAL: No abdominal or epigastric pain. No nausea, vomiting, or hematemesis; No diarrhea or constipation. No melena or hematochezia.  GENITOURINARY: No dysuria, frequency or hematuria  NEUROLOGICAL: No numbness or weakness  SKIN: No itching, no rashes  MSK: FROM x4    PHYSICAL EXAM:  NEURO: patient is awake , alert and oriented  GEN: Not in acute distress  NECK: no thyroid enlargement, no JVD  LUNGS: Clear to auscultation bilaterally   CARDIOVASCULAR: S1/S2 present, RRR , no murmurs or rubs, no carotid bruits,  + PP bilaterally  ABD: Soft, non-tender, non-distended, +BS  EXT: No KAM  SKIN: Intact      LABS:                        14.4   5.50  )-----------( 231      ( 30 Aug 2021 14:10 )             43.4     08-30    143  |  102  |  11  ----------------------------<  72  4.3   |  30  |  0.8    Ca    10.4<H>      30 Aug 2021 14:10  Mg     2.2     08-30    TPro  7.8  /  Alb  5.0  /  TBili  0.3  /  DBili  x   /  AST  24  /  ALT  24  /  AlkPhos  72  08-30      Troponin T, Serum: <0.01 ng/mL (08-30-21 @ 14:10)    Serum Pro-Brain Natriuretic Peptide: 57 pg/mL (08-30-21 @ 14:10)        RADIOLOGY:    < from: Xray Chest 1 View- PORTABLE-Urgent (08.30.21 @ 15:03) >  Impression:    No radiographic evidence of acute cardiopulmonary disease.    < end of copied text >        < from: TTE Echo Complete w/o Contrast w/ Doppler (09.17.20 @ 09:34) >  Summary:   1. LV Ejection Fraction by Johnson's Method with a biplane EF of 69 %.   2. Mildly enlarged left atrium.   3. Normal right atrial size.   4. No evidence of mitral valve regurgitation.   5. Mild-moderate tricuspid regurgitation.    < end of copied text >        < from: CT Heart with Coronaries (09.16.20 @ 10:44) >  IMPRESSION:    Noncalcified plaque within the proximal LAD contributing to moderate to severe stenosis.    Atherosclerotic disease elsewhere without significant stenosis    The total Agatston coronary artery calcium score equals 38, which corresponds to 93rd percentile for age, gender and ethnicity.    CAD-RADS 3/4.    < end of copied text >        < from: Cardiac Cath Lab - Adult (09.16.20 @ 17:58) >  CORONARY CIRCULATION: There was significant 1-vessel coronary artery    disease (LAD). Left main: The vessel was medium sized. Angiography showed    no evidence of disease. LAD: The vessel was medium sized. Proximal LAD:    Angiography showed mild atherosclerosis with no flow limiting lesions. Mid    LAD: There was a tubular 70 % stenosis at a site with no prior    intervention. This lesion is a likely culprit for the patient's anginal    symptoms. An intervention was performed. Distal LAD: Angiography showed    mild atherosclerosis with no flow limiting lesions. Myocardial bridging    was present. 1st diagonal: Angiography showed mild atherosclerosis with no    flow limiting lesions. Circumflex: The vessel was medium sized.    Angiography showed mild atherosclerosis with no flow limiting lesions. 1st    obtuse marginal: Angiography showed mild atherosclerosis with no flow    limiting lesions. RCA: The vessel was medium sized. Angiography showed    mild atherosclerosis with no flow limiting lesions.    < end of copied text >        < from: 12 Lead ECG (08.30.21 @ 13:05) >  Ventricular Rate 93 BPM    Atrial Rate 93 BPM    P-R Interval 126 ms    QRS Duration 78 ms    Q-T Interval 350 ms    QTC Calculation(Bazett) 435 ms    P Axis 74 degrees    R Axis 75 degrees    T Axis 68 degrees    Diagnosis Line Normal sinus rhythm  Right atrial enlargement  Nonspecific ST abnormality  Abnormal ECG    < end of copied text >

## 2021-08-30 NOTE — ED PROVIDER NOTE - NSICDXPASTMEDICALHX_GEN_ALL_CORE_FT
PAST MEDICAL HISTORY:  Asthma no flare yrs no meds    Carpal tunnel syndrome of left wrist s/p rt ctr 5/17

## 2021-08-31 ENCOUNTER — TRANSCRIPTION ENCOUNTER (OUTPATIENT)
Age: 56
End: 2021-08-31

## 2021-08-31 VITALS
TEMPERATURE: 98 F | RESPIRATION RATE: 17 BRPM | HEART RATE: 79 BPM | DIASTOLIC BLOOD PRESSURE: 59 MMHG | SYSTOLIC BLOOD PRESSURE: 107 MMHG

## 2021-08-31 LAB
A1C WITH ESTIMATED AVERAGE GLUCOSE RESULT: 5.5 % — SIGNIFICANT CHANGE UP (ref 4–5.6)
ALBUMIN SERPL ELPH-MCNC: 4.5 G/DL — SIGNIFICANT CHANGE UP (ref 3.5–5.2)
ALP SERPL-CCNC: 67 U/L — SIGNIFICANT CHANGE UP (ref 30–115)
ALT FLD-CCNC: 19 U/L — SIGNIFICANT CHANGE UP (ref 0–41)
ANION GAP SERPL CALC-SCNC: 8 MMOL/L — SIGNIFICANT CHANGE UP (ref 7–14)
AST SERPL-CCNC: 20 U/L — SIGNIFICANT CHANGE UP (ref 0–41)
BASOPHILS # BLD AUTO: 0.04 K/UL — SIGNIFICANT CHANGE UP (ref 0–0.2)
BASOPHILS NFR BLD AUTO: 0.8 % — SIGNIFICANT CHANGE UP (ref 0–1)
BILIRUB SERPL-MCNC: 0.7 MG/DL — SIGNIFICANT CHANGE UP (ref 0.2–1.2)
BUN SERPL-MCNC: 14 MG/DL — SIGNIFICANT CHANGE UP (ref 10–20)
CALCIUM SERPL-MCNC: 9.8 MG/DL — SIGNIFICANT CHANGE UP (ref 8.5–10.1)
CHLORIDE SERPL-SCNC: 104 MMOL/L — SIGNIFICANT CHANGE UP (ref 98–110)
CHOLEST SERPL-MCNC: 112 MG/DL — SIGNIFICANT CHANGE UP
CO2 SERPL-SCNC: 28 MMOL/L — SIGNIFICANT CHANGE UP (ref 17–32)
CREAT SERPL-MCNC: 0.8 MG/DL — SIGNIFICANT CHANGE UP (ref 0.7–1.5)
EOSINOPHIL # BLD AUTO: 0.2 K/UL — SIGNIFICANT CHANGE UP (ref 0–0.7)
EOSINOPHIL NFR BLD AUTO: 4.1 % — SIGNIFICANT CHANGE UP (ref 0–8)
ESTIMATED AVERAGE GLUCOSE: 111 MG/DL — SIGNIFICANT CHANGE UP (ref 68–114)
GLUCOSE SERPL-MCNC: 98 MG/DL — SIGNIFICANT CHANGE UP (ref 70–99)
HCT VFR BLD CALC: 41.4 % — SIGNIFICANT CHANGE UP (ref 37–47)
HDLC SERPL-MCNC: 47 MG/DL — LOW
HGB BLD-MCNC: 13.9 G/DL — SIGNIFICANT CHANGE UP (ref 12–16)
IMM GRANULOCYTES NFR BLD AUTO: 0.4 % — HIGH (ref 0.1–0.3)
LIPID PNL WITH DIRECT LDL SERPL: 55 MG/DL — SIGNIFICANT CHANGE UP
LYMPHOCYTES # BLD AUTO: 1.58 K/UL — SIGNIFICANT CHANGE UP (ref 1.2–3.4)
LYMPHOCYTES # BLD AUTO: 32.4 % — SIGNIFICANT CHANGE UP (ref 20.5–51.1)
MCHC RBC-ENTMCNC: 30.4 PG — SIGNIFICANT CHANGE UP (ref 27–31)
MCHC RBC-ENTMCNC: 33.6 G/DL — SIGNIFICANT CHANGE UP (ref 32–37)
MCV RBC AUTO: 90.6 FL — SIGNIFICANT CHANGE UP (ref 81–99)
MONOCYTES # BLD AUTO: 0.34 K/UL — SIGNIFICANT CHANGE UP (ref 0.1–0.6)
MONOCYTES NFR BLD AUTO: 7 % — SIGNIFICANT CHANGE UP (ref 1.7–9.3)
NEUTROPHILS # BLD AUTO: 2.69 K/UL — SIGNIFICANT CHANGE UP (ref 1.4–6.5)
NEUTROPHILS NFR BLD AUTO: 55.3 % — SIGNIFICANT CHANGE UP (ref 42.2–75.2)
NON HDL CHOLESTEROL: 65 MG/DL — SIGNIFICANT CHANGE UP
NRBC # BLD: 0 /100 WBCS — SIGNIFICANT CHANGE UP (ref 0–0)
PLATELET # BLD AUTO: 220 K/UL — SIGNIFICANT CHANGE UP (ref 130–400)
POTASSIUM SERPL-MCNC: 4.5 MMOL/L — SIGNIFICANT CHANGE UP (ref 3.5–5)
POTASSIUM SERPL-SCNC: 4.5 MMOL/L — SIGNIFICANT CHANGE UP (ref 3.5–5)
PROT SERPL-MCNC: 6.9 G/DL — SIGNIFICANT CHANGE UP (ref 6–8)
RBC # BLD: 4.57 M/UL — SIGNIFICANT CHANGE UP (ref 4.2–5.4)
RBC # FLD: 12.8 % — SIGNIFICANT CHANGE UP (ref 11.5–14.5)
SODIUM SERPL-SCNC: 140 MMOL/L — SIGNIFICANT CHANGE UP (ref 135–146)
TRIGL SERPL-MCNC: 90 MG/DL — SIGNIFICANT CHANGE UP
TROPONIN T SERPL-MCNC: <0.01 NG/ML — SIGNIFICANT CHANGE UP
WBC # BLD: 4.87 K/UL — SIGNIFICANT CHANGE UP (ref 4.8–10.8)
WBC # FLD AUTO: 4.87 K/UL — SIGNIFICANT CHANGE UP (ref 4.8–10.8)

## 2021-08-31 PROCEDURE — 99236 HOSP IP/OBS SAME DATE HI 85: CPT

## 2021-08-31 PROCEDURE — 93018 CV STRESS TEST I&R ONLY: CPT

## 2021-08-31 PROCEDURE — 99222 1ST HOSP IP/OBS MODERATE 55: CPT | Mod: 59

## 2021-08-31 PROCEDURE — 93016 CV STRESS TEST SUPVJ ONLY: CPT

## 2021-08-31 PROCEDURE — 78452 HT MUSCLE IMAGE SPECT MULT: CPT | Mod: 26

## 2021-08-31 RX ORDER — SENNA PLUS 8.6 MG/1
2 TABLET ORAL ONCE
Refills: 0 | Status: COMPLETED | OUTPATIENT
Start: 2021-08-31 | End: 2021-08-31

## 2021-08-31 RX ORDER — ALBUTEROL 90 UG/1
1 AEROSOL, METERED ORAL
Qty: 1 | Refills: 0
Start: 2021-08-31 | End: 2021-11-28

## 2021-08-31 RX ORDER — CLOPIDOGREL BISULFATE 75 MG/1
1 TABLET, FILM COATED ORAL
Qty: 0 | Refills: 0 | DISCHARGE
Start: 2021-08-31

## 2021-08-31 RX ORDER — ASPIRIN/CALCIUM CARB/MAGNESIUM 324 MG
1 TABLET ORAL
Qty: 0 | Refills: 0 | DISCHARGE
Start: 2021-08-31

## 2021-08-31 RX ORDER — PANTOPRAZOLE SODIUM 20 MG/1
1 TABLET, DELAYED RELEASE ORAL
Qty: 0 | Refills: 0 | DISCHARGE
Start: 2021-08-31

## 2021-08-31 RX ORDER — OXYCODONE AND ACETAMINOPHEN 5; 325 MG/1; MG/1
1 TABLET ORAL
Qty: 0 | Refills: 0 | DISCHARGE
Start: 2021-08-31

## 2021-08-31 RX ORDER — SENNA PLUS 8.6 MG/1
2 TABLET ORAL AT BEDTIME
Refills: 0 | Status: DISCONTINUED | OUTPATIENT
Start: 2021-08-31 | End: 2021-08-31

## 2021-08-31 RX ORDER — ATORVASTATIN CALCIUM 80 MG/1
1 TABLET, FILM COATED ORAL
Qty: 0 | Refills: 0 | DISCHARGE
Start: 2021-08-31

## 2021-08-31 RX ADMIN — ENOXAPARIN SODIUM 40 MILLIGRAM(S): 100 INJECTION SUBCUTANEOUS at 14:10

## 2021-08-31 RX ADMIN — PANTOPRAZOLE SODIUM 40 MILLIGRAM(S): 20 TABLET, DELAYED RELEASE ORAL at 05:50

## 2021-08-31 RX ADMIN — SENNA PLUS 2 TABLET(S): 8.6 TABLET ORAL at 14:09

## 2021-08-31 RX ADMIN — OXYCODONE AND ACETAMINOPHEN 1 TABLET(S): 5; 325 TABLET ORAL at 06:31

## 2021-08-31 RX ADMIN — Medication 50 MILLIGRAM(S): at 05:50

## 2021-08-31 RX ADMIN — CLOPIDOGREL BISULFATE 75 MILLIGRAM(S): 75 TABLET, FILM COATED ORAL at 14:09

## 2021-08-31 RX ADMIN — Medication 81 MILLIGRAM(S): at 14:09

## 2021-08-31 NOTE — DISCHARGE NOTE NURSING/CASE MANAGEMENT/SOCIAL WORK - PATIENT PORTAL LINK FT
You can access the FollowMyHealth Patient Portal offered by [facility] by registering at the following website: [email or web address]. By joining [facility]’s FollowMyHealth portal, you will also be able to view your health information using other applications (apps) compatible with our system.

## 2021-08-31 NOTE — DISCHARGE NOTE PROVIDER - CARE PROVIDER_API CALL
Demetrius Gayle  Internal Medicine  [address] [place] [address]  Phone: [phone]  Fax: [phone]  Follow Up Time: 2 weeks    Yosvany King)  Cardiovascular Disease; Internal Medicine; Interventional Cardiology; Nuclear Cardiology  [address] [place] [address], [facility] [address]  [place] [address]  Phone: [phone]  Fax: [phone]  Follow Up Time: 2 weeks

## 2021-08-31 NOTE — DISCHARGE NOTE PROVIDER - NSDCCPCAREPLAN_GEN_ALL_CORE_FT
PRINCIPAL DISCHARGE DIAGNOSIS  Diagnosis: Acute chest pain  Assessment and Plan of Treatment: You presented for chest pains. Ischemic pain was highest on the differential given your personal history of Coronary artery disease. Your EKG and Cardiac enzymes were negative. A Nuclear stress test is negative. You are cleared for discharge from cardiology standpoint. You will need to follow up with  as an outpatient. In case your symptoms return, or you get chest pains again, please do not hesitate to seek medical advice ro visit the closest emergency room

## 2021-08-31 NOTE — DISCHARGE NOTE PROVIDER - CARE PROVIDERS DIRECT ADDRESSES
[email or web address]

## 2021-08-31 NOTE — DISCHARGE NOTE PROVIDER - HOSPITAL COURSE
Pt is a 56 year old female with PMH CAD s/p stent to mid LAD in 2020 by Christine presents to ED with complaints of chest pain.     #Unstable Angina   #h/o CAD   - EKG NSR, no ST changes  - Trop: < 0.01 for 3 sets  - History of CAD s/p PCI to mLAD  September 2020  - Exercise Nuclear stress test performed on 8/31:   - Continue with Aspirin 81mg po once daily  - Continue with plavix 75mg po once daily  - Continue with Atorvastatin 40mg po once daily    Stress test is negative  Patient is cleared for discharge, follow up with .            Pt is a 56 year old female with PMH CAD s/p stent to mid LAD in 2020 by Christine presents to ED with complaints of chest pain.     #Unstable Angina   #h/o CAD   - EKG NSR, no ST changes  - Trop: < 0.01 for 3 sets  - History of CAD s/p PCI to mLAD  September 2020  - Exercise Nuclear stress test performed on 8/31: Negative   - < from: NM Nuclear Stress Multiple (08.31.21 @ 13:48) >  Impression:  1. NORMAL STRESS AND REST MYOCARDIAL PERFUSION SPECT TOMOGRAPHY, WITH NO EVIDENCE FOR ISCHEMIA AT THE LEVEL OF EXERCISE ATTAINED.  2. NORMAL RESTING LEFT VENTRICULAR WALL MOTION AND WALL THICKENING.  3. LEFT VENTRICULAR EJECTION FRACTION OF 58 % WHICH IS WITHIN RANGE OF NORMAL.  - Continue with Aspirin 81mg po once daily  - Continue with plavix 75mg po once daily  - Continue with Atorvastatin 40mg po once daily    Stress test is negative  Patient is cleared for discharge, follow up with .

## 2021-08-31 NOTE — DISCHARGE NOTE PROVIDER - NSDCMRMEDTOKEN_GEN_ALL_CORE_FT
albuterol 90 mcg/inh inhalation aerosol: 1 puff(s) inhaled every 4 hours, As needed, Shortness of Breath and/or Wheezing  aspirin 81 mg oral delayed release tablet: 1 tab(s) orally once a day  atorvastatin 80 mg oral tablet: 1 tab(s) orally once a day (at bedtime)  clopidogrel 75 mg oral tablet: 1 tab(s) orally once a day  Colace 100 mg oral capsule: 1 cap(s) orally once a day   oxycodone-acetaminophen 5 mg-325 mg oral tablet: 1 tab(s) orally every 4 hours, As needed, Moderate Pain (4 - 6)  pantoprazole 40 mg oral delayed release tablet: 1 tab(s) orally once a day (before a meal)  pregabalin 50 mg oral capsule: 1 cap(s) orally 2 times a day

## 2021-08-31 NOTE — DISCHARGE NOTE PROVIDER - PROVIDER TOKENS
PROVIDER:[TOKEN:[[id number]],FOLLOWUP:[2 weeks]],PROVIDER:[TOKEN:[[id number]],FOLLOWUP:[2 weeks]]

## 2021-09-01 LAB
COVID-19 SPIKE DOMAIN AB INTERP: NEGATIVE — SIGNIFICANT CHANGE UP
COVID-19 SPIKE DOMAIN ANTIBODY RESULT: 0.4 U/ML — SIGNIFICANT CHANGE UP
SARS-COV-2 IGG+IGM SERPL QL IA: 0.4 U/ML — SIGNIFICANT CHANGE UP
SARS-COV-2 IGG+IGM SERPL QL IA: NEGATIVE — SIGNIFICANT CHANGE UP

## 2021-09-02 DIAGNOSIS — R07.9 CHEST PAIN, UNSPECIFIED: ICD-10-CM

## 2021-09-02 DIAGNOSIS — J45.909 UNSPECIFIED ASTHMA, UNCOMPLICATED: ICD-10-CM

## 2021-09-02 DIAGNOSIS — I25.10 ATHEROSCLEROTIC HEART DISEASE OF NATIVE CORONARY ARTERY WITHOUT ANGINA PECTORIS: ICD-10-CM

## 2021-09-02 DIAGNOSIS — Z20.822 CONTACT WITH AND (SUSPECTED) EXPOSURE TO COVID-19: ICD-10-CM

## 2021-09-02 DIAGNOSIS — Z79.02 LONG TERM (CURRENT) USE OF ANTITHROMBOTICS/ANTIPLATELETS: ICD-10-CM

## 2021-09-02 DIAGNOSIS — Z79.82 LONG TERM (CURRENT) USE OF ASPIRIN: ICD-10-CM

## 2021-09-02 DIAGNOSIS — R07.89 OTHER CHEST PAIN: ICD-10-CM

## 2021-09-02 DIAGNOSIS — E78.5 HYPERLIPIDEMIA, UNSPECIFIED: ICD-10-CM

## 2021-09-02 DIAGNOSIS — Z95.5 PRESENCE OF CORONARY ANGIOPLASTY IMPLANT AND GRAFT: ICD-10-CM

## 2021-09-18 ENCOUNTER — EMERGENCY (EMERGENCY)
Facility: HOSPITAL | Age: 56
LOS: 0 days | Discharge: HOME | End: 2021-09-18
Attending: EMERGENCY MEDICINE | Admitting: EMERGENCY MEDICINE
Payer: MEDICARE

## 2021-09-18 VITALS
WEIGHT: 126.1 LBS | RESPIRATION RATE: 18 BRPM | HEIGHT: 63 IN | DIASTOLIC BLOOD PRESSURE: 70 MMHG | SYSTOLIC BLOOD PRESSURE: 106 MMHG | HEART RATE: 86 BPM | TEMPERATURE: 99 F | OXYGEN SATURATION: 95 %

## 2021-09-18 DIAGNOSIS — I25.10 ATHEROSCLEROTIC HEART DISEASE OF NATIVE CORONARY ARTERY WITHOUT ANGINA PECTORIS: ICD-10-CM

## 2021-09-18 DIAGNOSIS — Z79.02 LONG TERM (CURRENT) USE OF ANTITHROMBOTICS/ANTIPLATELETS: ICD-10-CM

## 2021-09-18 DIAGNOSIS — Z87.891 PERSONAL HISTORY OF NICOTINE DEPENDENCE: ICD-10-CM

## 2021-09-18 DIAGNOSIS — S40.022A CONTUSION OF LEFT UPPER ARM, INITIAL ENCOUNTER: ICD-10-CM

## 2021-09-18 DIAGNOSIS — J45.909 UNSPECIFIED ASTHMA, UNCOMPLICATED: ICD-10-CM

## 2021-09-18 DIAGNOSIS — I10 ESSENTIAL (PRIMARY) HYPERTENSION: ICD-10-CM

## 2021-09-18 DIAGNOSIS — Z79.82 LONG TERM (CURRENT) USE OF ASPIRIN: ICD-10-CM

## 2021-09-18 DIAGNOSIS — Z87.39 PERSONAL HISTORY OF OTHER DISEASES OF THE MUSCULOSKELETAL SYSTEM AND CONNECTIVE TISSUE: ICD-10-CM

## 2021-09-18 DIAGNOSIS — Z98.890 OTHER SPECIFIED POSTPROCEDURAL STATES: Chronic | ICD-10-CM
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
## 2021-09-18 DIAGNOSIS — Z95.5 PRESENCE OF CORONARY ANGIOPLASTY IMPLANT AND GRAFT: ICD-10-CM

## 2021-09-18 DIAGNOSIS — E78.5 HYPERLIPIDEMIA, UNSPECIFIED: ICD-10-CM

## 2021-09-18 DIAGNOSIS — Z79.899 OTHER LONG TERM (CURRENT) DRUG THERAPY: ICD-10-CM
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
## 2021-09-18 PROCEDURE — 99284 EMERGENCY DEPT VISIT MOD MDM: CPT

## 2021-09-18 PROCEDURE — 93971 EXTREMITY STUDY: CPT | Mod: 26,LT

## 2021-09-18 NOTE — ED PROVIDER NOTE - NSFOLLOWUPINSTRUCTIONS_ED_ALL_ED_FT
Please follow up with your primary care physician in 1-2 days.     Hematoma    A hematoma is a collection of blood under the skin, in an organ, in a body space, in a joint space, or in other tissue. The blood can thicken (clot) to form a lump that you can see and feel. The lump is often firm and may become sore and tender. Most hematomas get better in a few days to weeks. However, some hematomas may be serious and require medical care. Hematomas can range from very small to very large.    What are the causes?  This condition is caused by:  •A blunt or penetrating injury.  •A leakage from a blood vessel under the skin.  •Some medical procedures, including surgeries, such as oral surgery, face lifts, and surgeries on the joints.  •Some medical conditions that cause bleeding or bruising. There may be multiple hematomas that appear in different areas of the body.    What increases the risk?  You are more likely to develop this condition if:  •You are an older adult.  •You use blood thinners.    What are the signs or symptoms?     Symptoms of this condition depend on where the hematoma is located.   Common symptoms of a hematoma that is under the skin include:  •A firm lump on the body.  •Pain and tenderness in the area.  •Bruising. Blue, dark blue, purple-red, or yellowish skin (discoloration) may appear at the site of the hematoma if the hematoma is close to the surface of the skin.    Common symptoms of a hematoma that is deep in the tissues or body spaces may be less obvious. They include:  •A collection of blood in the stomach (intra-abdominal hematoma). This may cause pain in the abdomen, weakness, fainting, and shortness of breath.   •A collection of blood in the head (intracranial hematoma). This may cause a headache or symptoms such as weakness, trouble speaking or understanding, or a change in consciousness.     How is this diagnosed?  This condition is diagnosed based on:  •Your medical history.  •A physical exam.  •Imaging tests, such as an ultrasound or CT scan. These may be needed if your health care provider suspects a hematoma in deeper tissues or body spaces.  •Blood tests. These may be needed if your health care provider believes that the hematoma is caused by a medical condition.    How is this treated?  Treatment for this condition depends on the cause, size, and location of the hematoma. Treatment may include:  •Doing nothing. The majority of hematomas do not need treatment as many of them go away on their own over time.  •Surgery or close monitoring. This may be needed for large hematomas or hematomas that affect vital organs.  •Medicines. Medicines may be given if there is an underlying medical cause for the hematoma.    Follow these instructions at home:    Managing pain, stiffness, and swelling    •If directed, put ice on the affected area.  •Put ice in a plastic bag.  •Place a towel between your skin and the bag.  •Leave the ice on for 20 minutes, 2–3 times a day for the first couple of days.  •If directed, apply heat to the affected area after applying ice for a couple of days. Use the heat source that your health care provider recommends, such as a moist heat pack or a heating pad.  •Place a towel between your skin and the heat source.   •Leave the heat on for 20–30 minutes.   •Remove the heat if your skin turns bright red. This is especially important if you are unable to feel pain, heat, or cold. You may have a greater risk of getting burned.  •Raise (elevate) the affected area above the level of your heart while you are sitting or lying down.  •If told, wrap the affected area with an elastic bandage. The bandage applies pressure (compression) to the area, which may help to reduce swelling and promote healing. Do not wrap the bandage too tightly around the affected area.  •If your hematoma is on a leg or foot (lower extremity) and is painful, your health care provider may recommend crutches. Use them as told by your health care provider.    General instructions     •Take over-the-counter and prescription medicines only as told by your health care provider.  •Keep all follow-up visits as told by your health care provider. This is important.    Contact a health care provider if:  •You have a fever.  •The swelling or discoloration gets worse.  •You develop more hematomas.    Get help right away if:  •Your pain is worse or your pain is not controlled with medicine.  •Your skin over the hematoma breaks or starts bleeding.  •Your hematoma is in your chest or abdomen and you have weakness, shortness of breath, or a change in consciousness.  •You have a hematoma on your scalp that is caused by a fall or injury, and you also have:  •A headache that gets worse.  •Trouble speaking or understanding speech.   •Weakness.  •Change in alertness or consciousness.    Summary  •A hematoma is a collection of blood under the skin, in an organ, in a body space, in a joint space, or in other tissue.  •This condition usually does not need treatment because many hematomas go away on their own over time.  •Large hematomas, or those that may affect vital organs, may need surgical drainage or monitoring. If the hematoma is caused by a medical condition, medicines may be prescribed.  •Get help right away if your hematoma breaks or starts to bleed, you have shortness of breath, or you have a headache or trouble speaking after a fall.

## 2021-09-18 NOTE — ED ADULT NURSE NOTE - NSIMPLEMENTINTERV_GEN_ALL_ED
Implemented All Universal Safety Interventions:  [place] to call system. Call bell, personal items and telephone within reach. Instruct patient to call for assistance. Room bathroom lighting operational. Non-slip footwear when patient is off stretcher. Physically safe environment: no spills, clutter or unnecessary equipment. Stretcher in lowest position, wheels locked, appropriate side rails in place.

## 2021-09-18 NOTE — ED PROVIDER NOTE - NSICDXPASTSURGICALHX_GEN_ALL_CORE_FT
PAST SURGICAL HISTORY:  H/O arthroscopy of shoulder rt 2017    S/P drug eluting coronary stent placement 08/2021    S/P lumbar discectomy

## 2021-09-18 NOTE — ED PROVIDER NOTE - NS ED ATTENDING STATEMENT MOD
Attending with
I will START or STAY ON the medications listed below when I get home from the hospital:    mirtazapine 45 mg oral tablet  -- 1 tab(s) by mouth once a day (at bedtime)  -- Indication: For Recurrent major depressive disorder    meclizine 25 mg oral tablet  -- 1 tab(s) by mouth 3 times a day PRN vertigo  -- May cause drowsiness.  Alcohol may intensify this effect.  Use care when operating dangerous machinery.    -- Indication: For prn vertigo    albuterol 90 mcg/inh inhalation aerosol  -- 2 puff(s) inhaled every 6 hours, As needed, Bronchospasm  -- Indication: For asthma    budesonide-formoterol 80 mcg-4.5 mcg/inh inhalation aerosol  -- 2 puff(s) inhaled 2 times a day   -- Indication: For asthma    melatonin 3 mg oral tablet  -- 1 tab(s) by mouth once a day (at bedtime)  -- Indication: For prn insomnia    Multiple Vitamins with Minerals oral tablet  -- 1 tab(s) by mouth once a day  -- Indication: For supplement

## 2021-09-18 NOTE — ED PROVIDER NOTE - NS ED ROS FT
Constitutional: no fever, chills, no recent weight loss, change in appetite or malaise  Cardiac: No chest pain, SOB or edema.  Respiratory: No cough or respiratory distress  GI: No nausea, vomiting, diarrhea or abdominal pain.  MS: no pain to back or extremities, no loss of ROM, no weakness  Neuro: No headache or weakness. No LOC.  Skin: see HPI  Endocrine: No history of thyroid disease or diabetes.

## 2021-09-18 NOTE — ED PROVIDER NOTE - OBJECTIVE STATEMENT
55 yo female hx of asthma/ CAD s/p stent in 02/2021 and placed on ASA/plavix present c/o bruising to left upper arm since this afternoon. denies any known injury and heavy lifting. She felt the little bump under the skin and concerned for blood clot so she comes to ED for evaluation. denies pain/swelling to left arm/hand.

## 2021-09-18 NOTE — ED PROVIDER NOTE - PATIENT PORTAL LINK FT
You can access the FollowMyHealth Patient Portal offered by [facility] by registering at the following website: [email or web address]. By joining [employer]’s FollowMyHealth portal, you will also be able to view your health information using other applications (apps) compatible with our system.

## 2021-09-18 NOTE — ED PROVIDER NOTE - CLINICAL SUMMARY MEDICAL DECISION MAKING FREE TEXT BOX
Patient presents with hematoma to the left upper extremity. duplex negative for dvt. Discharged with pmd follow up and return precautions.

## 2021-09-18 NOTE — ED PROVIDER NOTE - PHYSICAL EXAMINATION
CONSTITUTIONAL: Well-appearing; well-nourished; in no apparent distress.   EYES: PERRL; EOM intact.   CARDIOVASCULAR: Normal S1, S2; no murmurs, rubs, or gallops.   RESPIRATORY: Normal chest excursion with respiration; breath sounds clear and equal bilaterally; no wheezes, rhonchi, or rales.  GI/: Normal bowel sounds; non-distended; non-tender; no palpable organomegaly.   MS: left shoulder/elbow/hand with FROM. left hand nv intact  SKIN: + small hematoma noted to left mid upper arm. non-tender.   NEURO/PSYCH: A & O x 4; grossly unremarkable.

## 2021-09-19 DIAGNOSIS — Z95.5 PRESENCE OF CORONARY ANGIOPLASTY IMPLANT AND GRAFT: Chronic | ICD-10-CM

## 2021-09-19 PROBLEM — I25.10 ATHEROSCLEROTIC HEART DISEASE OF NATIVE CORONARY ARTERY WITHOUT ANGINA PECTORIS: Chronic | Status: ACTIVE | Noted: 2021-08-30

## 2021-10-27 ENCOUNTER — RX RENEWAL (OUTPATIENT)
Age: 56
End: 2021-10-27

## 2022-01-14 NOTE — ASU PATIENT PROFILE, ADULT - SURGERY TIME
1. Hold Eliquis starting 5 days before your surgery (1/21/22 will be your last day taking)  2. Hold losartan the morning of your surgery   3. Continue all other prescribed medications through surgery.  4. Do not take any ibuprofen, Motrin, Aleve, naproxen, aspirin or any herbal supplements starting one week before surgery.      
10:45

## 2022-02-23 ENCOUNTER — APPOINTMENT (OUTPATIENT)
Dept: CARDIOLOGY | Facility: CLINIC | Age: 57
End: 2022-02-23
Payer: MEDICARE

## 2022-02-23 VITALS
BODY MASS INDEX: 23.98 KG/M2 | DIASTOLIC BLOOD PRESSURE: 60 MMHG | WEIGHT: 127 LBS | HEIGHT: 61 IN | TEMPERATURE: 98.2 F | HEART RATE: 76 BPM | SYSTOLIC BLOOD PRESSURE: 100 MMHG

## 2022-02-23 PROCEDURE — 99214 OFFICE O/P EST MOD 30 MIN: CPT

## 2022-02-23 PROCEDURE — 93000 ELECTROCARDIOGRAM COMPLETE: CPT

## 2022-02-23 RX ORDER — ATORVASTATIN CALCIUM 40 MG/1
40 TABLET, FILM COATED ORAL DAILY
Qty: 90 | Refills: 3 | Status: DISCONTINUED | COMMUNITY
Start: 2020-10-02 | End: 2022-02-23

## 2022-02-23 RX ORDER — FAMOTIDINE 20 MG/1
20 TABLET, FILM COATED ORAL
Qty: 90 | Refills: 3 | Status: DISCONTINUED | COMMUNITY
Start: 2020-10-02 | End: 2022-02-23

## 2022-02-23 RX ORDER — NALOXEGOL OXALATE 25 MG/1
25 TABLET, FILM COATED ORAL DAILY
Refills: 0 | Status: DISCONTINUED | COMMUNITY
End: 2022-02-23

## 2022-02-23 RX ORDER — ALBUTEROL SULFATE 90 UG/1
108 (90 BASE) AEROSOL, METERED RESPIRATORY (INHALATION)
Refills: 0 | Status: DISCONTINUED | COMMUNITY
End: 2022-02-23

## 2022-02-23 RX ORDER — CLOPIDOGREL 75 MG/1
75 TABLET, FILM COATED ORAL
Refills: 0 | Status: DISCONTINUED | COMMUNITY
End: 2022-02-23

## 2022-02-23 RX ORDER — OXYCODONE HYDROCHLORIDE AND ACETAMINOPHEN 10; 325 MG/1; MG/1
10-325 TABLET ORAL
Refills: 0 | Status: DISCONTINUED | COMMUNITY
End: 2022-02-23

## 2022-02-23 NOTE — ASSESSMENT
[FreeTextEntry1] : 57 yo female with pmhx and presentation as above\par cad/pci of lad with johanna\par dyslipidemia\par asthma\par cont all meds\par repeat labs\par stress echo\par resume statin if lipids abn\par aggressive risk modif\par diet and act as tolerated\par rtc after testing

## 2022-02-23 NOTE — HISTORY OF PRESENT ILLNESS
[FreeTextEntry1] : 55 yo female with pmhx as below is here for a f/up visit\par 9/20 admitted to [facility] with cp, s/p cath/iFR guided pci of LAD with johanna\par doing well \par no active cv complains\par et is stable\par ros is otherwise as below

## 2022-02-25 LAB
ALBUMIN SERPL ELPH-MCNC: 5 G/DL
ALP BLD-CCNC: 56 U/L
ALT SERPL-CCNC: 13 U/L
ANION GAP SERPL CALC-SCNC: 14 MMOL/L
AST SERPL-CCNC: 18 U/L
BASOPHILS # BLD AUTO: 0.07 K/UL
BASOPHILS NFR BLD AUTO: 1.4 %
BILIRUB SERPL-MCNC: 0.5 MG/DL
BUN SERPL-MCNC: 14 MG/DL
CALCIUM SERPL-MCNC: 10 MG/DL
CHLORIDE SERPL-SCNC: 98 MMOL/L
CHOLEST SERPL-MCNC: 194 MG/DL
CO2 SERPL-SCNC: 26 MMOL/L
CREAT SERPL-MCNC: 0.9 MG/DL
EOSINOPHIL # BLD AUTO: 0.17 K/UL
EOSINOPHIL NFR BLD AUTO: 3.4 %
GLUCOSE SERPL-MCNC: 81 MG/DL
HCT VFR BLD CALC: 41 %
HDLC SERPL-MCNC: 60 MG/DL
HGB BLD-MCNC: 13.9 G/DL
IMM GRANULOCYTES NFR BLD AUTO: 0.2 %
LDLC SERPL CALC-MCNC: 100 MG/DL
LYMPHOCYTES # BLD AUTO: 1.84 K/UL
LYMPHOCYTES NFR BLD AUTO: 37 %
MAN DIFF?: NORMAL
MCHC RBC-ENTMCNC: 30.4 PG
MCHC RBC-ENTMCNC: 33.9 G/DL
MCV RBC AUTO: 89.7 FL
MONOCYTES # BLD AUTO: 0.3 K/UL
MONOCYTES NFR BLD AUTO: 6 %
NEUTROPHILS # BLD AUTO: 2.58 K/UL
NEUTROPHILS NFR BLD AUTO: 52 %
NONHDLC SERPL-MCNC: 134 MG/DL
PLATELET # BLD AUTO: 269 K/UL
POTASSIUM SERPL-SCNC: 3.9 MMOL/L
PROT SERPL-MCNC: 7.9 G/DL
RBC # BLD: 4.57 M/UL
RBC # FLD: 13 %
SODIUM SERPL-SCNC: 138 MMOL/L
TRIGL SERPL-MCNC: 147 MG/DL
TSH SERPL-ACNC: 0.78 UIU/ML
WBC # FLD AUTO: 4.97 K/UL

## 2022-03-07 ENCOUNTER — APPOINTMENT (OUTPATIENT)
Dept: CARDIOLOGY | Facility: CLINIC | Age: 57
End: 2022-03-07

## 2022-03-15 ENCOUNTER — APPOINTMENT (OUTPATIENT)
Dept: CARDIOLOGY | Facility: CLINIC | Age: 57
End: 2022-03-15
Payer: MEDICARE

## 2022-03-15 PROCEDURE — 93351 STRESS TTE COMPLETE: CPT

## 2022-03-15 PROCEDURE — 93320 DOPPLER ECHO COMPLETE: CPT

## 2022-03-15 PROCEDURE — 93325 DOPPLER ECHO COLOR FLOW MAPG: CPT

## 2022-04-15 ENCOUNTER — EMERGENCY (EMERGENCY)
Facility: HOSPITAL | Age: 57
LOS: 0 days | Discharge: HOME | End: 2022-04-15
Attending: EMERGENCY MEDICINE | Admitting: EMERGENCY MEDICINE
Payer: MEDICARE

## 2022-04-15 VITALS
OXYGEN SATURATION: 100 % | RESPIRATION RATE: 18 BRPM | DIASTOLIC BLOOD PRESSURE: 56 MMHG | TEMPERATURE: 98 F | HEIGHT: 63 IN | HEART RATE: 73 BPM | SYSTOLIC BLOOD PRESSURE: 109 MMHG

## 2022-04-15 DIAGNOSIS — I25.10 ATHEROSCLEROTIC HEART DISEASE OF NATIVE CORONARY ARTERY WITHOUT ANGINA PECTORIS: ICD-10-CM

## 2022-04-15 DIAGNOSIS — M32.9 SYSTEMIC LUPUS ERYTHEMATOSUS, UNSPECIFIED: ICD-10-CM

## 2022-04-15 DIAGNOSIS — Z98.890 OTHER SPECIFIED POSTPROCEDURAL STATES: Chronic | ICD-10-CM

## 2022-04-15 DIAGNOSIS — Z95.5 PRESENCE OF CORONARY ANGIOPLASTY IMPLANT AND GRAFT: Chronic | ICD-10-CM

## 2022-04-15 DIAGNOSIS — K08.89 OTHER SPECIFIED DISORDERS OF TEETH AND SUPPORTING STRUCTURES: ICD-10-CM

## 2022-04-15 DIAGNOSIS — Z95.5 PRESENCE OF CORONARY ANGIOPLASTY IMPLANT AND GRAFT: ICD-10-CM

## 2022-04-15 DIAGNOSIS — Z79.82 LONG TERM (CURRENT) USE OF ASPIRIN: ICD-10-CM

## 2022-04-15 PROCEDURE — 99284 EMERGENCY DEPT VISIT MOD MDM: CPT

## 2022-04-15 NOTE — ED PROVIDER NOTE - PHYSICAL EXAMINATION
Vital Signs: I have reviewed the initial vital signs.  Constitutional: well-nourished, appears stated age, no acute distress  HEENT: PERRL ,mmm, nml floor of the mouth, nml phonation without drooling or trismus, no focal dental ttp, + ttp of the gums on the left upper, no palpable abscess  Respiratory: unlabored respiratory effort, clear to auscultation bilaterally  Musculoskeletal: supple neck, no lower extremity edema  Integumentary: warm, dry, no rash  Neurologic: A&Ox3, no gross neuro deficits  Psychiatric: appropriate mood, appropriate affect

## 2022-04-15 NOTE — ED PROVIDER NOTE - OBJECTIVE STATEMENT
36-year-old female past medical history of CAD/stent, lupus presenting for evaluation of left upper tooth/gum pain for the past 3 days.  Denies any associated sore throat, difficulty swallowing or breathing, fever chills, facial swelling, ear or eye pain or any other acute complaints.  Patient took Percocet this morning with mild relief of pain.  Does not have a dentist. no recent dental work.  Will transfer to dental clinic for further evaluation.  Patient is amenable with the plan.

## 2022-04-15 NOTE — CONSULT NOTE ADULT - SUBJECTIVE AND OBJECTIVE BOX
Patient is a 56y old  Female who presents with a chief complaint of having toothache that started 4 days ago    HPI:      PAST MEDICAL & SURGICAL HISTORY:  Carpal tunnel syndrome of left wrist  s/p rt ctr 5/17    Asthma  no flare yrs no meds    CAD (coronary artery disease)  s/p stent to LAD in 2020    H/O arthroscopy of shoulder  rt 2017    S/P lumbar discectomy    S/P drug eluting coronary stent placement  08/2021      (   ) heart valve replacement  (   ) joint replacement  (   ) pregnancy    MEDICATIONS  (STANDING):    MEDICATIONS  (PRN):      Allergies    No Known Allergies    Intolerances        FAMILY HISTORY:      *SOCIAL HISTORY: (   ) Tobacco; (   ) ETOH    *Last Dental Visit:    Vital Signs Last 24 Hrs  T(C): 36.8 (15 Apr 2022 09:42), Max: 36.8 (15 Apr 2022 09:42)  T(F): 98.2 (15 Apr 2022 09:42), Max: 98.2 (15 Apr 2022 09:42)  HR: 73 (15 Apr 2022 09:42) (73 - 73)  BP: 109/56 (15 Apr 2022 09:42) (109/56 - 109/56)  BP(mean): --  RR: 18 (15 Apr 2022 09:42) (18 - 18)  SpO2: 100% (15 Apr 2022 09:42) (100% - 100%)    LABS:                  EOE:  TMJ ( -  ) clicks                     ( -  ) pops                     ( -  ) crepitus             Mandible <<FROM>>             Facial bones and MOM <<grossly intact>>             ( -  ) trismus             ( -  ) lymphadenopathy             ( -  ) swelling             ( -  ) asymmetry             ( -  ) palpation             ( -  ) dyspnea             ( -  ) dysphagia             ( -  ) loss of consciousness    IOE:  <<permanent>> dentition:  <<multiple missing teeth>>           hard/soft palate:  ( -  ) palatal torus, <<No pathology noted>>           tongue/FOM <<No pathology noted>>           labial/buccal mucosa <<No pathology noted>>           ( -  ) percussion           ( +  ) palpation           ( +  ) swelling            ( -  ) abscess           ( -  ) sinus tract    Dentition present: <<   >>  Mobility: <<  >>  Caries: <<   >>         *DENTAL RADIOGRAPHS: 1 periapical radiograph    RADIOLOGY & ADDITIONAL STUDIES:    *ASSESSMENT: clinical exam shows pain upon palpation on the buccal surface of the attached gingiva of #15. # 15 is part of the bridge extending #13-x15 and is root canal treated. There is a deep pocket of >8mm in the mid-buccal surface of the tooth that involved the furcation. based on the clinical and radiographic findings #15 is fractured. explained to the patient and patient decided to extract the tooth understanding that #14 pontic will be removed along with #15.        *PLAN: Risks and benefits of #15 extraction was discussed as per OS sheet dated 7/13/00. Consent and side site signed. Anesthetized 1 carpule 4% Septocaine, 1:100,000 Epinephrine via infiltration in the area of #15.The bridge was sectioned in the distal of #13 and removed. The tooth was extracted surgically. Curettaged and irrigated copiously with saline. Hemostasis was achieved. Post-op periapical radiograph was taken with negative findings. Post-op instructions were given verbally and written. Patient dismissed stable.         RECOMMENDATIONS:  1) <<1- Amoxicillin 500mg, q8h x 7 days  2- Ibuprofen 600mg, q6h x 5days taken with meal  3- No spitting and smoking  4- Rinsing with saline 12 hours post extraction  5- Soft diet >>  2) Dental F/U with outpatient dentist for comprehensive dental care.   3) If any difficulty swallowing/breathing, fever occur, return to ER.     Melita Baumann DMD pager #[phone]

## 2022-05-24 ENCOUNTER — EMERGENCY (EMERGENCY)
Facility: HOSPITAL | Age: 57
LOS: 0 days | Discharge: HOME | End: 2022-05-24
Attending: EMERGENCY MEDICINE | Admitting: EMERGENCY MEDICINE
Payer: MEDICARE

## 2022-05-24 VITALS — HEART RATE: 100 BPM | SYSTOLIC BLOOD PRESSURE: 119 MMHG | DIASTOLIC BLOOD PRESSURE: 72 MMHG

## 2022-05-24 VITALS
DIASTOLIC BLOOD PRESSURE: 80 MMHG | SYSTOLIC BLOOD PRESSURE: 140 MMHG | OXYGEN SATURATION: 98 % | TEMPERATURE: 99 F | HEIGHT: 63 IN | HEART RATE: 91 BPM | RESPIRATION RATE: 18 BRPM | WEIGHT: 126.99 LBS

## 2022-05-24 DIAGNOSIS — R55 SYNCOPE AND COLLAPSE: ICD-10-CM

## 2022-05-24 DIAGNOSIS — R06.02 SHORTNESS OF BREATH: ICD-10-CM

## 2022-05-24 DIAGNOSIS — J45.909 UNSPECIFIED ASTHMA, UNCOMPLICATED: ICD-10-CM

## 2022-05-24 DIAGNOSIS — Z98.890 OTHER SPECIFIED POSTPROCEDURAL STATES: Chronic | ICD-10-CM

## 2022-05-24 DIAGNOSIS — Z79.02 LONG TERM (CURRENT) USE OF ANTITHROMBOTICS/ANTIPLATELETS: ICD-10-CM

## 2022-05-24 DIAGNOSIS — I25.10 ATHEROSCLEROTIC HEART DISEASE OF NATIVE CORONARY ARTERY WITHOUT ANGINA PECTORIS: ICD-10-CM

## 2022-05-24 DIAGNOSIS — R07.89 OTHER CHEST PAIN: ICD-10-CM

## 2022-05-24 DIAGNOSIS — Z95.5 PRESENCE OF CORONARY ANGIOPLASTY IMPLANT AND GRAFT: ICD-10-CM

## 2022-05-24 DIAGNOSIS — Z79.82 LONG TERM (CURRENT) USE OF ASPIRIN: ICD-10-CM

## 2022-05-24 DIAGNOSIS — Z95.5 PRESENCE OF CORONARY ANGIOPLASTY IMPLANT AND GRAFT: Chronic | ICD-10-CM

## 2022-05-24 DIAGNOSIS — M32.9 SYSTEMIC LUPUS ERYTHEMATOSUS, UNSPECIFIED: ICD-10-CM

## 2022-05-24 LAB
ALBUMIN SERPL ELPH-MCNC: 4.9 G/DL — SIGNIFICANT CHANGE UP (ref 3.5–5.2)
ALP SERPL-CCNC: 58 U/L — SIGNIFICANT CHANGE UP (ref 30–115)
ALT FLD-CCNC: 17 U/L — SIGNIFICANT CHANGE UP (ref 0–41)
ANION GAP SERPL CALC-SCNC: 13 MMOL/L — SIGNIFICANT CHANGE UP (ref 7–14)
AST SERPL-CCNC: 23 U/L — SIGNIFICANT CHANGE UP (ref 0–41)
BASOPHILS # BLD AUTO: 0.06 K/UL — SIGNIFICANT CHANGE UP (ref 0–0.2)
BASOPHILS NFR BLD AUTO: 1.1 % — HIGH (ref 0–1)
BILIRUB SERPL-MCNC: 0.2 MG/DL — SIGNIFICANT CHANGE UP (ref 0.2–1.2)
BUN SERPL-MCNC: 17 MG/DL — SIGNIFICANT CHANGE UP (ref 10–20)
CALCIUM SERPL-MCNC: 9.7 MG/DL — SIGNIFICANT CHANGE UP (ref 8.5–10.1)
CHLORIDE SERPL-SCNC: 104 MMOL/L — SIGNIFICANT CHANGE UP (ref 98–110)
CO2 SERPL-SCNC: 25 MMOL/L — SIGNIFICANT CHANGE UP (ref 17–32)
CREAT SERPL-MCNC: 0.8 MG/DL — SIGNIFICANT CHANGE UP (ref 0.7–1.5)
EGFR: 86 ML/MIN/1.73M2 — SIGNIFICANT CHANGE UP
EOSINOPHIL # BLD AUTO: 0.17 K/UL — SIGNIFICANT CHANGE UP (ref 0–0.7)
EOSINOPHIL NFR BLD AUTO: 3.1 % — SIGNIFICANT CHANGE UP (ref 0–8)
GLUCOSE SERPL-MCNC: 98 MG/DL — SIGNIFICANT CHANGE UP (ref 70–99)
HCT VFR BLD CALC: 39.4 % — SIGNIFICANT CHANGE UP (ref 37–47)
HGB BLD-MCNC: 13.1 G/DL — SIGNIFICANT CHANGE UP (ref 12–16)
IMM GRANULOCYTES NFR BLD AUTO: 0.2 % — SIGNIFICANT CHANGE UP (ref 0.1–0.3)
LYMPHOCYTES # BLD AUTO: 1.48 K/UL — SIGNIFICANT CHANGE UP (ref 1.2–3.4)
LYMPHOCYTES # BLD AUTO: 26.8 % — SIGNIFICANT CHANGE UP (ref 20.5–51.1)
MCHC RBC-ENTMCNC: 29.6 PG — SIGNIFICANT CHANGE UP (ref 27–31)
MCHC RBC-ENTMCNC: 33.2 G/DL — SIGNIFICANT CHANGE UP (ref 32–37)
MCV RBC AUTO: 89.1 FL — SIGNIFICANT CHANGE UP (ref 81–99)
MONOCYTES # BLD AUTO: 0.39 K/UL — SIGNIFICANT CHANGE UP (ref 0.1–0.6)
MONOCYTES NFR BLD AUTO: 7.1 % — SIGNIFICANT CHANGE UP (ref 1.7–9.3)
NEUTROPHILS # BLD AUTO: 3.41 K/UL — SIGNIFICANT CHANGE UP (ref 1.4–6.5)
NEUTROPHILS NFR BLD AUTO: 61.7 % — SIGNIFICANT CHANGE UP (ref 42.2–75.2)
NRBC # BLD: 0 /100 WBCS — SIGNIFICANT CHANGE UP (ref 0–0)
PLATELET # BLD AUTO: 256 K/UL — SIGNIFICANT CHANGE UP (ref 130–400)
POTASSIUM SERPL-MCNC: 4 MMOL/L — SIGNIFICANT CHANGE UP (ref 3.5–5)
POTASSIUM SERPL-SCNC: 4 MMOL/L — SIGNIFICANT CHANGE UP (ref 3.5–5)
PROT SERPL-MCNC: 7.4 G/DL — SIGNIFICANT CHANGE UP (ref 6–8)
RBC # BLD: 4.42 M/UL — SIGNIFICANT CHANGE UP (ref 4.2–5.4)
RBC # FLD: 12.5 % — SIGNIFICANT CHANGE UP (ref 11.5–14.5)
SODIUM SERPL-SCNC: 142 MMOL/L — SIGNIFICANT CHANGE UP (ref 135–146)
TROPONIN T SERPL-MCNC: <0.01 NG/ML — SIGNIFICANT CHANGE UP
TROPONIN T SERPL-MCNC: <0.01 NG/ML — SIGNIFICANT CHANGE UP
WBC # BLD: 5.52 K/UL — SIGNIFICANT CHANGE UP (ref 4.8–10.8)
WBC # FLD AUTO: 5.52 K/UL — SIGNIFICANT CHANGE UP (ref 4.8–10.8)

## 2022-05-24 PROCEDURE — 99285 EMERGENCY DEPT VISIT HI MDM: CPT | Mod: 25

## 2022-05-24 PROCEDURE — 70450 CT HEAD/BRAIN W/O DYE: CPT | Mod: 26,MA

## 2022-05-24 PROCEDURE — 93308 TTE F-UP OR LMTD: CPT | Mod: 26

## 2022-05-24 PROCEDURE — 71045 X-RAY EXAM CHEST 1 VIEW: CPT | Mod: 26

## 2022-05-24 PROCEDURE — 93010 ELECTROCARDIOGRAM REPORT: CPT

## 2022-05-24 NOTE — ED PROVIDER NOTE - ATTENDING CONTRIBUTION TO CARE
56-year-old female history of lupus CAD with 1 stents here for evaluation of chest pain and syncope.  Patient was having a argument with her granddaughter she became very emotional lower self to the ground and passed out.  Patient afterwards complaining of chest pain right hand tingling.  Of note the syncopal episode was not associate with seizure activity lasted about 3 minutes.  Patient reports that she feels very emotional related to the arguments and further view of the history shows the patient had a recent ischemic cardiac work-up that was unremarkable.  Hemoglobin agrees above exam with edition the patient is mildly emotional peering  Impression  Patient here with syncopal episode after verbal argument with her granddaughter that throughout the motion.  Followed by chest pain and right arm tingling.  Her symptoms have completely resolved here EKG x2 unremarkable troponin x2 unremarkable chest x-ray within normal limits bedside echo normal patient well-appearing eager for discharge we will follow-up as an outpatient.I suspect patient's syncopal episode was likely vasovagal in nature with a negative ischemic cardiac work-up as an outpatient and negative work-up here patient is stable for discharge.

## 2022-05-24 NOTE — ED PROVIDER NOTE - CLINICAL SUMMARY MEDICAL DECISION MAKING FREE TEXT BOX
56 y.o lady with hx of CAD, SLE presents with syncope  PE was non relevant for any focal neurologic deficits

## 2022-05-24 NOTE — ED PROVIDER NOTE - CARE PLAN
Principal Discharge DX:	Syncope  Assessment and plan of treatment:	you were diagnosed to have syncope most likely related to vasovagal syncope after having an emotional event   1

## 2022-05-24 NOTE — ED PROVIDER NOTE - NS ED ROS FT
REVIEW OF SYSTEMS:    CONSTITUTIONAL: No weakness, fevers or chills  EYES/ENT: No visual changes; + dizziness   NECK: No pain or stiffness  RESPIRATORY: No cough, wheezing, hemoptysis; No shortness of breath  CARDIOVASCULAR: + chest pain   GASTROINTESTINAL: No abdominal or epigastric pain. No nausea, vomiting, or hematemesis; No diarrhea or constipation. No melena or hematochezia.  GENITOURINARY: No dysuria, frequency or hematuria  NEUROLOGICAL: No numbness or weakness  SKIN: No itching, rashes

## 2022-05-24 NOTE — ED PROVIDER NOTE - PLAN OF CARE
you were diagnosed to have syncope most likely related to vasovagal syncope after having an emotional event

## 2022-05-24 NOTE — ED PROVIDER NOTE - PHYSICAL EXAMINATION
in NAD  no JVD  no carotid bruits  heart: RRR, normal s1s2  lungs: clear breath sounds bilaterally   abdomen: soft, NT, ND  neuro exam : 5/5 motor power, no sensory deficits, no dysmetria, no pronator drift  no KAM, + peripheral pulses

## 2022-05-24 NOTE — ED PROVIDER NOTE - PATIENT PORTAL LINK FT
You can access the FollowMyHealth Patient Portal offered by [facility] by registering at the following website: [email or web address]. By joining [employer]’s FollowMyHealth portal, you will also be able to view your health information using other applications (apps) compatible with our system.

## 2022-05-24 NOTE — ED ADULT NURSE NOTE - CHIEF COMPLAINT QUOTE
BIBA from home, patient was arguing with her granddaughter and then had sudden onset chest pain and then passed out, as per patients granddaughter she held on to something and lowered herself to the floor. Pt states she still feels dizzy

## 2022-05-24 NOTE — ED PROVIDER NOTE - OBJECTIVE STATEMENT
a 56 y.o lady with previous medical hx of SLE and CAD s/p 1 PRAVEEN last year came in for LOC that happened this afternoon   patient had an argument with her granddaughter and she got emotional and under stress, after that she starts having sob, chest squeezing pain then she collapsed  syncope lasts for 3 min, then she woke up w/o having any tonico clonic movements. she said that she never had similar episodes in the past. she denied any seizures like activity. no head trauma, no fall, no fever, or chills, no abdominal pain, no fever, no palpitations  last ischemic work up was negative in august 2021

## 2022-05-24 NOTE — ED PROVIDER NOTE - NSTIMEPROVIDERCAREINITIATE_GEN_ER
Patient with Lewy Body Dementia admitted for weakness/altered baseline. On admission 0/4 SIRS. UA and CXR without signs of infection. COVID negative. CTH no acute infarct or bleed seen. CT cervical spine no acute fracture. XRAY of lumbar and thoracic spine no acute fracture. BCx2 were collected, 1 BCx showed GPC final result contaminant. Repeat BCx were obtained, and patient was started on IV Vancomycin, pharmacy consulted. Repeat Bcx NGTD. TTE ordered given patient with AICD/PPM, no signs of vegetation, TTE relatively unchanged. Stool studies NGTD, C. Diff negative. Given functional and mental decline, goals of care discussed with family who wish to further discuss hospice/end of life care with palliative care (pall care has been following outpt). Following discussion with palliative care, family decided to purse SNF. Discharged in stable condition.   
24-May-2022 16:03

## 2022-05-24 NOTE — ED ADULT TRIAGE NOTE - CHIEF COMPLAINT QUOTE
BIBA from home, patient was arguing with her granddaughter and then had sudden onset chest pain and then passed out, as per patients granddaughter she held on to something and lowered herself to the floor.

## 2022-05-24 NOTE — ED ADULT NURSE NOTE - CHIEF COMPLAINT
The patient is a 56y Female complaining of syncope. pt states had an argument with her grand-daughter and she began to have CP and SOB and became lightheaded. pt states she lowered herself to the floor.

## 2022-07-06 NOTE — ED ADULT NURSE NOTE - NS PRO AD NO ADVANCE DIRECTIVE
COVID-19: What to Do if You Are Sick      If you test positive and are an older adult or someone who is at high risk of getting very sick from COVID-19, treatment may be available. Contact a healthcare provider right away after a positive test to determine if you are eligible, even if your symptoms are mild right now. You can also visit a Test to Treat location and, if eligible, receive a prescription from a provider. Don't delay: Treatment must be started within the first few days to be effective.    If you have a fever, cough, or other symptoms, you might have COVID-19. Most people have mild illness and are able to recover at home. If you are sick:  •Keep track of your symptoms.      •If you have an emergency warning sign (including trouble breathing), call 911.        Steps to help prevent the spread of COVID-19 if you are sick    If you are sick with COVID-19 or think you might have COVID-19, follow the steps below to care for yourself and to help protect other people in your home and community.    Stay home except to get medical care     • Stay home. Most people with COVID-19 have mild illness and can recover at home without medical care. Do not leave your home, except to get medical care. Do not visit public areas and do not go to places where you are unable to wear a mask.      • Take care of yourself. Get rest and stay hydrated. Take over-the-counter medicines, such as acetaminophen, to help you feel better.      • Stay in touch with your doctor. Call before you get medical care. Be sure to get care if you have trouble breathing, or have any other emergency warning signs, or if you think it is an emergency.      • Avoid public transportation, ride-sharing, or taxis if possible.      Get tested     •If you have symptoms of COVID-19, get tested. While waiting for test results, stay away from others, including staying apart from those living in your household.      • Get tested as soon as possible after your symptoms start. Treatments may be available for people with COVID-19 who are at risk for becoming very sick. Don't delay: Treatment must be started early to be effective—some treatments must begin within 5 days of your first symptoms. Contact your healthcare provider right away if your test result is positive to determine if you are eligible.      • Self-tests are one of several options for testing for the virus that causes COVID-19 and may be more convenient than laboratory-based tests and point-of-care tests. Ask your healthcare provider or your local health department if you need help interpreting your test results.      •You can visit your [facility], [facility], local, and [place] health department's website to look for the latest local information on testing sites.      Separate yourself from other people     As much as possible, stay in a specific room and away from other people and pets in your home. If possible, you should use a separate bathroom. If you need to be around other people or animals in or outside of the home, wear a well-fitting mask.    Tell your close contacts that they may have been exposed to COVID-19. An infected person can spread COVID-19 starting 48 hours (or 2 days) before the person has any symptoms or tests positive. By letting your close contacts know they may have been exposed to COVID-19, you are helping to protect everyone.  •See COVID-19 and Animals if you have questions about pets.      •If you are diagnosed with COVID-19, someone from the health department may call you. Answer the call to slow the spread.      Monitor your symptoms    •Symptoms of COVID-19 include fever, cough, or other symptoms.       •Follow care instructions from your healthcare provider and local health department. Your local health authorities may give instructions on checking your symptoms and reporting information.      When to seek emergency medical attention    Look for emergency warning signs* for COVID-19. If someone is showing any of these signs, seek emergency medical care immediately:  •Trouble breathing      •Persistent pain or pressure in the chest      •New confusion      •Inability to wake or stay awake      •Pale, gray, or blue-colored skin, lips, or nail beds, depending on skin tone      *This list is not all possible symptoms. Please call your medical provider for any other symptoms that are severe or concerning to you.    Call 911 or call ahead to your local emergency facility: Notify the  that you are seeking care for someone who has or may have COVID-19.    Call ahead before visiting your doctor    •Call ahead. Many medical visits for routine care are being postponed or done by phone or telemedicine.      •If you have a medical appointment that cannot be postponed, call your doctor's office, and tell them you have or may have COVID-19. This will help the office protect themselves and other patients.      If you are sick, wear a well-fitting mask    •You should wear a mask if you must be around other people or animals, including pets (even at home).      •Wear a mask with the best fit, protection, and comfort for you.      •You don't need to wear the mask if you are alone. If you can't put on a mask (because of trouble breathing, for example), cover your coughs and sneezes in some other way. Try to stay at least 6 feet away from other people. This will help protect the people around you.      •Masks should not be placed on young children under age 2 years, anyone who has trouble breathing, or anyone who is not able to remove the mask without help.      Cover your coughs and sneezes    •Cover your mouth and nose with a tissue when you cough or sneeze.      •Throw away used tissues in a lined trash can.      •Immediately wash your hands with soap and water for at least 20 seconds. If soap and water are not available, clean your hands with an alcohol-based hand  that contains at least 60% alcohol.      Clean your hands often    •Wash your hands often with soap and water for at least 20 seconds. This is especially important after blowing your nose, coughing, or sneezing; going to the bathroom; and before eating or preparing food.      •Use hand  if soap and water are not available. Use an alcohol-based hand  with at least 60% alcohol, covering all surfaces of your hands and rubbing them together until they feel dry.      •Soap and water are the best option, especially if hands are visibly dirty.       •Avoid touching your eyes, nose, and mouth with unwashed hands.      •Handwashing Tips      Avoid sharing personal household items    •Do not share dishes, drinking glasses, cups, eating utensils, towels, or bedding with other people in your home.      •Wash these items thoroughly after using them with soap and water or put in the .      Clean surfaces in your home regularly    •Clean and disinfect high-touch surfaces (for example, doorknobs, tables, handles, light switches, and countertops) in your "sick room" and bathroom. In shared spaces, you should clean and disinfect surfaces and items after each use by the person who is ill.      •If you are sick and cannot clean, a caregiver or other person should only clean and disinfect the area around you (such as your bedroom and bathroom) on an as needed basis. Your caregiver/other person should wait as long as possible (at least several hours) and wear a mask before entering, cleaning, and disinfecting shared spaces that you use.      •Clean and disinfect areas that may have blood, stool, or body fluids on them.    •Use household  and disinfectants. Clean visible dirty surfaces with household  containing soap or detergent. Then, use a household disinfectant.  •Use a product from EPA's List N: Disinfectants for Coronavirus (COVID-19).      •Be sure to follow the instructions on the label to ensure safe and effective use of the product. Many products recommend keeping the surface wet with a disinfectant for a certain period of time (look at "contact time" on the product label).      •You may also need to wear personal protective equipment, such as gloves, depending on the directions on the product label.      •Immediately after disinfecting, wash your hands with soap and water for 20 seconds.      •For completed guidance on cleaning and disinfecting your home, visit Complete Disinfection Guidance.        Take steps to improve ventilation at home    •Improve ventilation (air flow) at home to help prevent from spreading COVID-19 to other people in your household.      •Clear out COVID-19 virus particles in the air by opening windows, using air filters, and turning on fans in your home.      •Use this interactive tool to learn how to improve air flow in your home.        When you can be around others after being sick with COVID-19    Deciding when you can be around others is different for different situations. Find out when you can safely end home isolation. No

## 2022-09-09 NOTE — ASU PATIENT PROFILE, ADULT - NS PRO INFO GIVEN TO
patient Pt is a 58yo Female with a PMH including GERD & DM who presents to the hospital s/p drinking bleach approximately 1 hour prior to arrival.     PLAN:  -No acute ENT intervention at this time  -FFL without evidence of caustic injury  -GI consult  -PPI  -Pain control prn  -Follow-up outpatient with ENT prn

## 2022-09-23 LAB
ALBUMIN SERPL ELPH-MCNC: 5.4 G/DL
ALP BLD-CCNC: 59 U/L
ALT SERPL-CCNC: 16 U/L
ANION GAP SERPL CALC-SCNC: 13 MMOL/L
AST SERPL-CCNC: 21 U/L
BASOPHILS # BLD AUTO: 0.08 K/UL
BASOPHILS NFR BLD AUTO: 1.4 %
BILIRUB SERPL-MCNC: 0.6 MG/DL
BUN SERPL-MCNC: 15 MG/DL
CALCIUM SERPL-MCNC: 10.7 MG/DL
CHLORIDE SERPL-SCNC: 100 MMOL/L
CHOLEST SERPL-MCNC: 205 MG/DL
CK SERPL-CCNC: 165 U/L
CO2 SERPL-SCNC: 27 MMOL/L
CREAT SERPL-MCNC: 0.9 MG/DL
EGFR: 75 ML/MIN/1.73M2
EOSINOPHIL # BLD AUTO: 0.2 K/UL
EOSINOPHIL NFR BLD AUTO: 3.6 %
GLUCOSE SERPL-MCNC: 85 MG/DL
HCT VFR BLD CALC: 47.5 %
HDLC SERPL-MCNC: 67 MG/DL
HGB BLD-MCNC: 15 G/DL
IMM GRANULOCYTES NFR BLD AUTO: 0.4 %
LDLC SERPL CALC-MCNC: 113 MG/DL
LYMPHOCYTES # BLD AUTO: 2.03 K/UL
LYMPHOCYTES NFR BLD AUTO: 36.5 %
MAN DIFF?: NORMAL
MCHC RBC-ENTMCNC: 30.1 PG
MCHC RBC-ENTMCNC: 31.6 G/DL
MCV RBC AUTO: 95.2 FL
MONOCYTES # BLD AUTO: 0.34 K/UL
MONOCYTES NFR BLD AUTO: 6.1 %
NEUTROPHILS # BLD AUTO: 2.89 K/UL
NEUTROPHILS NFR BLD AUTO: 52 %
NONHDLC SERPL-MCNC: 138 MG/DL
PLATELET # BLD AUTO: 265 K/UL
POTASSIUM SERPL-SCNC: 4.5 MMOL/L
PROT SERPL-MCNC: 7.9 G/DL
RBC # BLD: 4.99 M/UL
RBC # FLD: 13 %
SODIUM SERPL-SCNC: 140 MMOL/L
TRIGL SERPL-MCNC: 125 MG/DL
TSH SERPL-ACNC: 0.71 UIU/ML
WBC # FLD AUTO: 5.56 K/UL

## 2022-09-30 ENCOUNTER — APPOINTMENT (OUTPATIENT)
Dept: CARDIOLOGY | Facility: CLINIC | Age: 57
End: 2022-09-30

## 2022-09-30 PROCEDURE — 93000 ELECTROCARDIOGRAM COMPLETE: CPT

## 2022-09-30 PROCEDURE — 99214 OFFICE O/P EST MOD 30 MIN: CPT | Mod: 25

## 2022-09-30 RX ORDER — OXYCODONE HYDROCHLORIDE AND ACETAMINOPHEN 10; 325 MG/1; MG/1
10-325 TABLET ORAL 3 TIMES DAILY
Refills: 0 | Status: ACTIVE | COMMUNITY

## 2022-09-30 NOTE — HISTORY OF PRESENT ILLNESS
[FreeTextEntry1] : 58 yo female with pmhx as below is here for a f/up visit\par 9/20 admitted to [facility] with cp, s/p cath/iFR guided pci of LAD with johanna\par doing well \par recent dx of lupus\par no active cv complains\par et is stable\par ros is otherwise as below

## 2022-09-30 NOTE — ASSESSMENT
[FreeTextEntry1] : 56 yo female with pmhx and presentation as above\par cad/pci of lad with johanna\par dyslipidemia\par asthma\par cont all meds\par repeat labs\par stress echo - low risk\par resume statin\par aggressive risk modif\par diet and act as tolerated\par rtc 9-12 months

## 2022-11-28 ENCOUNTER — EMERGENCY (EMERGENCY)
Facility: HOSPITAL | Age: 57
LOS: 0 days | Discharge: HOME | End: 2022-11-28
Attending: EMERGENCY MEDICINE | Admitting: EMERGENCY MEDICINE

## 2022-11-28 VITALS
SYSTOLIC BLOOD PRESSURE: 100 MMHG | HEART RATE: 62 BPM | TEMPERATURE: 98 F | DIASTOLIC BLOOD PRESSURE: 52 MMHG | OXYGEN SATURATION: 100 % | RESPIRATION RATE: 18 BRPM

## 2022-11-28 DIAGNOSIS — J45.909 UNSPECIFIED ASTHMA, UNCOMPLICATED: ICD-10-CM

## 2022-11-28 DIAGNOSIS — Z98.890 OTHER SPECIFIED POSTPROCEDURAL STATES: Chronic | ICD-10-CM

## 2022-11-28 DIAGNOSIS — Z95.5 PRESENCE OF CORONARY ANGIOPLASTY IMPLANT AND GRAFT: Chronic | ICD-10-CM

## 2022-11-28 DIAGNOSIS — Z95.5 PRESENCE OF CORONARY ANGIOPLASTY IMPLANT AND GRAFT: ICD-10-CM

## 2022-11-28 DIAGNOSIS — I25.10 ATHEROSCLEROTIC HEART DISEASE OF NATIVE CORONARY ARTERY WITHOUT ANGINA PECTORIS: ICD-10-CM

## 2022-11-28 DIAGNOSIS — K08.531 FRACTURED DENTAL RESTORATIVE MATERIAL WITH LOSS OF MATERIAL: ICD-10-CM

## 2022-11-28 DIAGNOSIS — K08.89 OTHER SPECIFIED DISORDERS OF TEETH AND SUPPORTING STRUCTURES: ICD-10-CM

## 2022-11-28 PROCEDURE — 99284 EMERGENCY DEPT VISIT MOD MDM: CPT

## 2022-11-28 NOTE — ED PROVIDER NOTE - NSTIMEPROVIDERCAREINITIATE_GEN_ER
Patient evaluated by Dr. Lawrence and ICU PA. Amiodarone infusing per order. Report given to Vijaya BARAJAS in ICU. Pending transport ot transport to ICU. Leeann BARAJAS 28-Nov-2022 08:22
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
## 2022-11-28 NOTE — ED PROVIDER NOTE - PHYSICAL EXAMINATION
Gen: NAD, AOx3  Head: NCAT  HEENT: tooth #2 fractured w/ TTP, PERRL, oral mucosa moist, normal conjunctiva, oropharynx clear without exudate or erythema  Lung: CTAB, no respiratory distress, no wheezing, rales, rhonchi  CV: normal s1/s2, rrr, Normal perfusion, pulses 2+ throughout  MSK: No edema, no visible deformities, full range of motion in all 4 extremities  Neuro: No focal neurologic deficits  Skin: No rash   Psych: normal affect

## 2022-11-28 NOTE — ED PROVIDER NOTE - NS ED ATTENDING STATEMENT MOD
I have seen and examined this patient and fully participated in the care of this patient as the teaching attending.  The service was shared with the JOSHUA.  I reviewed and verified the documentation and independently performed the documented:

## 2022-11-28 NOTE — ED PROVIDER NOTE - OBJECTIVE STATEMENT
58 yo female with a pmh of CAD and SLE presents c/o R upper dental pain after fracturing her tooth 3 days prior. pt denies any other symptoms including fevers, chill, headache, recent illness/travel, cough, abdominal pain, chest pain, or SOB.

## 2022-12-17 ENCOUNTER — RX RENEWAL (OUTPATIENT)
Age: 57
End: 2022-12-17

## 2023-01-12 NOTE — ED PROVIDER NOTE - COVID-19 RESULT
NEGATIVE Bcc Micronodular Histology Text: There were numerous small/micronodular aggregates of palisaded basaloid cells with peripheral clefting

## 2023-02-09 NOTE — ED ADULT TRIAGE NOTE - SOURCE OF INFORMATION
Patient
GOAL: Pt will increase static/ dynamic standing balance to Good- with rolling walker to improve safety with functional activities in 4 weeks.

## 2023-02-28 ENCOUNTER — EMERGENCY (EMERGENCY)
Facility: HOSPITAL | Age: 58
LOS: 0 days | Discharge: HOME | End: 2023-03-01
Attending: EMERGENCY MEDICINE
Payer: MEDICARE

## 2023-02-28 VITALS
HEART RATE: 90 BPM | TEMPERATURE: 98 F | DIASTOLIC BLOOD PRESSURE: 73 MMHG | RESPIRATION RATE: 18 BRPM | OXYGEN SATURATION: 98 % | WEIGHT: 119.93 LBS | SYSTOLIC BLOOD PRESSURE: 128 MMHG | HEIGHT: 61 IN

## 2023-02-28 DIAGNOSIS — Z79.02 LONG TERM (CURRENT) USE OF ANTITHROMBOTICS/ANTIPLATELETS: ICD-10-CM

## 2023-02-28 DIAGNOSIS — Z98.890 OTHER SPECIFIED POSTPROCEDURAL STATES: Chronic | ICD-10-CM

## 2023-02-28 DIAGNOSIS — Z79.82 LONG TERM (CURRENT) USE OF ASPIRIN: ICD-10-CM

## 2023-02-28 DIAGNOSIS — M79.651 PAIN IN RIGHT THIGH: ICD-10-CM

## 2023-02-28 DIAGNOSIS — M54.50 LOW BACK PAIN, UNSPECIFIED: ICD-10-CM

## 2023-02-28 DIAGNOSIS — M79.652 PAIN IN LEFT THIGH: ICD-10-CM

## 2023-02-28 DIAGNOSIS — M32.9 SYSTEMIC LUPUS ERYTHEMATOSUS, UNSPECIFIED: ICD-10-CM

## 2023-02-28 DIAGNOSIS — J45.909 UNSPECIFIED ASTHMA, UNCOMPLICATED: ICD-10-CM

## 2023-02-28 DIAGNOSIS — I25.10 ATHEROSCLEROTIC HEART DISEASE OF NATIVE CORONARY ARTERY WITHOUT ANGINA PECTORIS: ICD-10-CM

## 2023-02-28 DIAGNOSIS — Z95.5 PRESENCE OF CORONARY ANGIOPLASTY IMPLANT AND GRAFT: Chronic | ICD-10-CM

## 2023-02-28 DIAGNOSIS — G89.29 OTHER CHRONIC PAIN: ICD-10-CM

## 2023-02-28 DIAGNOSIS — Z95.5 PRESENCE OF CORONARY ANGIOPLASTY IMPLANT AND GRAFT: ICD-10-CM

## 2023-02-28 PROCEDURE — 99284 EMERGENCY DEPT VISIT MOD MDM: CPT

## 2023-02-28 PROCEDURE — 99283 EMERGENCY DEPT VISIT LOW MDM: CPT

## 2023-03-01 RX ORDER — METHOCARBAMOL 500 MG/1
1000 TABLET, FILM COATED ORAL ONCE
Refills: 0 | Status: COMPLETED | OUTPATIENT
Start: 2023-03-01 | End: 2023-03-01

## 2023-03-01 RX ADMIN — METHOCARBAMOL 1000 MILLIGRAM(S): 500 TABLET, FILM COATED ORAL at 00:39

## 2023-03-01 NOTE — ED PROVIDER NOTE - PHYSICAL EXAMINATION
CONSTITUTIONAL: Well-appearing; well-nourished; in no apparent distress.   EYES: PERRL; EOM intact.   CARDIOVASCULAR: Normal S1, S2; no murmurs, rubs, or gallops.   RESPIRATORY: Normal chest excursion with respiration; breath sounds clear and equal bilaterally; no wheezes, rhonchi, or rales.  GI/: Normal bowel sounds; non-distended; non-tender; no palpable organomegaly.   MS: No calf swelling and tenderness.  No midline tenderness to neck and back.  Negative straight leg raise.  No focal tenderness of bilateral thigh.  Pulses and temperature are equal to bilateral lower extremities.  Ambulating with normal and steady gait.  SKIN: No rash to bilateral thighs.  NEURO/PSYCH: A & O x 4; grossly unremarkable.

## 2023-03-01 NOTE — ED PROVIDER NOTE - NS ED ATTENDING STATEMENT MOD
This was a shared visit with the JOSHUA. I reviewed and verified the documentation and independently performed the documented:

## 2023-03-01 NOTE — ED PROVIDER NOTE - ATTENDING APP SHARED VISIT CONTRIBUTION OF CARE
57F pmh SLE, cad/stent p/w bl LE pain since 5am. Woke up with aching pain started in L thigh, then felt to R thigh. No falls or trauma. No specific aggrav/allev factors. Was concerned she might have a blood clot. No h/o VTE. Denies any dizziness, lower abd pain, back pain, saddle anesthesia, b/b incontinence, focal numbness or weakness. No recent travel/surgery/immobilization/hormone use.     PE:  nad  skin warm, dry  ncat  neck supple  rrr nl s1s2 no mrg  ctab no wrr  abd soft ntnd no palpable masses no rgr  back no midline or paraspinal ttp no cvat  ext- bl thigh atx, no erythema, no focal ttp, full rom/strength/sensation intact throughout, dpi; UE exam nml  neuro aaox3, cn 2-12 intact bl no nystagmus or facial droop, 5/5 strength x 4 no drift, gross sensation intact, finger-nose nl, gait nl

## 2023-03-01 NOTE — ED PROVIDER NOTE - OBJECTIVE STATEMENT
57 years old female history of lupus, CAD status post stent, chronic lower back pain present complaint bilateral thigh pain since this morning pain.  Reports the pain is different from her usual lupus joint pain.  Pain mostly to the lateral aspect of her right thigh and started having pain to the site of her left thigh so she got concerned to come to ED for evaluations.  Pain is also throbbing and burning-like.  Pain improved after she took a Percocet earlier tonight.  Denies fall or injury.  Denies heavy lifting or overexertion.  Further denies fever, chills, chest pain, shortness of breath, abdominal pain, numbness and weakness to bilateral lower extremities, rash to lower extremities.

## 2023-03-01 NOTE — ED PROVIDER NOTE - PATIENT PORTAL LINK FT
You can access the FollowMyHealth Patient Portal offered by [facility] by registering at the following website: [email or web address]. By joining [facility]’s FollowMyHealth portal, you will also be able to view your health information using other applications (apps) compatible with our system.

## 2023-03-01 NOTE — ED PROVIDER NOTE - CLINICAL SUMMARY MEDICAL DECISION MAKING FREE TEXT BOX
Labs and EKG were ordered and reviewed, where indicated.  Imaging was ordered and reviewed by me, where indicated.  Appropriate medications for patient's presenting complaints were ordered and effects were reassessed, where indicated.  Patient's records (prior hospital, ED visit, and/or nursing home note) were reviewed, if available.  Additional history was obtained from EMS, family, and/or PCP (where available).  Escalation to admission/observation was considered.  However patient feels much better and patient/parent is comfortable with discharge.  Appropriate follow-up was arranged.    bl thigh pain - atx, phys exam nml - analgesia, pt advised venous duplex currently unavailable until 7am, offered d-dimer testing however pt declined, prefers to f/u with PCP Dr. Gayle - strict return precautions discussed, rec outpt pcp f/u

## 2023-04-21 ENCOUNTER — APPOINTMENT (OUTPATIENT)
Dept: CARDIOLOGY | Facility: CLINIC | Age: 58
End: 2023-04-21
Payer: MEDICARE

## 2023-04-21 VITALS
BODY MASS INDEX: 22.84 KG/M2 | TEMPERATURE: 98.2 F | DIASTOLIC BLOOD PRESSURE: 64 MMHG | HEART RATE: 71 BPM | HEIGHT: 61 IN | SYSTOLIC BLOOD PRESSURE: 94 MMHG | WEIGHT: 121 LBS

## 2023-04-21 LAB
ALBUMIN SERPL ELPH-MCNC: 4.8 G/DL
ALP BLD-CCNC: 64 U/L
ALT SERPL-CCNC: 17 U/L
ANION GAP SERPL CALC-SCNC: 13 MMOL/L
AST SERPL-CCNC: 18 U/L
BASOPHILS # BLD AUTO: 0.06 K/UL
BASOPHILS NFR BLD AUTO: 1.2 %
BILIRUB SERPL-MCNC: 0.5 MG/DL
BUN SERPL-MCNC: 21 MG/DL
CALCIUM SERPL-MCNC: 10.3 MG/DL
CHLORIDE SERPL-SCNC: 104 MMOL/L
CHOLEST SERPL-MCNC: 170 MG/DL
CO2 SERPL-SCNC: 24 MMOL/L
CREAT SERPL-MCNC: 0.8 MG/DL
EGFR: 86 ML/MIN/1.73M2
EOSINOPHIL # BLD AUTO: 0.15 K/UL
EOSINOPHIL NFR BLD AUTO: 3.1 %
GLUCOSE SERPL-MCNC: 75 MG/DL
HCT VFR BLD CALC: 41.3 %
HDLC SERPL-MCNC: 63 MG/DL
HGB BLD-MCNC: 13.6 G/DL
IMM GRANULOCYTES NFR BLD AUTO: 0.4 %
LDLC SERPL CALC-MCNC: 88 MG/DL
LYMPHOCYTES # BLD AUTO: 1.36 K/UL
LYMPHOCYTES NFR BLD AUTO: 27.8 %
MAN DIFF?: NORMAL
MCHC RBC-ENTMCNC: 30.8 PG
MCHC RBC-ENTMCNC: 32.9 G/DL
MCV RBC AUTO: 93.4 FL
MONOCYTES # BLD AUTO: 0.3 K/UL
MONOCYTES NFR BLD AUTO: 6.1 %
NEUTROPHILS # BLD AUTO: 3 K/UL
NEUTROPHILS NFR BLD AUTO: 61.4 %
NONHDLC SERPL-MCNC: 107 MG/DL
PLATELET # BLD AUTO: 228 K/UL
POTASSIUM SERPL-SCNC: 4.5 MMOL/L
PROT SERPL-MCNC: 7.3 G/DL
RBC # BLD: 4.42 M/UL
RBC # FLD: 12.8 %
SODIUM SERPL-SCNC: 141 MMOL/L
TRIGL SERPL-MCNC: 94 MG/DL
WBC # FLD AUTO: 4.89 K/UL

## 2023-04-21 PROCEDURE — 99214 OFFICE O/P EST MOD 30 MIN: CPT | Mod: 25

## 2023-04-21 PROCEDURE — 93000 ELECTROCARDIOGRAM COMPLETE: CPT

## 2023-04-21 RX ORDER — HYDROXYCHLOROQUINE SULFATE 100 MG/1
100 TABLET ORAL TWICE DAILY
Refills: 0 | Status: DISCONTINUED | COMMUNITY
End: 2023-04-21

## 2023-04-21 NOTE — HISTORY OF PRESENT ILLNESS
[FreeTextEntry1] : 56 yo female with pmhx as below is here for a f/up visit\par 9/20 admitted to [facility] with cp, s/p cath/iFR guided pci of LAD with johanna\par doing well \par recent dx of lupus\par no active cv complains\par et is stable\par ros is otherwise as below

## 2023-04-21 NOTE — ASSESSMENT
[FreeTextEntry1] : 56 yo female with pmhx and presentation as above\par cad/pci of lad with johanna\par dyslipidemia\par asthma\par cont all meds\par repeat labs reviewed, all at goal\par stress echo - low risk\par cont statin\par aggressive risk modif\par diet and act as tolerated\par rtc 9-12 months

## 2023-04-21 NOTE — PHYSICAL EXAM
[General Appearance - Well Developed] : well developed [Normal Appearance] : normal appearance [Well Groomed] : well groomed [General Appearance - Well Nourished] : well nourished [No Deformities] : no deformities [General Appearance - In No Acute Distress] : no acute distress [Normal Oral Mucosa] : normal oral mucosa [No Oral Pallor] : no oral pallor [No Oral Cyanosis] : no oral cyanosis [Normal Jugular Venous A Waves Present] : normal jugular venous A waves present [Normal Jugular Venous V Waves Present] : normal jugular venous V waves present [No Jugular Venous Lyons A Waves] : no jugular venous lyons A waves [Respiration, Rhythm And Depth] : normal respiratory rhythm and effort [Exaggerated Use Of Accessory Muscles For Inspiration] : no accessory muscle use [Auscultation Breath Sounds / Voice Sounds] : lungs were clear to auscultation bilaterally [Heart Rate And Rhythm] : heart rate and rhythm were normal [Heart Sounds] : normal S1 and S2 [Murmurs] : no murmurs present [Abdomen Soft] : soft [Abdomen Tenderness] : non-tender [Abdomen Mass (___ Cm)] : no abdominal mass palpated [Nail Clubbing] : no clubbing of the fingernails [Cyanosis, Localized] : no localized cyanosis [Petechial Hemorrhages (___cm)] : no petechial hemorrhages [Skin Color & Pigmentation] : normal skin color and pigmentation [] : no ischemic changes [Oriented To Time, Place, And Person] : oriented to person, place, and time

## 2023-05-05 ENCOUNTER — NON-APPOINTMENT (OUTPATIENT)
Age: 58
End: 2023-05-05

## 2023-09-24 ENCOUNTER — RX RENEWAL (OUTPATIENT)
Age: 58
End: 2023-09-24

## 2023-09-24 RX ORDER — ATORVASTATIN CALCIUM 20 MG/1
20 TABLET, FILM COATED ORAL
Qty: 90 | Refills: 3 | Status: ACTIVE | COMMUNITY
Start: 2022-09-30 | End: 1900-01-01

## 2023-11-12 ENCOUNTER — INPATIENT (INPATIENT)
Facility: HOSPITAL | Age: 58
LOS: 0 days | Discharge: LEFT BEFORE DISCHARGE | DRG: 313 | End: 2023-11-13
Attending: INTERNAL MEDICINE | Admitting: STUDENT IN AN ORGANIZED HEALTH CARE EDUCATION/TRAINING PROGRAM
Payer: MEDICARE

## 2023-11-12 VITALS
TEMPERATURE: 98 F | WEIGHT: 125 LBS | DIASTOLIC BLOOD PRESSURE: 75 MMHG | RESPIRATION RATE: 18 BRPM | HEIGHT: 63 IN | HEART RATE: 111 BPM | SYSTOLIC BLOOD PRESSURE: 136 MMHG | OXYGEN SATURATION: 97 %

## 2023-11-12 DIAGNOSIS — R07.9 CHEST PAIN, UNSPECIFIED: ICD-10-CM

## 2023-11-12 DIAGNOSIS — Z98.890 OTHER SPECIFIED POSTPROCEDURAL STATES: Chronic | ICD-10-CM

## 2023-11-12 DIAGNOSIS — Z95.5 PRESENCE OF CORONARY ANGIOPLASTY IMPLANT AND GRAFT: Chronic | ICD-10-CM

## 2023-11-12 LAB
ALBUMIN SERPL ELPH-MCNC: 4.9 G/DL — SIGNIFICANT CHANGE UP (ref 3.5–5.2)
ALBUMIN SERPL ELPH-MCNC: 4.9 G/DL — SIGNIFICANT CHANGE UP (ref 3.5–5.2)
ALP SERPL-CCNC: 67 U/L — SIGNIFICANT CHANGE UP (ref 30–115)
ALP SERPL-CCNC: 67 U/L — SIGNIFICANT CHANGE UP (ref 30–115)
ALT FLD-CCNC: 17 U/L — SIGNIFICANT CHANGE UP (ref 0–41)
ALT FLD-CCNC: 17 U/L — SIGNIFICANT CHANGE UP (ref 0–41)
ANION GAP SERPL CALC-SCNC: 11 MMOL/L — SIGNIFICANT CHANGE UP (ref 7–14)
ANION GAP SERPL CALC-SCNC: 11 MMOL/L — SIGNIFICANT CHANGE UP (ref 7–14)
AST SERPL-CCNC: 19 U/L — SIGNIFICANT CHANGE UP (ref 0–41)
AST SERPL-CCNC: 19 U/L — SIGNIFICANT CHANGE UP (ref 0–41)
BASOPHILS # BLD AUTO: 0.05 K/UL — SIGNIFICANT CHANGE UP (ref 0–0.2)
BASOPHILS # BLD AUTO: 0.05 K/UL — SIGNIFICANT CHANGE UP (ref 0–0.2)
BASOPHILS NFR BLD AUTO: 0.9 % — SIGNIFICANT CHANGE UP (ref 0–1)
BASOPHILS NFR BLD AUTO: 0.9 % — SIGNIFICANT CHANGE UP (ref 0–1)
BILIRUB SERPL-MCNC: 0.6 MG/DL — SIGNIFICANT CHANGE UP (ref 0.2–1.2)
BILIRUB SERPL-MCNC: 0.6 MG/DL — SIGNIFICANT CHANGE UP (ref 0.2–1.2)
BUN SERPL-MCNC: 16 MG/DL — SIGNIFICANT CHANGE UP (ref 10–20)
BUN SERPL-MCNC: 16 MG/DL — SIGNIFICANT CHANGE UP (ref 10–20)
CALCIUM SERPL-MCNC: 10 MG/DL — SIGNIFICANT CHANGE UP (ref 8.4–10.5)
CALCIUM SERPL-MCNC: 10 MG/DL — SIGNIFICANT CHANGE UP (ref 8.4–10.5)
CHLORIDE SERPL-SCNC: 105 MMOL/L — SIGNIFICANT CHANGE UP (ref 98–110)
CHLORIDE SERPL-SCNC: 105 MMOL/L — SIGNIFICANT CHANGE UP (ref 98–110)
CK MB CFR SERPL CALC: 2.1 NG/ML — SIGNIFICANT CHANGE UP (ref 0.6–6.3)
CK MB CFR SERPL CALC: 2.1 NG/ML — SIGNIFICANT CHANGE UP (ref 0.6–6.3)
CK SERPL-CCNC: 154 U/L — SIGNIFICANT CHANGE UP (ref 0–225)
CK SERPL-CCNC: 154 U/L — SIGNIFICANT CHANGE UP (ref 0–225)
CO2 SERPL-SCNC: 26 MMOL/L — SIGNIFICANT CHANGE UP (ref 17–32)
CO2 SERPL-SCNC: 26 MMOL/L — SIGNIFICANT CHANGE UP (ref 17–32)
CREAT SERPL-MCNC: 0.7 MG/DL — SIGNIFICANT CHANGE UP (ref 0.7–1.5)
CREAT SERPL-MCNC: 0.7 MG/DL — SIGNIFICANT CHANGE UP (ref 0.7–1.5)
D DIMER BLD IA.RAPID-MCNC: <150 NG/ML DDU — SIGNIFICANT CHANGE UP
D DIMER BLD IA.RAPID-MCNC: <150 NG/ML DDU — SIGNIFICANT CHANGE UP
EGFR: 100 ML/MIN/1.73M2 — SIGNIFICANT CHANGE UP
EGFR: 100 ML/MIN/1.73M2 — SIGNIFICANT CHANGE UP
EOSINOPHIL # BLD AUTO: 0.28 K/UL — SIGNIFICANT CHANGE UP (ref 0–0.7)
EOSINOPHIL # BLD AUTO: 0.28 K/UL — SIGNIFICANT CHANGE UP (ref 0–0.7)
EOSINOPHIL NFR BLD AUTO: 5.2 % — SIGNIFICANT CHANGE UP (ref 0–8)
EOSINOPHIL NFR BLD AUTO: 5.2 % — SIGNIFICANT CHANGE UP (ref 0–8)
GLUCOSE SERPL-MCNC: 94 MG/DL — SIGNIFICANT CHANGE UP (ref 70–99)
GLUCOSE SERPL-MCNC: 94 MG/DL — SIGNIFICANT CHANGE UP (ref 70–99)
HCT VFR BLD CALC: 39.8 % — SIGNIFICANT CHANGE UP (ref 37–47)
HCT VFR BLD CALC: 39.8 % — SIGNIFICANT CHANGE UP (ref 37–47)
HGB BLD-MCNC: 14 G/DL — SIGNIFICANT CHANGE UP (ref 12–16)
HGB BLD-MCNC: 14 G/DL — SIGNIFICANT CHANGE UP (ref 12–16)
IMM GRANULOCYTES NFR BLD AUTO: 0.4 % — HIGH (ref 0.1–0.3)
IMM GRANULOCYTES NFR BLD AUTO: 0.4 % — HIGH (ref 0.1–0.3)
LYMPHOCYTES # BLD AUTO: 1.94 K/UL — SIGNIFICANT CHANGE UP (ref 1.2–3.4)
LYMPHOCYTES # BLD AUTO: 1.94 K/UL — SIGNIFICANT CHANGE UP (ref 1.2–3.4)
LYMPHOCYTES # BLD AUTO: 36.3 % — SIGNIFICANT CHANGE UP (ref 20.5–51.1)
LYMPHOCYTES # BLD AUTO: 36.3 % — SIGNIFICANT CHANGE UP (ref 20.5–51.1)
MAGNESIUM SERPL-MCNC: 2 MG/DL — SIGNIFICANT CHANGE UP (ref 1.8–2.4)
MAGNESIUM SERPL-MCNC: 2 MG/DL — SIGNIFICANT CHANGE UP (ref 1.8–2.4)
MCHC RBC-ENTMCNC: 31 PG — SIGNIFICANT CHANGE UP (ref 27–31)
MCHC RBC-ENTMCNC: 31 PG — SIGNIFICANT CHANGE UP (ref 27–31)
MCHC RBC-ENTMCNC: 35.2 G/DL — SIGNIFICANT CHANGE UP (ref 32–37)
MCHC RBC-ENTMCNC: 35.2 G/DL — SIGNIFICANT CHANGE UP (ref 32–37)
MCV RBC AUTO: 88.1 FL — SIGNIFICANT CHANGE UP (ref 81–99)
MCV RBC AUTO: 88.1 FL — SIGNIFICANT CHANGE UP (ref 81–99)
MONOCYTES # BLD AUTO: 0.42 K/UL — SIGNIFICANT CHANGE UP (ref 0.1–0.6)
MONOCYTES # BLD AUTO: 0.42 K/UL — SIGNIFICANT CHANGE UP (ref 0.1–0.6)
MONOCYTES NFR BLD AUTO: 7.9 % — SIGNIFICANT CHANGE UP (ref 1.7–9.3)
MONOCYTES NFR BLD AUTO: 7.9 % — SIGNIFICANT CHANGE UP (ref 1.7–9.3)
NEUTROPHILS # BLD AUTO: 2.64 K/UL — SIGNIFICANT CHANGE UP (ref 1.4–6.5)
NEUTROPHILS # BLD AUTO: 2.64 K/UL — SIGNIFICANT CHANGE UP (ref 1.4–6.5)
NEUTROPHILS NFR BLD AUTO: 49.3 % — SIGNIFICANT CHANGE UP (ref 42.2–75.2)
NEUTROPHILS NFR BLD AUTO: 49.3 % — SIGNIFICANT CHANGE UP (ref 42.2–75.2)
NRBC # BLD: 0 /100 WBCS — SIGNIFICANT CHANGE UP (ref 0–0)
NRBC # BLD: 0 /100 WBCS — SIGNIFICANT CHANGE UP (ref 0–0)
NT-PROBNP SERPL-SCNC: 51 PG/ML — SIGNIFICANT CHANGE UP (ref 0–300)
NT-PROBNP SERPL-SCNC: 51 PG/ML — SIGNIFICANT CHANGE UP (ref 0–300)
PLATELET # BLD AUTO: 240 K/UL — SIGNIFICANT CHANGE UP (ref 130–400)
PLATELET # BLD AUTO: 240 K/UL — SIGNIFICANT CHANGE UP (ref 130–400)
PMV BLD: 11.1 FL — HIGH (ref 7.4–10.4)
PMV BLD: 11.1 FL — HIGH (ref 7.4–10.4)
POTASSIUM SERPL-MCNC: 4.2 MMOL/L — SIGNIFICANT CHANGE UP (ref 3.5–5)
POTASSIUM SERPL-MCNC: 4.2 MMOL/L — SIGNIFICANT CHANGE UP (ref 3.5–5)
POTASSIUM SERPL-SCNC: 4.2 MMOL/L — SIGNIFICANT CHANGE UP (ref 3.5–5)
POTASSIUM SERPL-SCNC: 4.2 MMOL/L — SIGNIFICANT CHANGE UP (ref 3.5–5)
PROT SERPL-MCNC: 7.1 G/DL — SIGNIFICANT CHANGE UP (ref 6–8)
PROT SERPL-MCNC: 7.1 G/DL — SIGNIFICANT CHANGE UP (ref 6–8)
RBC # BLD: 4.52 M/UL — SIGNIFICANT CHANGE UP (ref 4.2–5.4)
RBC # BLD: 4.52 M/UL — SIGNIFICANT CHANGE UP (ref 4.2–5.4)
RBC # FLD: 12.2 % — SIGNIFICANT CHANGE UP (ref 11.5–14.5)
RBC # FLD: 12.2 % — SIGNIFICANT CHANGE UP (ref 11.5–14.5)
SODIUM SERPL-SCNC: 142 MMOL/L — SIGNIFICANT CHANGE UP (ref 135–146)
SODIUM SERPL-SCNC: 142 MMOL/L — SIGNIFICANT CHANGE UP (ref 135–146)
TROPONIN T SERPL-MCNC: <0.01 NG/ML — SIGNIFICANT CHANGE UP
WBC # BLD: 5.35 K/UL — SIGNIFICANT CHANGE UP (ref 4.8–10.8)
WBC # BLD: 5.35 K/UL — SIGNIFICANT CHANGE UP (ref 4.8–10.8)
WBC # FLD AUTO: 5.35 K/UL — SIGNIFICANT CHANGE UP (ref 4.8–10.8)
WBC # FLD AUTO: 5.35 K/UL — SIGNIFICANT CHANGE UP (ref 4.8–10.8)

## 2023-11-12 PROCEDURE — 93306 TTE W/DOPPLER COMPLETE: CPT

## 2023-11-12 PROCEDURE — 82553 CREATINE MB FRACTION: CPT

## 2023-11-12 PROCEDURE — 93005 ELECTROCARDIOGRAM TRACING: CPT

## 2023-11-12 PROCEDURE — 82550 ASSAY OF CK (CPK): CPT

## 2023-11-12 PROCEDURE — 93010 ELECTROCARDIOGRAM REPORT: CPT | Mod: 76

## 2023-11-12 PROCEDURE — 71275 CT ANGIOGRAPHY CHEST: CPT | Mod: 26

## 2023-11-12 PROCEDURE — 71046 X-RAY EXAM CHEST 2 VIEWS: CPT | Mod: 26

## 2023-11-12 PROCEDURE — 99223 1ST HOSP IP/OBS HIGH 75: CPT

## 2023-11-12 PROCEDURE — 71275 CT ANGIOGRAPHY CHEST: CPT

## 2023-11-12 PROCEDURE — 80048 BASIC METABOLIC PNL TOTAL CA: CPT

## 2023-11-12 PROCEDURE — 99285 EMERGENCY DEPT VISIT HI MDM: CPT

## 2023-11-12 PROCEDURE — 74174 CTA ABD&PLVS W/CONTRAST: CPT

## 2023-11-12 PROCEDURE — 36415 COLL VENOUS BLD VENIPUNCTURE: CPT

## 2023-11-12 PROCEDURE — 80061 LIPID PANEL: CPT

## 2023-11-12 PROCEDURE — 85025 COMPLETE CBC W/AUTO DIFF WBC: CPT

## 2023-11-12 PROCEDURE — 83036 HEMOGLOBIN GLYCOSYLATED A1C: CPT

## 2023-11-12 PROCEDURE — 74174 CTA ABD&PLVS W/CONTRAST: CPT | Mod: 26

## 2023-11-12 PROCEDURE — 84484 ASSAY OF TROPONIN QUANT: CPT

## 2023-11-12 RX ORDER — REGADENOSON 0.08 MG/ML
0.4 INJECTION, SOLUTION INTRAVENOUS ONCE
Refills: 0 | Status: DISCONTINUED | OUTPATIENT
Start: 2023-11-12 | End: 2023-11-13

## 2023-11-12 RX ORDER — ATORVASTATIN CALCIUM 80 MG/1
80 TABLET, FILM COATED ORAL AT BEDTIME
Refills: 0 | Status: DISCONTINUED | OUTPATIENT
Start: 2023-11-12 | End: 2023-11-13

## 2023-11-12 RX ORDER — CYCLOBENZAPRINE HYDROCHLORIDE 10 MG/1
1 TABLET, FILM COATED ORAL
Refills: 0 | DISCHARGE

## 2023-11-12 RX ORDER — CYCLOBENZAPRINE HYDROCHLORIDE 10 MG/1
10 TABLET, FILM COATED ORAL EVERY 12 HOURS
Refills: 0 | Status: DISCONTINUED | OUTPATIENT
Start: 2023-11-12 | End: 2023-11-13

## 2023-11-12 RX ORDER — NITROGLYCERIN 6.5 MG
0.4 CAPSULE, EXTENDED RELEASE ORAL
Refills: 0 | Status: DISCONTINUED | OUTPATIENT
Start: 2023-11-12 | End: 2023-11-13

## 2023-11-12 RX ORDER — METOPROLOL TARTRATE 50 MG
25 TABLET ORAL
Refills: 0 | Status: DISCONTINUED | OUTPATIENT
Start: 2023-11-12 | End: 2023-11-13

## 2023-11-12 RX ORDER — NALOXEGOL OXALATE 12.5 MG/1
25 TABLET, FILM COATED ORAL DAILY
Refills: 0 | Status: DISCONTINUED | OUTPATIENT
Start: 2023-11-12 | End: 2023-11-12

## 2023-11-12 RX ORDER — NALOXEGOL OXALATE 12.5 MG/1
1 TABLET, FILM COATED ORAL
Refills: 0 | DISCHARGE

## 2023-11-12 RX ORDER — MORPHINE SULFATE 50 MG/1
4 CAPSULE, EXTENDED RELEASE ORAL ONCE
Refills: 0 | Status: DISCONTINUED | OUTPATIENT
Start: 2023-11-12 | End: 2023-11-12

## 2023-11-12 RX ORDER — MORPHINE SULFATE 50 MG/1
2 CAPSULE, EXTENDED RELEASE ORAL EVERY 6 HOURS
Refills: 0 | Status: DISCONTINUED | OUTPATIENT
Start: 2023-11-12 | End: 2023-11-13

## 2023-11-12 RX ORDER — ALBUTEROL 90 UG/1
1 AEROSOL, METERED ORAL EVERY 4 HOURS
Refills: 0 | Status: DISCONTINUED | OUTPATIENT
Start: 2023-11-12 | End: 2023-11-13

## 2023-11-12 RX ORDER — HEPARIN SODIUM 5000 [USP'U]/ML
5000 INJECTION INTRAVENOUS; SUBCUTANEOUS EVERY 12 HOURS
Refills: 0 | Status: DISCONTINUED | OUTPATIENT
Start: 2023-11-12 | End: 2023-11-13

## 2023-11-12 RX ORDER — ASPIRIN/CALCIUM CARB/MAGNESIUM 324 MG
81 TABLET ORAL DAILY
Refills: 0 | Status: DISCONTINUED | OUTPATIENT
Start: 2023-11-12 | End: 2023-11-13

## 2023-11-12 RX ADMIN — MORPHINE SULFATE 2 MILLIGRAM(S): 50 CAPSULE, EXTENDED RELEASE ORAL at 22:07

## 2023-11-12 RX ADMIN — Medication 81 MILLIGRAM(S): at 17:18

## 2023-11-12 RX ADMIN — ATORVASTATIN CALCIUM 80 MILLIGRAM(S): 80 TABLET, FILM COATED ORAL at 20:17

## 2023-11-12 RX ADMIN — Medication 0.4 MILLIGRAM(S): at 16:53

## 2023-11-12 RX ADMIN — MORPHINE SULFATE 4 MILLIGRAM(S): 50 CAPSULE, EXTENDED RELEASE ORAL at 10:52

## 2023-11-12 RX ADMIN — MORPHINE SULFATE 2 MILLIGRAM(S): 50 CAPSULE, EXTENDED RELEASE ORAL at 20:16

## 2023-11-12 NOTE — ED PROVIDER NOTE - OBJECTIVE STATEMENT
patient is a 59yo female PMH CAD s/p 1 stent, anxiety. pt states x4 days she has been having intermittent, sharp, substernal chest pain and heaviness, pain has become more frequent and severe today. patient is a 57yo female PMH CAD s/p 1 stent, anxiety. pt states x4 days she has been having intermittent, sharp, substernal chest pain and heaviness, pain has become more frequent and severe today. episodes of pain last a few seconds and resolve, worse with deep inspiration. pt states this is how she felt prior to needing stent. denies relieving factors. denies shortness of breath, nausea, vomiting, adbominal pain, diaphoresis, leg pain/ swelling. patient is a 57yo female PMH CAD s/p 1 stent, anxiety. pt states x4 days she has been having intermittent, sharp, substernal chest pain and heaviness, pain has become more frequent and severe today. episodes of pain last a few seconds and resolve, worse with deep inspiration. pt states this is how she felt prior to needing stent. denies relieving factors. denies shortness of breath, nausea, vomiting, adbominal pain, diaphoresis, leg pain/ swelling. pt follows w Dr. King, last stress test >1 year ago

## 2023-11-12 NOTE — CONSULT NOTE ADULT - ASSESSMENT
58 year old female with PMH CAD (s/p stent to mid LAD in 2020), asthma, lupus - presented to ED c/o intermittent chest pain x4 days      Impression:  #Chest pain: r/o ACS  #Myocardial bridging  #Hx of CAD s/p PCI x2 stents       Pain appears atypical, an currently still w/ pain 7/10 in intensity  Trop flat x1  EKC: NSR, non ischemic  Stress Echo on 3/2022 was negative for ischemia  Cxr: Unremarkable        Plan:  -Repeat troponin and trend  -Repeat ECG in am   -Get TTE  -NG if chest pain  -C/w ASA, atorvastatin  -Given Hx and risk factors, recommend inpatient Lexiscan NM stress to r/o obstructive CAD   58 year old female with PMH CAD (s/p stent to mid LAD in 2020), asthma, lupus - presented to ED c/o intermittent chest pain x4 days      Impression:  #Chest pain: r/o ACS  #Myocardial bridging  #Hx of CAD s/p PCI x2 stents       Pain appears atypical, an currently still w/ pain 7/10 in intensity  Trop flat x1  EKC: NSR, non ischemic  Stress Echo on 3/2022 was negative for ischemia  Cxr: Unremarkable        Plan:  -Repeat troponin and trend  -Repeat ECG in am   -Get TTE  -NG if chest pain  -C/w ASA, atorvastatin  -Given Hx and risk factors, recommend inpatient Lexiscan NM stress to r/o obstructive CAD  -Would check D-dimer, and if elevated may need CTA protocol for PE   -Monitor lytes

## 2023-11-12 NOTE — H&P ADULT - NSHPPHYSICALEXAM_GEN_ALL_CORE
GEN: NAD  lungs: cta  chest: no TTP to thoracic/rib area  cvs: s1s2  abd: soft, NT/ND  ext: no LE edema appreciated

## 2023-11-12 NOTE — H&P ADULT - NSHPLABSRESULTS_GEN_ALL_CORE
Vital Signs Last 24 Hrs  T(C): 36.7 (12 Nov 2023 10:10), Max: 36.7 (12 Nov 2023 10:10)  T(F): 98 (12 Nov 2023 10:10), Max: 98 (12 Nov 2023 10:10)  HR: 74 (12 Nov 2023 11:42) (74 - 111)  BP: 111/68 (12 Nov 2023 11:42) (111/68 - 136/75)  BP(mean): --  RR: 18 (12 Nov 2023 11:42) (18 - 18)  SpO2: 98% (12 Nov 2023 11:42) (97% - 98%)    Parameters below as of 12 Nov 2023 11:42  Patient On (Oxygen Delivery Method): room air    Labs:  CAPILLARY BLOOD GLUCOSE                              14.0   5.35  )-----------( 240      ( 12 Nov 2023 10:30 )             39.8       Auto Neutrophil %: 49.3 % (11-12-23 @ 10:30)  Auto Immature Granulocyte %: 0.4 % (11-12-23 @ 10:30)    11-12    142  |  105  |  16  ----------------------------<  94  4.2   |  26  |  0.7      Calcium: 10.0 mg/dL (11-12-23 @ 10:30)      LFTs:             7.1  | 0.6  | 19       ------------------[67      ( 12 Nov 2023 10:30 )  4.9  | x    | 17          Lipase:x      Amylase:x             Coags:    CARDIAC MARKERS ( 12 Nov 2023 10:30 )  x     / <0.01 ng/mL / x     / x     / x          Urinalysis Basic - ( 12 Nov 2023 10:30 )    Color: x / Appearance: x / SG: x / pH: x  Gluc: 94 mg/dL / Ketone: x  / Bili: x / Urobili: x   Blood: x / Protein: x / Nitrite: x   Leuk Esterase: x / RBC: x / WBC x   Sq Epi: x / Non Sq Epi: x / Bacteria: x

## 2023-11-12 NOTE — ED PROVIDER NOTE - ATTENDING APP SHARED VISIT CONTRIBUTION OF CARE
I have personally performed a history and physical exam on this patient and personally directed the management of the patient. Patient is a 58-year-old female presents for evaluation of chest pain and she has a past medical history of coronary artery states she been having sharp intermittent pain over the past 4 days worsening with associated shortness of breath states that this is similar to prior episode needing stent placement denies any diaphoresis palpitations fevers chills cough    Physical exam patient is normocephalic atraumatic pupils equal round react light accommodation extraocular muscles intact oropharynx clear chest clear to auscultation bilaterally abdomen soft nontender nondistended bowel sounds positive no guarding or rebound extremities full range of motion no focal deficits noted    given her presentation we obtained EKG per my independent valuation is not consistent with STEMI in addition chest x-ray per my independent evaluation not consistent with pneumonia or pneumothorax we obtain labs including troponin which is negative nevertheless given this patient's prior past medical history and presentation I will admit for further evaluation at this time

## 2023-11-12 NOTE — H&P ADULT - CONVERSATION DETAILS
No significant past surgical history
Current plan of care including prognosis discussed with patient, all options were discussed and opted for full code with full understanding of what is involved in detail.

## 2023-11-12 NOTE — CONSULT NOTE ADULT - SUBJECTIVE AND OBJECTIVE BOX
HPI:  Pt is a 58 year old female with PMH CAD (s/p stent to mid LAD in 2020 by Christine), asthma, lupus -  presents to ED c/o intermittent, sharp, substernal chest pain and heaviness x 4 days, stating pain has become more frequent and severe today.  Pain began on Thursday while sleeping, mid-sternal chest pressure (4/10), occasionally radiate dto back, occurred intermittently over the next few days. Pt took her regular cardiac meds, and percocet (which did not relieve pain). Overnight, pain still occurred, associated with nausea and sweats, but no vomiting.  Denies F/C/V/light headedness/dizziness.  Also, c/o heartburn this week, relieved with TUMS.      Pt also states that 2 weeks ago while shopping with home attendant, she was pushing the cart and felt lightheaded and passed out (she was guided to floor by people around her).  She passed out for approx 2 minutes (per her attendant) - she did not go to ER at that time. No prior episodes      Pt's cardiologist, Dr. King: last seen a few months ago. Had stress test approx 1 yr ago: normal as per pt.    In ED:   -afebrile, labs/BNP/D-dimer: WNL; negative trop x1; EKG  -morphine 4 mg x 1 dose w/relief  - During physical exam in ER, pt had recurrence of right/midsteranl chest pain (sharp) upon deep inspiration. Episode lasted approx 30 seconds then resolved on its own; not reproducible on palpation  - cardiology at bedside: recommending NMST (12 Nov 2023 13:56)        HPI-Cardiology   Pt with the above Hx, evaluated at bedside. Pt presented for eval of CP x4 days. Pt states she was sleeping and woke up with "sharp" chest pain, and points to the right side of chest, where the pain is. Pain has been intermittent, non radiating, and worse with deep inspiration, and when sitting forward. No alleviating factors. Pt states the pain is similar to pain when she had the stents placed in 2020. Denies any other associated symptoms.  Radiology tests and hospital records, were reviewed, as well as previous notes on this patient.      PAST MEDICAL & SURGICAL HISTORY  Carpal tunnel syndrome of left wrist  s/p rt ctr 5/17    Asthma  no flare yrs no meds    CAD (coronary artery disease)  s/p stent to LAD in 2020    H/O arthroscopy of shoulder  rt 2017    S/P lumbar discectomy    S/P drug eluting coronary stent placement  08/2021        ALLERGIES:  No Known Allergies      MEDICATIONS:  MEDICATIONS  (STANDING):  aspirin enteric coated 81 milliGRAM(s) Oral daily  atorvastatin 80 milliGRAM(s) Oral at bedtime    MEDICATIONS  (PRN):  albuterol    90 MICROgram(s) HFA Inhaler 1 Puff(s) Inhalation every 4 hours PRN Shortness of Breath and/or Wheezing  cyclobenzaprine 10 milliGRAM(s) Oral every 12 hours PRN Muscle Spasm  nitroglycerin     SubLingual 0.4 milliGRAM(s) SubLingual every 5 minutes PRN Chest Pain  oxycodone    5 mG/acetaminophen 325 mG 1 Tablet(s) Oral every 4 hours PRN Moderate Pain (4 - 6)      HOME MEDICATIONS:  Home Medications:  aspirin 81 mg oral delayed release tablet: 1 tab(s) orally once a day (12 Nov 2023 10:24)  atorvastatin 80 mg oral tablet: 1 tab(s) orally once a day (at bedtime) (12 Nov 2023 10:24)  cyclobenzaprine 10 mg oral tablet: 1 tab(s) orally 2 times a day (12 Nov 2023 14:43)  Movantik 25 mg oral tablet: 1 tab(s) orally once a day (12 Nov 2023 14:43)  oxycodone-acetaminophen 5 mg-325 mg oral tablet: 1 tab(s) orally every 4 hours, As needed, Moderate Pain (4 - 6) (12 Nov 2023 10:24)      VITALS:   T(F): 98 (11-12 @ 10:10), Max: 98 (11-12 @ 10:10)  HR: 74 (11-12 @ 11:42) (74 - 111)  BP: 111/68 (11-12 @ 11:42) (111/68 - 136/75)  BP(mean): --  RR: 18 (11-12 @ 11:42) (18 - 18)  SpO2: 98% (11-12 @ 11:42) (97% - 98%)    I&O's Summary      REVIEW OF SYSTEMS:  See HPI        PHYSICAL EXAM:  NEURO: patient is awake , alert and oriented  GEN: Not in acute distress  NECK: no thyroid enlargement, no JVD  LUNGS: Clear to auscultation bilaterally   CARDIOVASCULAR: S1/S2 present, RRR , no murmurs or rubs, no carotid bruits,  + PP bilaterally  ABD: Soft, non-tender, non-distended, +BS  EXT: No KAM  SKIN: Intact      LABS:                        14.0   5.35  )-----------( 240      ( 12 Nov 2023 10:30 )             39.8     11-12    142  |  105  |  16  ----------------------------<  94  4.2   |  26  |  0.7    Ca    10.0      12 Nov 2023 10:30  Mg     2.0     11-12    TPro  7.1  /  Alb  4.9  /  TBili  0.6  /  DBili  x   /  AST  19  /  ALT  17  /  AlkPhos  67  11-12      Troponin T, Serum: <0.01 ng/mL (11-12-23 @ 10:30)    CARDIAC MARKERS ( 12 Nov 2023 10:30 )  x     / <0.01 ng/mL / x     / x     / x                RADIOLOGY:  -CXR:  < from: Xray Chest 2 Views PA/Lat (11.12.23 @ 11:33) >    Impression:    No radiographic evidence of acute cardiopulmonary disease.        --- End of Report ---      < end of copied text >

## 2023-11-12 NOTE — ED PROVIDER NOTE - CLINICAL SUMMARY MEDICAL DECISION MAKING FREE TEXT BOX
given her presentation we obtained EKG per my independent valuation is not consistent with STEMI in addition chest x-ray per my independent evaluation not consistent with pneumonia or pneumothorax we obtain labs including troponin which is negative nevertheless given this patient's prior past medical history and presentation I will admit for further evaluation at this time

## 2023-11-12 NOTE — CONSULT NOTE ADULT - NS ATTEND AMEND GEN_ALL_CORE FT
Pt is a 58 year old female with PMH CAD (s/p stent to mid LAD in 2020 by Christine), asthma, lupus -  presents to ED c/o intermittent, sharp, substernal chest pain and heaviness x 4 days.  Pain is pleuritic. Also exertional MI r/o. Patient agrees to adenocard thallium.

## 2023-11-12 NOTE — H&P ADULT - NSHPSOCIALHISTORY_GEN_ALL_CORE
quit tob 7 yrs ago  denies ETOH  +occasional marijuana (1x every 2 weeks)  unemployed   quit cigarrette 1 month ago.   denies ETOH  +occasional marijuana (1x every 2 weeks) takes for lupus.   unemployed

## 2023-11-12 NOTE — H&P ADULT - HISTORY OF PRESENT ILLNESS
Pt is a 58 year old female with PMH CAD (s/p stent to mid LAD in 2020 by Christine), asthma, anxiety -  presents to ED c/o intermittent, sharp, substernal chest pain and heaviness x 4 days, stating pain has become more frequent and severe today.   Pt is a 58 year old female with PMH CAD (s/p stent to mid LAD in 2020 by Christine), asthma, anxiety -  presents to ED c/o intermittent, sharp, substernal chest pain and heaviness x 4 days, stating pain has become more frequent and severe today.      In ED:   -afebrile, labs/BNP/D-dimer: WNL; negative trop x1; EKG  -morphine 4 mg x 1 dose w/ Pt is a 58 year old female with PMH CAD (s/p stent to mid LAD in 2020 by Christine), asthma, lupus -  presents to ED c/o intermittent, sharp, substernal chest pain and heaviness x 4 days, stating pain has become more frequent and severe today.  Pain began on Thursday while sleeping, mid-sternal chest pressure (4/10), occasionally radiate dto back, occurred intermittently over the next few days. Pt took her regular cardiac meds, and percocet (which did not relieve pain). Overnight, pain still occurred, associated with nausea and sweats, but no vomiting.  Denies F/C/V/light headedness/dizziness.  No aggravating/relieving factors.  Also, c/o heartburn this week, relieved with TUMS.      Pt's cardiologist, Dr. King: last seen a few months ago. Had stress test approx 1 yr ago: normal as per pt.    In ED:   -afebrile, labs/BNP/D-dimer: WNL; negative trop x1; EKG  -morphine 4 mg x 1 dose w/relief Pt is a 58 year old female with PMH CAD (s/p stent to mid LAD in 2020 by Christine), asthma, lupus -  presents to ED c/o intermittent, sharp, substernal chest pain and heaviness x 4 days, stating pain has become more frequent and severe today.  Pain began on Thursday while sleeping, mid-sternal chest pressure (4/10), occasionally radiate dto back, occurred intermittently over the next few days. Pt took her regular cardiac meds, and percocet (which did not relieve pain). Overnight, pain still occurred, associated with nausea and sweats, but no vomiting.  Denies F/C/V/light headedness/dizziness.  No aggravating/relieving factors.  Also, c/o heartburn this week, relieved with TUMS.      Pt also states that 2 weeks ago while shopping with home attendant, she was pushing the cart and felt lightheaded and passed out (she was guided to floor by people around her).  She passed out for approx 2 minutes (per her attendant) - she did not go to ER at that time. No prior episodes      Pt's cardiologist, Dr. King: last seen a few months ago. Had stress test approx 1 yr ago: normal as per pt.    In ED:   -afebrile, labs/BNP/D-dimer: WNL; negative trop x1; EKG  -morphine 4 mg x 1 dose w/relief  - During physical exam in ER, pt had recurrence of right chest pain (sharp) upon deep inspiration. Episode lasted approx 30 seconds then resolved on itis own; not reproducible on palpation Pt is a 58 year old female with PMH CAD (s/p stent to mid LAD in 2020 by Christine), asthma, lupus -  presents to ED c/o intermittent, sharp, substernal chest pain and heaviness x 4 days, stating pain has become more frequent and severe today.  Pain began on Thursday while sleeping, mid-sternal chest pressure (4/10), occasionally radiate dto back, occurred intermittently over the next few days. Pt took her regular cardiac meds, and percocet (which did not relieve pain). Overnight, pain still occurred, associated with nausea and sweats, but no vomiting.  Denies F/C/V/light headedness/dizziness.  Also, c/o heartburn this week, relieved with TUMS.      Pt also states that 2 weeks ago while shopping with home attendant, she was pushing the cart and felt lightheaded and passed out (she was guided to floor by people around her).  She passed out for approx 2 minutes (per her attendant) - she did not go to ER at that time. No prior episodes      Pt's cardiologist, Dr. King: last seen a few months ago. Had stress test approx 1 yr ago: normal as per pt.    In ED:   -afebrile, labs/BNP/D-dimer: WNL; negative trop x1; EKG  -morphine 4 mg x 1 dose w/relief  - During physical exam in ER, pt had recurrence of right/midsteranl chest pain (sharp) upon deep inspiration. Episode lasted approx 30 seconds then resolved on its own; not reproducible on palpation  - cardiology at bedside: recommending NMST Pt is a 58 year old female with PMH CAD (s/p stent to mid LAD in 2020 by Christine), asthma, lupus -  presents to ED c/o intermittent, sharp, substernal chest pain and heaviness x 4 days, stating pain has become more frequent and severe today now 10/10 worse pain of my life.   Pain began on Thursday while sleeping, mid-sternal chest pressure (4/10), occasionally radiate dto back, occurred intermittently over the next few days. Pt took her regular cardiac meds, and percocet (which did not relieve pain). Overnight, pain still occurred, associated with nausea and sweats, but no vomiting.  Denies F/C/V/light headedness/dizziness.  Also, c/o heartburn this week, relieved with TUMS.   About 1 month ago binged on cigarettes, no current wheeze or cough.     Pt also states that 2 weeks ago while shopping with home attendant, she was pushing the cart and felt lightheaded and passed out (she was guided to floor by people around her).  She passed out for approx 2 minutes (per her attendant) - she did not go to ER at that time. No prior episodes      Pt's cardiologist, Dr. King: last seen a few months ago. Had stress test approx 1 yr ago: normal as per pt.    In ED:   -afebrile, labs/BNP/D-dimer: WNL; negative trop x1; EKG  -morphine 4 mg x 1 dose w/relief  - During physical exam in ER, pt had recurrence of right/midsteranl chest pain (sharp) upon deep inspiration. Episode lasted approx 30 seconds then resolved on its own; not reproducible on palpation  - cardiology at bedside: recommending NMST

## 2023-11-12 NOTE — H&P ADULT - ASSESSMENT
Pt is a 58 year old female with PMH CAD (s/p stent to mid LAD in 2020 by Christine), asthma, anxiety -  presents to ED c/o intermittent, sharp, substernal chest pain and heaviness x 4 days, stating pain has become more frequent and severe today.   Pt is a 58 year old female with PMH CAD (s/p stent to mid LAD in 2020 by Christine), asthma, lupus -  presents to ED c/o intermittent, sharp, substernal chest pain and heaviness x 4 days, stating pain has become more frequent and severe today.  Pain began on Thursday while sleeping, mid-sternal chest pressure (4/10), occasionally radiate dto back, occurred intermittently over the next few days. Pt took her regular cardiac meds, and percocet (which did not relieve pain). Overnight, pain still occurred, associated with nausea and sweats, but no vomiting.  Denies F/C/V/light headedness/dizziness.  Also, c/o heartburn this week, relieved with TUMS.      Pt also states that 2 weeks ago while shopping with home attendant, she was pushing the cart and felt lightheaded and passed out (she was guided to floor by people around her).  She passed out for approx 2 minutes (per her attendant) - she did not go to ER at that time. No prior episodes      1. R/o ACS  2. CAD, s/p stent to mid LAD 2020 by Dr. King    -cont ASA 81, atovastatin 80 mg  -f/u CE at 3 pm & 10 pm today  -cont percocet PRN for pain (she also takes this for lupus/chronic pain)  - F/U cardio: needs NMST; EKG: no acute changes  - f/u am labs    3. Asthma   - cont albuterol MDI q 4 hrs PRN   - f/u CXR (prelim : neg)    4. Lupus   - continue percocet  - continue cyclobenzaprine  -on movantik for opioid induced constipation    5. GI/ DVT prophylaxis    - pepcid     Pt is a 58 year old female with PMH CAD (s/p stent to mid LAD in 2020 by Christine), asthma, lupus -  presents to ED c/o intermittent, sharp, substernal chest pain and heaviness x 4 days, stating pain has become more frequent and severe today.  Pain began on Thursday while sleeping, mid-sternal chest pressure (4/10), occasionally radiate dto back, occurred intermittently over the next few days. Pt took her regular cardiac meds, and percocet (which did not relieve pain). Overnight, pain still occurred, associated with nausea and sweats, but no vomiting.  Denies F/C/V/light headedness/dizziness.  Also, c/o heartburn this week, relieved with TUMS.      Pt also states that 2 weeks ago while shopping with home attendant, she was pushing the cart and felt lightheaded and passed out (she was guided to floor by people around her).  She passed out for approx 2 minutes (per her attendant) - she did not go to ER at that time. No prior episodes      1. R/o ACS  2. CAD, s/p stent to mid LAD 2020 by Dr. King    -cont ASA 81, atovastatin 80 mg  -f/u CE at 3 pm & 10 pm today  -cont percocet PRN for pain (she also takes this for lupus/chronic pain)  - F/U cardio: needs NMST; EKG: no acute changes  - f/u am labs    3. Asthma   - cont albuterol MDI q 4 hrs PRN   - f/u CXR (prelim : neg)    4. Lupus   - continue percocet  - continue cyclobenzaprine  -on movantik for opioid induced constipation (nonformulary)    5. GI/ DVT prophylaxis    - pepcid     Pt is a 58 year old female with PMH CAD (s/p stent to mid LAD in 2020 by Christine), asthma, lupus -  presents to ED c/o intermittent, sharp, substernal chest pain and heaviness x 4 days, stating pain has become more frequent and severe today.  Pain began on Thursday while sleeping, mid-sternal chest pressure (4/10), occasionally radiate dto back, occurred intermittently over the next few days. Pt took her regular cardiac meds, and percocet (which did not relieve pain). Overnight, pain still occurred, associated with nausea and sweats, but no vomiting.  Denies F/C/V/light headedness/dizziness.  Also, c/o heartburn this week, relieved with TUMS.      Pt also states that 2 weeks ago while shopping with home attendant, she was pushing the cart and felt lightheaded and passed out (she was guided to floor by people around her).  She passed out for approx 2 minutes (per her attendant) - she did not go to ER at that time. No prior episodes      Chest pain rule out ACS, rule out aortic dissection.   -. CAD, s/p stent to mid LAD 2020 by Dr. King  - Stat CT angio chest AP d/w nursing and radiology    -cont ASA 81, atovastatin 80 mg, add metoprolol 25 mg bid, supplemental o2.   - f/u CE at 3 pm & 10 pm today, trop x 2 negative  - check a1c and flp in am.   -cont percocet PRN for pain (she also takes this for lupus/chronic pain)  - cariology consult : needs NMST; EKG: no acute changes  - f/u am labs, obtain ECHO.   - NPO after MN except meds.   - signed out CT angio to night team.     Mild Persistent Asthma   - cont albuterol MDI q 4 hrs PRN   - f/u CXR (prelim : neg)    Lupus   - continue percocet  - continue cyclobenzaprine  -on movantik for opioid induced constipation (nonformulary)  - smokes marijuana for relief.     Hx/o sent   - cont asa, statin, start metoprolol     DVT/GI prophylaxis    -  heparin SQ/ pepcid    Full code

## 2023-11-12 NOTE — ED PROVIDER NOTE - PHYSICAL EXAMINATION
CONST: Well appearing in NAD  CARD: Normal S1 S2; Normal rhythm, mild tachycardia  RESP: Equal BS B/L, No wheezes, rhonchi or rales. No distress  MS: Normal ROM in all extremities. No midline spinal tenderness.  SKIN: Warm, dry, no acute rashes. Good turgor  NEURO: A&Ox3, No focal deficits. Strength 5/5 with no sensory deficits

## 2023-11-13 ENCOUNTER — TRANSCRIPTION ENCOUNTER (OUTPATIENT)
Age: 58
End: 2023-11-13

## 2023-11-13 VITALS
SYSTOLIC BLOOD PRESSURE: 105 MMHG | DIASTOLIC BLOOD PRESSURE: 59 MMHG | OXYGEN SATURATION: 97 % | TEMPERATURE: 98 F | HEART RATE: 83 BPM

## 2023-11-13 LAB
A1C WITH ESTIMATED AVERAGE GLUCOSE RESULT: 5.5 % — SIGNIFICANT CHANGE UP (ref 4–5.6)
A1C WITH ESTIMATED AVERAGE GLUCOSE RESULT: 5.5 % — SIGNIFICANT CHANGE UP (ref 4–5.6)
ANION GAP SERPL CALC-SCNC: 8 MMOL/L — SIGNIFICANT CHANGE UP (ref 7–14)
ANION GAP SERPL CALC-SCNC: 8 MMOL/L — SIGNIFICANT CHANGE UP (ref 7–14)
BASOPHILS # BLD AUTO: 0.05 K/UL — SIGNIFICANT CHANGE UP (ref 0–0.2)
BASOPHILS # BLD AUTO: 0.05 K/UL — SIGNIFICANT CHANGE UP (ref 0–0.2)
BASOPHILS NFR BLD AUTO: 1 % — SIGNIFICANT CHANGE UP (ref 0–1)
BASOPHILS NFR BLD AUTO: 1 % — SIGNIFICANT CHANGE UP (ref 0–1)
BUN SERPL-MCNC: 15 MG/DL — SIGNIFICANT CHANGE UP (ref 10–20)
BUN SERPL-MCNC: 15 MG/DL — SIGNIFICANT CHANGE UP (ref 10–20)
CALCIUM SERPL-MCNC: 9.3 MG/DL — SIGNIFICANT CHANGE UP (ref 8.4–10.5)
CALCIUM SERPL-MCNC: 9.3 MG/DL — SIGNIFICANT CHANGE UP (ref 8.4–10.5)
CHLORIDE SERPL-SCNC: 107 MMOL/L — SIGNIFICANT CHANGE UP (ref 98–110)
CHLORIDE SERPL-SCNC: 107 MMOL/L — SIGNIFICANT CHANGE UP (ref 98–110)
CHOLEST SERPL-MCNC: 147 MG/DL — SIGNIFICANT CHANGE UP
CHOLEST SERPL-MCNC: 147 MG/DL — SIGNIFICANT CHANGE UP
CO2 SERPL-SCNC: 25 MMOL/L — SIGNIFICANT CHANGE UP (ref 17–32)
CO2 SERPL-SCNC: 25 MMOL/L — SIGNIFICANT CHANGE UP (ref 17–32)
CREAT SERPL-MCNC: 0.7 MG/DL — SIGNIFICANT CHANGE UP (ref 0.7–1.5)
CREAT SERPL-MCNC: 0.7 MG/DL — SIGNIFICANT CHANGE UP (ref 0.7–1.5)
EGFR: 100 ML/MIN/1.73M2 — SIGNIFICANT CHANGE UP
EGFR: 100 ML/MIN/1.73M2 — SIGNIFICANT CHANGE UP
EOSINOPHIL # BLD AUTO: 0.29 K/UL — SIGNIFICANT CHANGE UP (ref 0–0.7)
EOSINOPHIL # BLD AUTO: 0.29 K/UL — SIGNIFICANT CHANGE UP (ref 0–0.7)
EOSINOPHIL NFR BLD AUTO: 6.1 % — SIGNIFICANT CHANGE UP (ref 0–8)
EOSINOPHIL NFR BLD AUTO: 6.1 % — SIGNIFICANT CHANGE UP (ref 0–8)
ESTIMATED AVERAGE GLUCOSE: 111 MG/DL — SIGNIFICANT CHANGE UP (ref 68–114)
ESTIMATED AVERAGE GLUCOSE: 111 MG/DL — SIGNIFICANT CHANGE UP (ref 68–114)
GLUCOSE SERPL-MCNC: 85 MG/DL — SIGNIFICANT CHANGE UP (ref 70–99)
GLUCOSE SERPL-MCNC: 85 MG/DL — SIGNIFICANT CHANGE UP (ref 70–99)
HCT VFR BLD CALC: 38.4 % — SIGNIFICANT CHANGE UP (ref 37–47)
HCT VFR BLD CALC: 38.4 % — SIGNIFICANT CHANGE UP (ref 37–47)
HDLC SERPL-MCNC: 59 MG/DL — SIGNIFICANT CHANGE UP
HDLC SERPL-MCNC: 59 MG/DL — SIGNIFICANT CHANGE UP
HGB BLD-MCNC: 13 G/DL — SIGNIFICANT CHANGE UP (ref 12–16)
HGB BLD-MCNC: 13 G/DL — SIGNIFICANT CHANGE UP (ref 12–16)
IMM GRANULOCYTES NFR BLD AUTO: 0.2 % — SIGNIFICANT CHANGE UP (ref 0.1–0.3)
IMM GRANULOCYTES NFR BLD AUTO: 0.2 % — SIGNIFICANT CHANGE UP (ref 0.1–0.3)
LIPID PNL WITH DIRECT LDL SERPL: 69 MG/DL — SIGNIFICANT CHANGE UP
LIPID PNL WITH DIRECT LDL SERPL: 69 MG/DL — SIGNIFICANT CHANGE UP
LYMPHOCYTES # BLD AUTO: 2.19 K/UL — SIGNIFICANT CHANGE UP (ref 1.2–3.4)
LYMPHOCYTES # BLD AUTO: 2.19 K/UL — SIGNIFICANT CHANGE UP (ref 1.2–3.4)
LYMPHOCYTES # BLD AUTO: 45.7 % — SIGNIFICANT CHANGE UP (ref 20.5–51.1)
LYMPHOCYTES # BLD AUTO: 45.7 % — SIGNIFICANT CHANGE UP (ref 20.5–51.1)
MCHC RBC-ENTMCNC: 30 PG — SIGNIFICANT CHANGE UP (ref 27–31)
MCHC RBC-ENTMCNC: 30 PG — SIGNIFICANT CHANGE UP (ref 27–31)
MCHC RBC-ENTMCNC: 33.9 G/DL — SIGNIFICANT CHANGE UP (ref 32–37)
MCHC RBC-ENTMCNC: 33.9 G/DL — SIGNIFICANT CHANGE UP (ref 32–37)
MCV RBC AUTO: 88.5 FL — SIGNIFICANT CHANGE UP (ref 81–99)
MCV RBC AUTO: 88.5 FL — SIGNIFICANT CHANGE UP (ref 81–99)
MONOCYTES # BLD AUTO: 0.35 K/UL — SIGNIFICANT CHANGE UP (ref 0.1–0.6)
MONOCYTES # BLD AUTO: 0.35 K/UL — SIGNIFICANT CHANGE UP (ref 0.1–0.6)
MONOCYTES NFR BLD AUTO: 7.3 % — SIGNIFICANT CHANGE UP (ref 1.7–9.3)
MONOCYTES NFR BLD AUTO: 7.3 % — SIGNIFICANT CHANGE UP (ref 1.7–9.3)
NEUTROPHILS # BLD AUTO: 1.9 K/UL — SIGNIFICANT CHANGE UP (ref 1.4–6.5)
NEUTROPHILS # BLD AUTO: 1.9 K/UL — SIGNIFICANT CHANGE UP (ref 1.4–6.5)
NEUTROPHILS NFR BLD AUTO: 39.7 % — LOW (ref 42.2–75.2)
NEUTROPHILS NFR BLD AUTO: 39.7 % — LOW (ref 42.2–75.2)
NON HDL CHOLESTEROL: 88 MG/DL — SIGNIFICANT CHANGE UP
NON HDL CHOLESTEROL: 88 MG/DL — SIGNIFICANT CHANGE UP
NRBC # BLD: 0 /100 WBCS — SIGNIFICANT CHANGE UP (ref 0–0)
NRBC # BLD: 0 /100 WBCS — SIGNIFICANT CHANGE UP (ref 0–0)
PLATELET # BLD AUTO: 220 K/UL — SIGNIFICANT CHANGE UP (ref 130–400)
PLATELET # BLD AUTO: 220 K/UL — SIGNIFICANT CHANGE UP (ref 130–400)
PMV BLD: 11 FL — HIGH (ref 7.4–10.4)
PMV BLD: 11 FL — HIGH (ref 7.4–10.4)
POTASSIUM SERPL-MCNC: 4.4 MMOL/L — SIGNIFICANT CHANGE UP (ref 3.5–5)
POTASSIUM SERPL-MCNC: 4.4 MMOL/L — SIGNIFICANT CHANGE UP (ref 3.5–5)
POTASSIUM SERPL-SCNC: 4.4 MMOL/L — SIGNIFICANT CHANGE UP (ref 3.5–5)
POTASSIUM SERPL-SCNC: 4.4 MMOL/L — SIGNIFICANT CHANGE UP (ref 3.5–5)
RBC # BLD: 4.34 M/UL — SIGNIFICANT CHANGE UP (ref 4.2–5.4)
RBC # BLD: 4.34 M/UL — SIGNIFICANT CHANGE UP (ref 4.2–5.4)
RBC # FLD: 12.2 % — SIGNIFICANT CHANGE UP (ref 11.5–14.5)
RBC # FLD: 12.2 % — SIGNIFICANT CHANGE UP (ref 11.5–14.5)
SODIUM SERPL-SCNC: 140 MMOL/L — SIGNIFICANT CHANGE UP (ref 135–146)
SODIUM SERPL-SCNC: 140 MMOL/L — SIGNIFICANT CHANGE UP (ref 135–146)
TRIGL SERPL-MCNC: 93 MG/DL — SIGNIFICANT CHANGE UP
TRIGL SERPL-MCNC: 93 MG/DL — SIGNIFICANT CHANGE UP
WBC # BLD: 4.79 K/UL — LOW (ref 4.8–10.8)
WBC # BLD: 4.79 K/UL — LOW (ref 4.8–10.8)
WBC # FLD AUTO: 4.79 K/UL — LOW (ref 4.8–10.8)
WBC # FLD AUTO: 4.79 K/UL — LOW (ref 4.8–10.8)

## 2023-11-13 PROCEDURE — 99239 HOSP IP/OBS DSCHRG MGMT >30: CPT

## 2023-11-13 PROCEDURE — 93306 TTE W/DOPPLER COMPLETE: CPT | Mod: 26

## 2023-11-13 PROCEDURE — 99222 1ST HOSP IP/OBS MODERATE 55: CPT

## 2023-11-13 RX ADMIN — Medication 25 MILLIGRAM(S): at 05:18

## 2023-11-13 RX ADMIN — HEPARIN SODIUM 5000 UNIT(S): 5000 INJECTION INTRAVENOUS; SUBCUTANEOUS at 05:18

## 2023-11-13 NOTE — DISCHARGE NOTE PROVIDER - HOSPITAL COURSE
58 year old female with PMH CAD (s/p stent to mid LAD in 2020), asthma, lupus - presented to ED c/o intermittent chest pain x4 days    Impression:  #Chest pain: r/o ACS  #Myocardial bridging  #Hx of CAD s/p PCI x2 stents     Pain appears atypical, an currently still w/ pain 7/10 in intensity  Trop flat x1  EKC: NSR, non ischemic  Stress Echo on 3/2022 was negative for ischemia  Cxr: Unremarkable        Plan:  -Repeat troponin and trend  -Repeat ECG in am   -Get TTE  -NG if chest pain  -C/w ASA, atorvastatin  -Given Hx and risk factors, recommend inpatient Lexiscan NM stress to r/o obstructive CAD  -Would check D-dimer, and if elevated may need CTA protocol for PE   -Monitor lytes    pt left ama       I have discussed the risks, benefits and alternatives (including the possibility of worsening of disease, pain, permanent disability, and/or death) with the patient and his/her family (if available).  The patient voices understanding of these risks, benefits, and alternatives and still wishes to sign out against medical advice.  The patient is awake, alert, oriented x 3 and has demonstrated capacity to refuse/direct care.  I have advised the patient that they can and should return immediately should they develop any worse/different/additional symptoms, or if they change their mind and want to continue their care. Patient has full capacity and has verbalized understanding.   58 year old female with PMH CAD (s/p stent to mid LAD in 2020), asthma, lupus - presented to ED c/o intermittent chest pain x4 days    Impression:  #Chest pain: r/o ACS  #Myocardial bridging  #Hx of CAD s/p PCI x2 stents     Pain appears atypical, an currently still w/ pain 7/10 in intensity  Trop flat x1  EKC: NSR, non ischemic  Stress Echo on 3/2022 was negative for ischemia  Cxr: Unremarkable        Plan:  -Repeat troponin and trend  -Repeat ECG in am   -Get TTE  -NG if chest pain  -C/w ASA, atorvastatin  -Given Hx and risk factors, recommend inpatient Lexiscan NM stress to r/o obstructive CAD  -Would check D-dimer, and if elevated may need CTA protocol for PE   -Monitor lytes    pt left ama       I have discussed the risks, benefits and alternatives (including the possibility of worsening of disease, pain, permanent disability, and/or death) with the patient and his/her family (if available).  The patient voices understanding of these risks, benefits, and alternatives and still wishes to sign out against medical advice.  The patient is awake, alert, oriented x 3 and has demonstrated capacity to refuse/direct care.  I have advised the patient that they can and should return immediately should they develop any worse/different/additional symptoms, or if they change their mind and want to continue their care. Patient has full capacity and has verbalized understanding.      Attending attestation: Pt left AMA before I could physically see her and she was adequately warned about the risks of leaving without a complete cardiac and medical workup. She will see cardiology as OP to schedule a stress test.

## 2023-11-13 NOTE — PROGRESS NOTE ADULT - SUBJECTIVE AND OBJECTIVE BOX
Subjective/Objective:     58 year old female admitted for chest pain    MEDICATIONS  (STANDING):  aspirin enteric coated 81 milliGRAM(s) Oral daily  atorvastatin 80 milliGRAM(s) Oral at bedtime  heparin   Injectable 5000 Unit(s) SubCutaneous every 12 hours  metoprolol tartrate 25 milliGRAM(s) Oral two times a day  regadenoson Injectable 0.4 milliGRAM(s) IV Push once    MEDICATIONS  (PRN):  albuterol    90 MICROgram(s) HFA Inhaler 1 Puff(s) Inhalation every 4 hours PRN Shortness of Breath and/or Wheezing  cyclobenzaprine 10 milliGRAM(s) Oral every 12 hours PRN Muscle Spasm  morphine  - Injectable 2 milliGRAM(s) IV Push every 6 hours PRN Severe Pain (7 - 10)  nitroglycerin     SubLingual 0.4 milliGRAM(s) SubLingual every 5 minutes PRN Chest Pain  oxycodone    5 mG/acetaminophen 325 mG 1 Tablet(s) Oral every 4 hours PRN Moderate Pain (4 - 6)          Vital Signs Last 24 Hrs  T(C): 36.8 (13 Nov 2023 04:34), Max: 37.1 (12 Nov 2023 16:30)  T(F): 98.2 (13 Nov 2023 04:34), Max: 98.8 (12 Nov 2023 16:30)  HR: 83 (13 Nov 2023 04:34) (74 - 111)  BP: 105/59 (13 Nov 2023 04:34) (100/61 - 136/75)  BP(mean): --  RR: 16 (12 Nov 2023 20:17) (16 - 18)  SpO2: 97% (13 Nov 2023 04:34) (97% - 98%)    Parameters below as of 12 Nov 2023 20:17  Patient On (Oxygen Delivery Method): room air      I&O's Detail          GENERAL:  57y/o Female NAD, resting comfortably.  HEAD:  Atraumatic, Normocephalic  EYES: EOMI, PERRLA, conjunctiva and sclera clear  NECK: Supple, No JVD, no cervical lymphadenopathy, non-tender  CHEST/LUNG: Clear to auscultation bilaterally; No wheeze, rhonchi, or rales  HEART: Regular rate and rhythm; S1&S2  ABDOMEN: Soft, Nontender, Nondistended x 4 quadrants; Bowel sounds present  EXTREMITIES:   Peripheral Pulses Present, No clubbing, no cyanosis, or no edema, no calf tenderness  PSYCH: AAOx3, cooperative, appropriate  NEUROLOGY: WNL  SKIN: WNL      EKG/ TELEM:    LABS:                          13.0   4.79  )-----------( 220      ( 13 Nov 2023 04:30 )             38.4       13 Nov 2023 04:30    140    |  107    |  15     ----------------------------<  85     4.4     |  25     |  0.7    12 Nov 2023 10:30    142    |  105    |  16     ----------------------------<  94     4.2     |  26     |  0.7      Ca    9.3        13 Nov 2023 04:30  Ca    10.0       12 Nov 2023 10:30  Mg     2.0       12 Nov 2023 10:30    TPro  7.1    /  Alb  4.9    /  TBili  0.6    /  DBili  x      /  AST  19     /  ALT  17     /  AlkPhos  67     12 Nov 2023 10:30    CARDIAC MARKERS ( 12 Nov 2023 16:03 )  x     / <0.01 ng/mL / 154 U/L / x     / 2.1 ng/mL  CARDIAC MARKERS ( 12 Nov 2023 10:30 )  x     / <0.01 ng/mL / x     / x     / x            Creatine Kinase, Serum: 154 U/L (11-12-23 @ 16:03)              Diagnostic testing:  < from: TTE Echo Complete w/o Contrast w/ Doppler (11.13.23 @ 09:27) >  Summary:   1. Normal global left ventricular systolic function.   2. LV Ejection Fraction by Johnson's Method with a biplane EF of 55 %.   3. Normal right ventricular size and function.   4. Normal left atrial size.   5. Normal right atrial size.   6. Thickened aortic root.   7. Mild thickening of the anterior and posterior mitral valve leaflets.   8. Trace mitral valve regurgitation.   9. Mild-moderate tricuspid regurgitation.  10. Normal pulmonary artery pressure.  11. There is no evidence of pericardial effusion.    PHYSICIAN INTERPRETATION:  Left Ventricle: The left ventricular internal cavity size is normal.   There is no left ventricular hypertrophy. Global LV systolic function was   normal. Normal segmental left ventricular systolic function. Spectral   Doppler shows normal pattern of LV diastolic filling. Normal LV filling   pressures.  Right Ventricle: Normal right ventricular size and function. The right   ventricular size is normal. RV wall thickness is normal. RV systolic   function is normal. TV S' 0.1 m/s.  Left Atrium: Normal left atrial size. Intact intra-atrial septum without   shunt. LA volume Index is 26.7 ml/m² ml/m2.  Right Atrium: Normal right atrial size.  Pericardium: There is no evidence of pericardial effusion.  Mitral Valve: Mild thickening of the anterior and posterior mitral valve   leaflets. No evidence of mitral stenosis. Trace mitral valve   regurgitation is seen.  Tricuspid Valve: Structurally normal tricuspid valve, with normal leaflet   excursion. Mild-moderate tricuspid regurgitation is visualized.  Aortic Valve: The aortic valve was not well visualized. No evidence of   aortic stenosis. No evidence of aortic valve regurgitation is seen.  Pulmonic Valve: The pulmonic valve was not well visualized. No indication   of pulmonic valve regurgitation.  Aorta: The aortic root is normalin size and structure. Thickened aortic   root.  Pulmonary Artery: Normal pulmonary artery pressure.  Venous: The inferior vena cava is normal. The inferior vena cava was   dilated, with respiratory size variation greater than 50%.    < end of copied text >        Assessment and Plan:        
I have discussed the risks, benefits and alternatives (including the possibility of worsening of disease, pain, permanent disability, and/or death) with the patient and his/her family (if available).  The patient voices understanding of these risks, benefits, and alternatives and still wishes to sign out against medical advice.  The patient is awake, alert, oriented x 3 and has demonstrated capacity to refuse/direct care.  I have advised the patient that they can and should return immediately should they develop any worse/different/additional symptoms, or if they change their mind and want to continue their care. Patient has full capacity and has verbalized understanding.    pt also seen by cards and initially was going to await NM stress but again now wants to leave ama

## 2023-11-13 NOTE — DISCHARGE NOTE PROVIDER - NSDCCPCAREPLAN_GEN_ALL_CORE_FT
PRINCIPAL DISCHARGE DIAGNOSIS  Diagnosis: Chest pain  Assessment and Plan of Treatment: you left agaianst medical advise and complete work up was NOT done  please return to ER if pain occurrs or go to PMD

## 2023-11-13 NOTE — DISCHARGE NOTE PROVIDER - CARE PROVIDER_API CALL
Niles Gayle  Internal Medicine  [address] [place] [id number]  Phone: [phone]  Fax: [phone]  Follow Up Time:

## 2023-11-13 NOTE — DISCHARGE NOTE NURSING/CASE MANAGEMENT/SOCIAL WORK - PATIENT PORTAL LINK FT
You can access the FollowMyHealth Patient Portal offered by [facility] by registering at the following website: [email or web address]. By joining [facility]’s FollowMyHealth portal, you will also be able to view your health information using other applications (apps) compatible with our system.

## 2023-11-13 NOTE — DISCHARGE NOTE NURSING/CASE MANAGEMENT/SOCIAL WORK - NSDCPEFALRISK_GEN_ALL_CORE
For information on Fall & Injury Prevention, visit: [email or web address] OR  [email or web address] OR  https://www.cdc.gov/steadi/patient.html

## 2023-11-13 NOTE — DISCHARGE NOTE PROVIDER - NSDCMRMEDTOKEN_GEN_ALL_CORE_FT
albuterol 90 mcg/inh inhalation aerosol: 1 puff(s) inhaled every 4 hours, As needed, Shortness of Breath and/or Wheezing  aspirin 81 mg oral delayed release tablet: 1 tab(s) orally once a day  atorvastatin 80 mg oral tablet: 1 tab(s) orally once a day (at bedtime)  cyclobenzaprine 10 mg oral tablet: 1 tab(s) orally 2 times a day  Movantik 25 mg oral tablet: 1 tab(s) orally once a day  oxycodone-acetaminophen 5 mg-325 mg oral tablet: 1 tab(s) orally every 4 hours, As needed, Moderate Pain (4 - 6)

## 2023-11-13 NOTE — PROGRESS NOTE ADULT - ASSESSMENT
58 year old female with PMH CAD (s/p stent to mid LAD in 2020), asthma, lupus - presented to ED c/o intermittent chest pain x4 days    Trop flat x2  EKC: NSR, non ischemic  TTE unremarkable  Cxr: Unremarkable    Impression:  #Chest pain: r/o ACS  #Myocardial bridging  #Hx of CAD s/p PCI x2 stents     Plan:  - Pt with pleuritic exertional chest pain  - NPO for NMST this am; if stress test negative then ok for dc from cardio standpoint  - Asa/BB/Statin  - Monitor lytes

## 2023-11-24 DIAGNOSIS — J45.30 MILD PERSISTENT ASTHMA, UNCOMPLICATED: ICD-10-CM

## 2023-11-24 DIAGNOSIS — M32.9 SYSTEMIC LUPUS ERYTHEMATOSUS, UNSPECIFIED: ICD-10-CM

## 2023-11-24 DIAGNOSIS — Z87.891 PERSONAL HISTORY OF NICOTINE DEPENDENCE: ICD-10-CM

## 2023-11-24 DIAGNOSIS — F41.9 ANXIETY DISORDER, UNSPECIFIED: ICD-10-CM

## 2023-11-24 DIAGNOSIS — I25.10 ATHEROSCLEROTIC HEART DISEASE OF NATIVE CORONARY ARTERY WITHOUT ANGINA PECTORIS: ICD-10-CM

## 2023-11-24 DIAGNOSIS — Z98.1 ARTHRODESIS STATUS: ICD-10-CM

## 2023-11-24 DIAGNOSIS — Z79.51 LONG TERM (CURRENT) USE OF INHALED STEROIDS: ICD-10-CM

## 2023-11-24 DIAGNOSIS — Z53.29 PROCEDURE AND TREATMENT NOT CARRIED OUT BECAUSE OF PATIENT'S DECISION FOR OTHER REASONS: ICD-10-CM

## 2023-11-24 DIAGNOSIS — R07.9 CHEST PAIN, UNSPECIFIED: ICD-10-CM

## 2023-11-24 DIAGNOSIS — Z79.82 LONG TERM (CURRENT) USE OF ASPIRIN: ICD-10-CM

## 2023-11-24 DIAGNOSIS — Z95.5 PRESENCE OF CORONARY ANGIOPLASTY IMPLANT AND GRAFT: ICD-10-CM

## 2023-12-19 ENCOUNTER — APPOINTMENT (OUTPATIENT)
Dept: CARDIOLOGY | Facility: CLINIC | Age: 58
End: 2023-12-19
Payer: MEDICARE

## 2023-12-19 PROCEDURE — 93351 STRESS TTE COMPLETE: CPT

## 2023-12-19 PROCEDURE — 93320 DOPPLER ECHO COMPLETE: CPT

## 2023-12-19 PROCEDURE — 93325 DOPPLER ECHO COLOR FLOW MAPG: CPT

## 2023-12-27 ENCOUNTER — APPOINTMENT (OUTPATIENT)
Dept: PULMONOLOGY | Facility: CLINIC | Age: 58
End: 2023-12-27
Payer: MEDICARE

## 2023-12-27 VITALS
WEIGHT: 131 LBS | SYSTOLIC BLOOD PRESSURE: 120 MMHG | BODY MASS INDEX: 24.73 KG/M2 | DIASTOLIC BLOOD PRESSURE: 80 MMHG | HEART RATE: 75 BPM | OXYGEN SATURATION: 98 % | HEIGHT: 61 IN

## 2023-12-27 DIAGNOSIS — Z82.61 FAMILY HISTORY OF ARTHRITIS: ICD-10-CM

## 2023-12-27 DIAGNOSIS — R05.3 CHRONIC COUGH: ICD-10-CM

## 2023-12-27 DIAGNOSIS — Z87.39 PERSONAL HISTORY OF OTHER DISEASES OF THE MUSCULOSKELETAL SYSTEM AND CONNECTIVE TISSUE: ICD-10-CM

## 2023-12-27 DIAGNOSIS — Z82.62 FAMILY HISTORY OF OSTEOPOROSIS: ICD-10-CM

## 2023-12-27 DIAGNOSIS — Z78.9 OTHER SPECIFIED HEALTH STATUS: ICD-10-CM

## 2023-12-27 DIAGNOSIS — J45.20 MILD INTERMITTENT ASTHMA, UNCOMPLICATED: ICD-10-CM

## 2023-12-27 DIAGNOSIS — R09.82 POSTNASAL DRIP: ICD-10-CM

## 2023-12-27 PROCEDURE — 99204 OFFICE O/P NEW MOD 45 MIN: CPT

## 2023-12-27 RX ORDER — AZELASTINE HYDROCHLORIDE 137 UG/1
0.1 SPRAY, METERED NASAL
Qty: 30 | Refills: 5 | Status: ACTIVE | COMMUNITY
Start: 2023-12-27 | End: 1900-01-01

## 2023-12-27 RX ORDER — FLUTICASONE PROPIONATE 50 UG/1
50 SPRAY, METERED NASAL
Qty: 1 | Refills: 5 | Status: ACTIVE | COMMUNITY
Start: 2023-12-27 | End: 1900-01-01

## 2023-12-27 RX ORDER — BUDESONIDE AND FORMOTEROL FUMARATE DIHYDRATE 160; 4.5 UG/1; UG/1
160-4.5 AEROSOL RESPIRATORY (INHALATION) TWICE DAILY
Qty: 1 | Refills: 5 | Status: ACTIVE | COMMUNITY
Start: 2023-12-27 | End: 1900-01-01

## 2023-12-27 NOTE — DISCUSSION/SUMMARY
[FreeTextEntry1] : Cough suspect upper airway cough syndrome postnasal drip Component of hyperreactive airway disease Non-smoker Pets 2 cats at home Chest CT November 2023 was reviewed Nasal spray As needed inhalers PFTs

## 2023-12-27 NOTE — HISTORY OF PRESENT ILLNESS
[TextBox_4] : 58 years old presented for above has been complaining of intermittent cough postnasal drip shortness of breath when lying down non-smoker was seen 3 years ago for the cough was given inhaler and nasal spray she denies history of allergies however she reports she had 2 cats at home recently she went to the hospital for chest pain she underwent sinus CAT scan including CT of the chest.  She denies any wheezing.  gerd

## 2023-12-27 NOTE — PHYSICAL EXAM
[No Acute Distress] : no acute distress [Normal Appearance] : normal appearance [No Neck Mass] : no neck mass [Normal Rate/Rhythm] : normal rate/rhythm [Normal S1, S2] : normal s1, s2 [No Murmurs] : no murmurs [No Resp Distress] : no resp distress [Clear to Auscultation Bilaterally] : clear to auscultation bilaterally [No Abnormalities] : no abnormalities [Benign] : benign [Normal Gait] : normal gait [No Clubbing] : no clubbing [No Cyanosis] : no cyanosis [No Edema] : no edema [FROM] : FROM [Normal Color/ Pigmentation] : normal color/ pigmentation [No Focal Deficits] : no focal deficits [Oriented x3] : oriented x3 [Normal Affect] : normal affect [TextBox_11] : pnd

## 2023-12-29 ENCOUNTER — APPOINTMENT (OUTPATIENT)
Dept: CARDIOLOGY | Facility: CLINIC | Age: 58
End: 2023-12-29
Payer: MEDICARE

## 2023-12-29 VITALS
HEIGHT: 61 IN | HEART RATE: 76 BPM | SYSTOLIC BLOOD PRESSURE: 110 MMHG | DIASTOLIC BLOOD PRESSURE: 70 MMHG | WEIGHT: 131 LBS | BODY MASS INDEX: 24.73 KG/M2

## 2023-12-29 PROCEDURE — 99214 OFFICE O/P EST MOD 30 MIN: CPT | Mod: 25

## 2023-12-29 PROCEDURE — 93000 ELECTROCARDIOGRAM COMPLETE: CPT

## 2023-12-29 NOTE — PHYSICAL EXAM
[General Appearance - Well Developed] : well developed [Well Groomed] : well groomed [Normal Appearance] : normal appearance [General Appearance - Well Nourished] : well nourished [No Deformities] : no deformities [General Appearance - In No Acute Distress] : no acute distress [Normal Oral Mucosa] : normal oral mucosa [No Oral Pallor] : no oral pallor [No Oral Cyanosis] : no oral cyanosis [Normal Jugular Venous A Waves Present] : normal jugular venous A waves present [Normal Jugular Venous V Waves Present] : normal jugular venous V waves present [No Jugular Venous Lyons A Waves] : no jugular venous lyons A waves [Respiration, Rhythm And Depth] : normal respiratory rhythm and effort [Exaggerated Use Of Accessory Muscles For Inspiration] : no accessory muscle use [Auscultation Breath Sounds / Voice Sounds] : lungs were clear to auscultation bilaterally [Heart Rate And Rhythm] : heart rate and rhythm were normal [Heart Sounds] : normal S1 and S2 [Murmurs] : no murmurs present [Abdomen Soft] : soft [Abdomen Tenderness] : non-tender [Abdomen Mass (___ Cm)] : no abdominal mass palpated [Nail Clubbing] : no clubbing of the fingernails [Cyanosis, Localized] : no localized cyanosis [Petechial Hemorrhages (___cm)] : no petechial hemorrhages [] : no ischemic changes [Skin Color & Pigmentation] : normal skin color and pigmentation [Oriented To Time, Place, And Person] : oriented to person, place, and time

## 2023-12-31 NOTE — HISTORY OF PRESENT ILLNESS
[FreeTextEntry1] : 59 yo female with pmhx as below is here for a f/up visit 9/20 admitted to [facility] with cp, s/p cath/iFR guided pci of LAD with johanna doing well  [facility] r/put for acs no active cv complains et is stable ros is otherwise as below

## 2023-12-31 NOTE — REASON FOR VISIT
[Follow-Up - From Hospitalization] : follow-up of a recent hospitalization for [FreeTextEntry2] : cad/pci [Symptom and Test Evaluation] : symptom and test evaluation [CV Risk Factors and Non-Cardiac Disease] : CV risk factors and non-cardiac disease [Hyperlipidemia] : hyperlipidemia [Coronary Artery Disease] : coronary artery disease

## 2023-12-31 NOTE — ASSESSMENT
[FreeTextEntry1] : 57 yo female with pmhx and presentation as above cad/pci of lad with johanna dyslipidemia asthma/lupus cont all meds repeat labs  and all hx records reviewed stress echo - low risk cont statin aggressive risk modif diet and act as tolerated rtc 9-12 months.

## 2024-02-13 NOTE — ED ADULT NURSE NOTE - FINAL NURSING ELECTRONIC SIGNATURE
Preventive Care Advice   This is general advice given by our system to help you stay healthy. However, your care team may have specific advice just for you. Please talk to your care team about your preventive care needs.  Nutrition  Eat 5 or more servings of fruits and vegetables each day.  Try wheat bread, brown rice and whole grain pasta (instead of white bread, rice, and pasta).  Get enough calcium and vitamin D. Check the label on foods and aim for 100% of the RDA (recommended daily allowance).  Lifestyle  Exercise at least 150 minutes each week  (30 minutes a day, 5 days a week).  Do muscle strengthening activities 2 days a week. These help control your weight and prevent disease.  No smoking.  Wear sunscreen to prevent skin cancer.  Have a dental exam and cleaning every 6 months.  Yearly exams  See your health care team every year to talk about:  Any changes in your health.  Any medicines your care team has prescribed.  Preventive care, family planning, and ways to prevent chronic diseases.  Shots (vaccines)   HPV shots (up to age 26), if you've never had them before.  Hepatitis B shots (up to age 59), if you've never had them before.  COVID-19 shot: Get this shot when it's due.  Flu shot: Get a flu shot every year.  Tetanus shot: Get a tetanus shot every 10 years.  Pneumococcal, hepatitis A, and RSV shots: Ask your care team if you need these based on your risk.  Shingles shot (for age 50 and up)  General health tests  Diabetes screening:  Starting at age 35, Get screened for diabetes at least every 3 years.  If you are younger than age 35, ask your care team if you should be screened for diabetes.  Cholesterol test: At age 39, start having a cholesterol test every 5 years, or more often if advised.  Bone density scan (DEXA): At age 50, ask your care team if you should have this scan for osteoporosis (brittle bones).  Hepatitis C: Get tested at least once in your life.  STIs (sexually transmitted  infections)  Before age 24: Ask your care team if you should be screened for STIs.  After age 24: Get screened for STIs if you're at risk. You are at risk for STIs (including HIV) if:  You are sexually active with more than one person.  You don't use condoms every time.  You or a partner was diagnosed with a sexually transmitted infection.  If you are at risk for HIV, ask about PrEP medicine to prevent HIV.  Get tested for HIV at least once in your life, whether you are at risk for HIV or not.  Cancer screening tests  Cervical cancer screening: If you have a cervix, begin getting regular cervical cancer screening tests starting at age 21.  Breast cancer scan (mammogram): If you've ever had breasts, begin having regular mammograms starting at age 40. This is a scan to check for breast cancer.  Colon cancer screening: It is important to start screening for colon cancer at age 45.  Have a colonoscopy test every 10 years (or more often if you're at risk) Or, ask your provider about stool tests like a FIT test every year or Cologuard test every 3 years.  To learn more about your testing options, visit:   https://www.[email or web address]/952677.pdf.  For help making a decision, visit:   https://bit.ly/wl80481.  Prostate cancer screening test: If you have a prostate, ask your care team if a prostate cancer screening test (PSA) at age 55 is right for you.  Lung cancer screening: If you are a current or former smoker ages 50 to 80, ask your care team if ongoing lung cancer screenings are right for you.  For informational purposes only. Not to replace the advice of your health care provider. Copyright   2023 [employer]. All rights reserved. Clinically reviewed by the [facility] Transitions Program. [employer] [id number] - REV 01/24.     18-Sep-2021 22:09

## 2024-02-21 ENCOUNTER — APPOINTMENT (OUTPATIENT)
Dept: RHEUMATOLOGY | Facility: CLINIC | Age: 59
End: 2024-02-21
Payer: MEDICARE

## 2024-02-21 VITALS
WEIGHT: 126 LBS | SYSTOLIC BLOOD PRESSURE: 112 MMHG | TEMPERATURE: 98.1 F | BODY MASS INDEX: 23.79 KG/M2 | DIASTOLIC BLOOD PRESSURE: 68 MMHG | HEIGHT: 61 IN | HEART RATE: 87 BPM | OXYGEN SATURATION: 97 %

## 2024-02-21 DIAGNOSIS — M25.552 PAIN IN RIGHT HIP: ICD-10-CM

## 2024-02-21 DIAGNOSIS — M54.50 LOW BACK PAIN, UNSPECIFIED: ICD-10-CM

## 2024-02-21 DIAGNOSIS — M25.561 PAIN IN RIGHT KNEE: ICD-10-CM

## 2024-02-21 DIAGNOSIS — M25.562 PAIN IN RIGHT KNEE: ICD-10-CM

## 2024-02-21 DIAGNOSIS — M79.641 PAIN IN RIGHT HAND: ICD-10-CM

## 2024-02-21 DIAGNOSIS — M25.511 PAIN IN RIGHT SHOULDER: ICD-10-CM

## 2024-02-21 DIAGNOSIS — R76.8 OTHER SPECIFIED ABNORMAL IMMUNOLOGICAL FINDINGS IN SERUM: ICD-10-CM

## 2024-02-21 DIAGNOSIS — M79.672 PAIN IN RIGHT FOOT: ICD-10-CM

## 2024-02-21 DIAGNOSIS — G89.29 PAIN IN RIGHT KNEE: ICD-10-CM

## 2024-02-21 DIAGNOSIS — M79.671 PAIN IN RIGHT FOOT: ICD-10-CM

## 2024-02-21 DIAGNOSIS — M25.512 PAIN IN RIGHT SHOULDER: ICD-10-CM

## 2024-02-21 DIAGNOSIS — G89.29 PAIN IN RIGHT HIP: ICD-10-CM

## 2024-02-21 DIAGNOSIS — M25.551 PAIN IN RIGHT HIP: ICD-10-CM

## 2024-02-21 DIAGNOSIS — M79.642 PAIN IN RIGHT HAND: ICD-10-CM

## 2024-02-21 PROCEDURE — 99204 OFFICE O/P NEW MOD 45 MIN: CPT

## 2024-02-21 NOTE — ASSESSMENT
[FreeTextEntry1] : Musculoskeletal pain: Pt was previously diagnosed with SLE but it is unclear how this diagnosis was made; she did have mild leukopenia on recent labs and also had dsDNA 43 in 021, and SUSAN 1:80, but no other abnormal lab findings for SLE or other systemic connective tissue diseases, and she has e/o degenerative joint disease on her exam at the sites of her pain. Pt does report improvement on hydroxychloroquine before but this medication is not specific for SLE. Pt had c-spine and l-spine x-rays in 5/2023 which demonstrated degenerative changes. - f/u labs for systemic connective tissue diseases - f/u x-rays of c-spine, l-spine, SI joints, hips, knees, feet, hands - Start amitriptyline 10 mg qhs, discussed side effects  f/u in 2 months, will call pt with lab and x-ray results

## 2024-02-21 NOTE — HISTORY OF PRESENT ILLNESS
[FreeTextEntry1] : Pt reports years of low back pain as a result of an injury, which was previously diagnosed as degenerative changes and herniated discs. She receives epidural injections with Dr. Shania Avelar and also takes Percocet. Around age 54, pt was also diagnosed with SLE after she presented with unclear symptoms - arm and leg pain? She saw Dr. Dottie Reed at the time. She took hydroxychloroquine and it helped with her pain but she developed diarrhea so she discontinued it. In the past 6 months, pt has developed severe worsening of joint pain in her arms and legs, worse in the PIPs, also in the shoulders, with cracking and pain in the hips and knees, but overall the pain remains the worst in her low back. + New headaches for the past week. + Blurry vision in the past month, pt says she had an eye exam and was told that it is due to lupus? She saw Dr. Reed again, and was started on a infusion for 3 months, which pt says helped but she discontinued it because she did not like to receive an infusion. Last infusion was November 2023. She is now taking the Percocet as well as Motrin and cyclobenzaprine but continues to experience severe pain, constant, not worse at any particular time of the day. Denies insomnia. + Hives recently which last for a few hours and them improve.  Previous treatments:  - Duloxetine - side effects - Hydroxychloroquine - helped with pain but caused diarrhea - Gabapentin - side effects - Took Lyrica for many years for low back pain, discontinued because of side effects? - Epidural injections with pain management - Percocet -currently using - Cyclobenzaprine - Currently uses Motrin but has h/o CAD  Physical exam: GEN: AAO woman sitting on exam table in NAD SKIN: no rashes currently HEAD: no alopecia MOUTH: Moist mucous membranes, no oral ulcers, normal oral aperture PULM: Clear to auscultation b/l CV: Regular rate and rhythm, no murmurs MSK: Neck: Slightly limited ROM, no pain with ROM Shoulders: + Pain with ROM b/l, limited ROM to approx 170 degrees b/l Elbows: + TTP in R lateral epicondyle, full ROM b/l, no effusions Wrists: Full ROM b/l, no effusions Hands: + TTP in PIPs b/l, + Lennie's nodes and Heberden's nodes Hips: Full ROM b/l Knees: + Crepitus b/l, +? valgus deformities b/l, full ROM b/l Ankles: no effusions, full ROM b/l Feet: no effusions, no TTP EXT: normal nailfold capillaries, no LE edema b/l

## 2024-02-26 ENCOUNTER — LABORATORY RESULT (OUTPATIENT)
Age: 59
End: 2024-02-26

## 2024-03-01 LAB
ALBUMIN SERPL ELPH-MCNC: 4.9 G/DL
ALP BLD-CCNC: 70 U/L
ALT SERPL-CCNC: 14 U/L
ANA PAT FLD IF-IMP: ABNORMAL
ANA SER IF-ACNC: ABNORMAL
ANION GAP SERPL CALC-SCNC: 15 MMOL/L
APPEARANCE: CLEAR
AST SERPL-CCNC: 21 U/L
B2 GLYCOPROT1 IGG SER-ACNC: <5 SGU
B2 GLYCOPROT1 IGM SER-ACNC: <5 SMU
BACTERIA: NEGATIVE /HPF
BILIRUB SERPL-MCNC: 0.6 MG/DL
BILIRUBIN URINE: NEGATIVE
BLOOD URINE: NEGATIVE
BUN SERPL-MCNC: 14 MG/DL
C3 SERPL-MCNC: 122 MG/DL
C4 SERPL-MCNC: 27 MG/DL
CALCIUM SERPL-MCNC: 10.4 MG/DL
CARDIOLIPIN IGM SER-MCNC: <5 GPL
CARDIOLIPIN IGM SER-MCNC: <5 MPL
CAST: 0 /LPF
CCP AB SER IA-ACNC: <8 UNITS
CHLORIDE SERPL-SCNC: 102 MMOL/L
CK SERPL-CCNC: 199 U/L
CO2 SERPL-SCNC: 24 MMOL/L
COLOR: YELLOW
CREAT SERPL-MCNC: 0.8 MG/DL
CREAT SPEC-SCNC: 68 MG/DL
CREAT/PROT UR: 0.2 RATIO
CRP SERPL-MCNC: <3 MG/L
DSDNA AB SER-ACNC: 22 IU/ML
EGFR: 85 ML/MIN/1.73M2
ENA RNP AB SER IA-ACNC: 0.3 AL
ENA SM AB SER IA-ACNC: <0.2 AL
ENA SS-A AB SER IA-ACNC: <0.2 AL
ENA SS-B AB SER IA-ACNC: <0.2 AL
EPITHELIAL CELLS: 2 /HPF
ERYTHROCYTE [SEDIMENTATION RATE] IN BLOOD BY WESTERGREN METHOD: 9 MM/HR
GLUCOSE QUALITATIVE U: NEGATIVE MG/DL
GLUCOSE SERPL-MCNC: 83 MG/DL
HBV CORE IGG+IGM SER QL: NONREACTIVE
HBV CORE IGM SER QL: NONREACTIVE
HCV AB SER QL: NONREACTIVE
HCV S/CO RATIO: 0.08 S/CO
HLA-B27 QL NAA+PROBE: NORMAL
KETONES URINE: NEGATIVE MG/DL
LEUKOCYTE ESTERASE URINE: ABNORMAL
MICROSCOPIC-UA: NORMAL
NITRITE URINE: NEGATIVE
PH URINE: 6.5
POTASSIUM SERPL-SCNC: 4.4 MMOL/L
PROT SERPL-MCNC: 7.8 G/DL
PROT UR-MCNC: 12 MG/DLG/24H
PROTEIN URINE: NEGATIVE MG/DL
RED BLOOD CELLS URINE: 2 /HPF
RF+CCP IGG SER-IMP: NEGATIVE
RHEUMATOID FACT SER QL: <10 IU/ML
SODIUM SERPL-SCNC: 141 MMOL/L
SPECIFIC GRAVITY URINE: 1.02
T4 FREE SERPL-MCNC: 1.3 NG/DL
TSH SERPL-ACNC: 0.79 UIU/ML
UROBILINOGEN URINE: 0.2 MG/DL
WHITE BLOOD CELLS URINE: 3 /HPF

## 2024-03-21 ENCOUNTER — NON-APPOINTMENT (OUTPATIENT)
Age: 59
End: 2024-03-21

## 2024-04-19 ENCOUNTER — APPOINTMENT (OUTPATIENT)
Dept: CARDIOLOGY | Facility: CLINIC | Age: 59
End: 2024-04-19
Payer: MEDICARE

## 2024-04-19 VITALS
DIASTOLIC BLOOD PRESSURE: 70 MMHG | HEIGHT: 61 IN | BODY MASS INDEX: 24.35 KG/M2 | SYSTOLIC BLOOD PRESSURE: 100 MMHG | HEART RATE: 75 BPM | WEIGHT: 129 LBS

## 2024-04-19 DIAGNOSIS — E78.5 HYPERLIPIDEMIA, UNSPECIFIED: ICD-10-CM

## 2024-04-19 DIAGNOSIS — Z98.61 ATHEROSCLEROTIC HEART DISEASE OF NATIVE CORONARY ARTERY W/OUT ANGINA PECTORIS: ICD-10-CM

## 2024-04-19 DIAGNOSIS — I25.10 ATHEROSCLEROTIC HEART DISEASE OF NATIVE CORONARY ARTERY W/OUT ANGINA PECTORIS: ICD-10-CM

## 2024-04-19 PROCEDURE — 99214 OFFICE O/P EST MOD 30 MIN: CPT | Mod: 25

## 2024-04-19 PROCEDURE — 93000 ELECTROCARDIOGRAM COMPLETE: CPT

## 2024-04-25 ENCOUNTER — APPOINTMENT (OUTPATIENT)
Dept: RHEUMATOLOGY | Facility: CLINIC | Age: 59
End: 2024-04-25
Payer: MEDICARE

## 2024-04-25 VITALS
OXYGEN SATURATION: 97 % | HEIGHT: 59.84 IN | WEIGHT: 131 LBS | HEART RATE: 103 BPM | DIASTOLIC BLOOD PRESSURE: 72 MMHG | SYSTOLIC BLOOD PRESSURE: 108 MMHG | BODY MASS INDEX: 25.72 KG/M2

## 2024-04-25 LAB
CHOLEST SERPL-MCNC: 145 MG/DL
CK SERPL-CCNC: 176 U/L
HDLC SERPL-MCNC: 56 MG/DL
LDLC SERPL CALC-MCNC: 71 MG/DL
NONHDLC SERPL-MCNC: 89 MG/DL
TRIGL SERPL-MCNC: 92 MG/DL

## 2024-04-25 PROCEDURE — 99214 OFFICE O/P EST MOD 30 MIN: CPT

## 2024-04-25 RX ORDER — AMITRIPTYLINE HYDROCHLORIDE 10 MG/1
10 TABLET, FILM COATED ORAL
Qty: 30 | Refills: 2 | Status: DISCONTINUED | COMMUNITY
Start: 2024-02-21 | End: 2024-04-25

## 2024-04-25 NOTE — HISTORY OF PRESENT ILLNESS
[FreeTextEntry1] : Pt continues to experience severe pain, she is not sure if in her bones or her joints, but she has pain in her hands, arms, legs, back. She fell down recently because of her leg pain. She tried taking the amitriptyline and she noticed it made her feel "fuzzy" and quiet in the morning, she hasn't noticed any improvement in her pain. She has been taking 2 tablets of Percocet at a time due to the pain.   Previous HPI: Pt reports years of low back pain as a result of an injury, which was previously diagnosed as degenerative changes and herniated discs. She receives epidural injections with Dr. Shania Avelar and also takes Percocet. Around age 54, pt was also diagnosed with SLE after she presented with unclear symptoms - arm and leg pain? She saw Dr. Dottie Reed at the time. She took hydroxychloroquine and it helped with her pain but she developed diarrhea so she discontinued it. In the past 6 months, pt has developed severe worsening of joint pain in her arms and legs, worse in the PIPs, also in the shoulders, with cracking and pain in the hips and knees, but overall the pain remains the worst in her low back. + New headaches for the past week. + Blurry vision in the past month, pt says she had an eye exam and was told that it is due to lupus? She saw Dr. Reed again, and was started on a infusion for 3 months, which pt says helped but she discontinued it because she did not like to receive an infusion. Last infusion was November 2023. She is now taking the Percocet as well as Motrin and cyclobenzaprine but continues to experience severe pain, constant, not worse at any particular time of the day. Denies insomnia. + Hives recently which last for a few hours and them improve.  Previous treatments:  - Duloxetine - side effects - Hydroxychloroquine - helped with pain but caused diarrhea - Gabapentin - side effects - Took Lyrica for many years for low back pain, discontinued because of side effects? - Epidural injections with pain management - Percocet -currently using - Cyclobenzaprine - Currently uses Motrin but has h/o CAD - Benlysta infusions - ?helped? but pt stopped because she did not want to keep going for infusions  Physical exam: GEN: AAO woman sitting on exam table in NAD SKIN: no rashes currently HEAD: no alopecia MOUTH: Moist mucous membranes, no oral ulcers, normal oral aperture PULM: Clear to auscultation b/l CV: Regular rate and rhythm, no murmurs MSK: Neck: Slightly limited ROM, no pain with ROM Shoulders: + Pain with ROM b/l, limited ROM to approx 170 degrees b/l Elbows: + TTP in R lateral epicondyle, full ROM b/l, no effusions Wrists: Full ROM b/l, no effusions Hands: + TTP in PIPs b/l, + Lennie's nodes and Heberden's nodes Hips: Full ROM b/l Knees: + Crepitus b/l, +? valgus deformities b/l, full ROM b/l Ankles: no effusions, full ROM b/l Feet: no effusions, no TTP EXT: normal nailfold capillaries, no LE edema b/l

## 2024-04-25 NOTE — ASSESSMENT
[FreeTextEntry1] : OA, ?fibromyalgia: Pt was previously diagnosed with SLE but it is unclear how this diagnosis was made; she did have mild leukopenia on recent labs and also had dsDNA 43 in 021, and SUSAN 1:80, but no other abnormal lab findings for SLE or other systemic connective tissue diseases, and she has e/o degenerative joint disease on her exam at the sites of her pain. Repeat labs in 2/2024 demonstrated SUSAN 1:320 with negative dsDNA, Sanchez, RNP, normal complements. Pt does report improvement on hydroxychloroquine before but this medication is not specific for SLE. Pt had c-spine and l-spine x-rays in 5/2023 which demonstrated degenerative changes, also had x-rays of shoulders demonstrating AC OA, hands demonstrating hand OA, knees with early OA, L-spine with O. Her foot and hip x-rays were unremarkable. - Switch amitriptyline to nortriptyline given that pt had "fuzzy" side effect with amitriptyline - Would consider possibly trying a low dose of prn prednisone as a last resort?  - Also discussed the possibility of trying Benlysta again with pt since it helped her in the past, but she does not wish to restart it at this time  f/u in 6 weeks

## 2024-06-07 ENCOUNTER — APPOINTMENT (OUTPATIENT)
Dept: RHEUMATOLOGY | Facility: CLINIC | Age: 59
End: 2024-06-07
Payer: MEDICARE

## 2024-06-07 VITALS
SYSTOLIC BLOOD PRESSURE: 110 MMHG | BODY MASS INDEX: 25.6 KG/M2 | WEIGHT: 127 LBS | TEMPERATURE: 97.3 F | HEIGHT: 59 IN | HEART RATE: 101 BPM | DIASTOLIC BLOOD PRESSURE: 75 MMHG

## 2024-06-07 DIAGNOSIS — M54.16 RADICULOPATHY, LUMBAR REGION: ICD-10-CM

## 2024-06-07 PROCEDURE — 99214 OFFICE O/P EST MOD 30 MIN: CPT

## 2024-06-07 RX ORDER — NORTRIPTYLINE HYDROCHLORIDE 10 MG/1
10 CAPSULE ORAL
Qty: 30 | Refills: 2 | Status: DISCONTINUED | COMMUNITY
Start: 2024-04-25 | End: 2024-06-07

## 2024-06-07 RX ORDER — DULOXETINE HYDROCHLORIDE 30 MG/1
30 CAPSULE, DELAYED RELEASE PELLETS ORAL
Qty: 30 | Refills: 2 | Status: ACTIVE | COMMUNITY
Start: 2024-06-07 | End: 1900-01-01

## 2024-06-07 NOTE — ASSESSMENT
[FreeTextEntry1] : OA, ?fibromyalgia: Pt was previously diagnosed with SLE but it is unclear how this diagnosis was made; she did have mild leukopenia on recent labs and also had dsDNA 43 in 021, and SUSAN 1:80, but no other abnormal lab findings for SLE or other systemic connective tissue diseases, and she has e/o degenerative joint disease on her exam at the sites of her pain. Repeat labs in 2/2024 demonstrated SUSAN 1:320 with negative dsDNA, Sanchez, RNP, normal complements. Pt does report improvement on hydroxychloroquine before but this medication is not specific for SLE. Pt had c-spine and l-spine x-rays in 5/2023 which demonstrated degenerative changes, also had x-rays of shoulders demonstrating AC OA, hands demonstrating hand OA, knees with early OA, L-spine with O. Her foot and hip x-rays were unremarkable.  - Start duloxetine 30 mg q day, pt previously said she had side effects with duloxetine but now says that she didn't try it and would like to try it - Would consider possibly trying a low dose of prn prednisone as a last resort?  - Also discussed the possibility of trying Benlysta again with pt since it helped her in the past, but she does not wish to restart it at this time - f/u MRI L-spine, pt has not had one done, no improvement with PT and conservative treatment so far  f/u in 2-3 months, will call pt with MRI results

## 2024-06-14 ENCOUNTER — NON-APPOINTMENT (OUTPATIENT)
Age: 59
End: 2024-06-14

## 2024-09-05 NOTE — ED PROVIDER NOTE - ATTENDING CONTRIBUTION TO CARE
PROVIDER:[TOKEN:[[id number]],FOLLOWUP:[1-3 Days]] 55 yo F pmh of CAD, HTN, HLD on plavix presents with bruising to the left arm. States that she felt a pain to the area that then turned into a bump and 1 hour ago noted some bruising to the area. Only located to the left upper extremity, no trauma to the area. no other bruising on her body. no unusual bleeding. no similar episodes in the past. States that it is no longer painful.     CONSTITUTIONAL: Well-developed; well-nourished; in no acute distress.   SKIN: warm, dry. + 2cm hematoma to the left upper extremity.   HEAD: Normocephalic; atraumatic.  EXT: Normal ROM.  5/5 strength in all 4 extremities   LYMPH: No acute cervical adenopathy.  NEURO: Alert, oriented, grossly unremarkable. neurovascularly intact Discharged

## 2024-09-25 ENCOUNTER — RX RENEWAL (OUTPATIENT)
Age: 59
End: 2024-09-25

## 2025-01-24 ENCOUNTER — NON-APPOINTMENT (OUTPATIENT)
Age: 60
End: 2025-01-24

## 2025-01-24 ENCOUNTER — APPOINTMENT (OUTPATIENT)
Dept: CARDIOLOGY | Facility: CLINIC | Age: 60
End: 2025-01-24
Payer: MEDICARE

## 2025-01-24 DIAGNOSIS — E78.5 HYPERLIPIDEMIA, UNSPECIFIED: ICD-10-CM

## 2025-01-24 DIAGNOSIS — J45.20 MILD INTERMITTENT ASTHMA, UNCOMPLICATED: ICD-10-CM

## 2025-01-24 DIAGNOSIS — Z98.61 ATHEROSCLEROTIC HEART DISEASE OF NATIVE CORONARY ARTERY W/OUT ANGINA PECTORIS: ICD-10-CM

## 2025-01-24 DIAGNOSIS — I25.10 ATHEROSCLEROTIC HEART DISEASE OF NATIVE CORONARY ARTERY W/OUT ANGINA PECTORIS: ICD-10-CM

## 2025-01-24 PROCEDURE — 93000 ELECTROCARDIOGRAM COMPLETE: CPT

## 2025-01-24 PROCEDURE — 99213 OFFICE O/P EST LOW 20 MIN: CPT | Mod: 25

## 2025-01-24 RX ORDER — NALOXEGOL OXALATE 12.5 MG/1
12.5 TABLET, FILM COATED ORAL
Refills: 0 | Status: ACTIVE | COMMUNITY

## 2025-03-10 NOTE — ED ADULT TRIAGE NOTE - AS TEMP SITE
Pt ready for discharge. Discharge education was provided to pt by this RN. Pt verbalized understanding & all questions were answered. IV removed. Pt AoX 4. Pt ambulated out of the ED with all belongings. Pt encouraged to come back if symptoms worsen.     oral

## 2025-05-20 ENCOUNTER — NON-APPOINTMENT (OUTPATIENT)
Age: 60
End: 2025-05-20

## 2025-05-20 ENCOUNTER — APPOINTMENT (OUTPATIENT)
Dept: CARDIOLOGY | Facility: CLINIC | Age: 60
End: 2025-05-20
Payer: MEDICARE

## 2025-05-20 VITALS
BODY MASS INDEX: 26.21 KG/M2 | HEIGHT: 59 IN | HEART RATE: 96 BPM | SYSTOLIC BLOOD PRESSURE: 110 MMHG | DIASTOLIC BLOOD PRESSURE: 70 MMHG | WEIGHT: 130 LBS | OXYGEN SATURATION: 97 %

## 2025-05-20 DIAGNOSIS — M79.672 PAIN IN RIGHT FOOT: ICD-10-CM

## 2025-05-20 DIAGNOSIS — I25.10 ATHEROSCLEROTIC HEART DISEASE OF NATIVE CORONARY ARTERY W/OUT ANGINA PECTORIS: ICD-10-CM

## 2025-05-20 DIAGNOSIS — Z98.61 ATHEROSCLEROTIC HEART DISEASE OF NATIVE CORONARY ARTERY W/OUT ANGINA PECTORIS: ICD-10-CM

## 2025-05-20 DIAGNOSIS — M79.604 PAIN IN RIGHT LEG: ICD-10-CM

## 2025-05-20 DIAGNOSIS — M79.671 PAIN IN RIGHT FOOT: ICD-10-CM

## 2025-05-20 DIAGNOSIS — E78.5 HYPERLIPIDEMIA, UNSPECIFIED: ICD-10-CM

## 2025-05-20 DIAGNOSIS — M79.605 PAIN IN RIGHT LEG: ICD-10-CM

## 2025-05-20 PROCEDURE — 99214 OFFICE O/P EST MOD 30 MIN: CPT

## 2025-05-20 PROCEDURE — G2211 COMPLEX E/M VISIT ADD ON: CPT

## 2025-05-29 ENCOUNTER — APPOINTMENT (OUTPATIENT)
Dept: CARDIOLOGY | Facility: CLINIC | Age: 60
End: 2025-05-29
Payer: MEDICARE

## 2025-05-29 PROCEDURE — 93880 EXTRACRANIAL BILAT STUDY: CPT

## 2025-05-29 PROCEDURE — 93925 LOWER EXTREMITY STUDY: CPT

## 2025-05-30 ENCOUNTER — APPOINTMENT (OUTPATIENT)
Dept: CARDIOLOGY | Facility: CLINIC | Age: 60
End: 2025-05-30

## 2025-06-03 ENCOUNTER — APPOINTMENT (OUTPATIENT)
Dept: CARDIOLOGY | Facility: CLINIC | Age: 60
End: 2025-06-03
Payer: MEDICARE

## 2025-06-03 ENCOUNTER — NON-APPOINTMENT (OUTPATIENT)
Age: 60
End: 2025-06-03

## 2025-06-03 VITALS
DIASTOLIC BLOOD PRESSURE: 70 MMHG | HEIGHT: 59 IN | SYSTOLIC BLOOD PRESSURE: 116 MMHG | WEIGHT: 133 LBS | BODY MASS INDEX: 26.81 KG/M2

## 2025-06-03 DIAGNOSIS — M79.671 PAIN IN RIGHT FOOT: ICD-10-CM

## 2025-06-03 DIAGNOSIS — M79.604 PAIN IN RIGHT LEG: ICD-10-CM

## 2025-06-03 DIAGNOSIS — Z98.61 ATHEROSCLEROTIC HEART DISEASE OF NATIVE CORONARY ARTERY W/OUT ANGINA PECTORIS: ICD-10-CM

## 2025-06-03 DIAGNOSIS — M54.50 LOW BACK PAIN, UNSPECIFIED: ICD-10-CM

## 2025-06-03 DIAGNOSIS — M54.16 RADICULOPATHY, LUMBAR REGION: ICD-10-CM

## 2025-06-03 DIAGNOSIS — I25.10 ATHEROSCLEROTIC HEART DISEASE OF NATIVE CORONARY ARTERY W/OUT ANGINA PECTORIS: ICD-10-CM

## 2025-06-03 DIAGNOSIS — M79.672 PAIN IN RIGHT FOOT: ICD-10-CM

## 2025-06-03 DIAGNOSIS — M79.605 PAIN IN RIGHT LEG: ICD-10-CM

## 2025-06-03 DIAGNOSIS — E78.5 HYPERLIPIDEMIA, UNSPECIFIED: ICD-10-CM

## 2025-06-03 PROCEDURE — 99214 OFFICE O/P EST MOD 30 MIN: CPT

## 2025-06-20 ENCOUNTER — APPOINTMENT (OUTPATIENT)
Dept: ORTHOPEDIC SURGERY | Facility: CLINIC | Age: 60
End: 2025-06-20
Payer: MEDICARE

## 2025-06-20 ENCOUNTER — APPOINTMENT (OUTPATIENT)
Dept: CARDIOLOGY | Facility: CLINIC | Age: 60
End: 2025-06-20

## 2025-06-20 PROCEDURE — 73630 X-RAY EXAM OF FOOT: CPT | Mod: 50

## 2025-06-20 PROCEDURE — 99204 OFFICE O/P NEW MOD 45 MIN: CPT

## 2025-07-03 ENCOUNTER — APPOINTMENT (OUTPATIENT)
Dept: PAIN MANAGEMENT | Facility: CLINIC | Age: 60
End: 2025-07-03

## 2025-07-03 PROCEDURE — 99204 OFFICE O/P NEW MOD 45 MIN: CPT

## 2025-07-03 RX ORDER — PREGABALIN 75 MG/1
75 CAPSULE ORAL
Qty: 60 | Refills: 2 | Status: ACTIVE | COMMUNITY
Start: 2025-07-03 | End: 1900-01-01

## 2025-07-25 ENCOUNTER — APPOINTMENT (OUTPATIENT)
Dept: PODIATRY | Facility: CLINIC | Age: 60
End: 2025-07-25
Payer: MEDICARE

## 2025-07-25 DIAGNOSIS — G62.89 OTHER SPECIFIED POLYNEUROPATHIES: ICD-10-CM

## 2025-07-25 DIAGNOSIS — M79.672 PAIN IN RIGHT FOOT: ICD-10-CM

## 2025-07-25 DIAGNOSIS — M79.671 PAIN IN RIGHT FOOT: ICD-10-CM

## 2025-07-25 DIAGNOSIS — M79.7 FIBROMYALGIA: ICD-10-CM

## 2025-07-25 PROCEDURE — 99203 OFFICE O/P NEW LOW 30 MIN: CPT

## 2025-07-25 RX ORDER — LIDOCAINE 5% 700 MG/1
5 PATCH TOPICAL
Qty: 30 | Refills: 3 | Status: ACTIVE | COMMUNITY
Start: 2025-07-25 | End: 1900-01-01

## 2025-07-25 RX ORDER — CAPSAICIN 0.1 G/100G
0.1 CREAM TOPICAL 4 TIMES DAILY
Qty: 1 | Refills: 3 | Status: ACTIVE | COMMUNITY
Start: 2025-07-25 | End: 1900-01-01

## 2025-07-25 RX ORDER — L-METHYLFOLATE-ALGAE-VIT B12-B6 CAP 3-90.314-2-35 MG 3-90.314-2-35 MG
3-90.314-2-35 CAP ORAL
Qty: 90 | Refills: 3 | Status: ACTIVE | COMMUNITY
Start: 2025-07-25 | End: 1900-01-01

## 2025-07-28 PROBLEM — M79.7 FIBROMYALGIA: Status: ACTIVE | Noted: 2025-07-28

## 2025-07-28 PROBLEM — G62.89 DISEASE RELATED PERIPHERAL NEUROPATHY: Status: ACTIVE | Noted: 2025-07-28

## 2025-07-28 RX ORDER — PREGABALIN 150 MG/1
150 CAPSULE ORAL TWICE DAILY
Qty: 180 | Refills: 0 | Status: ACTIVE | COMMUNITY
Start: 2025-07-28 | End: 1900-01-01

## 2025-07-31 ENCOUNTER — APPOINTMENT (OUTPATIENT)
Dept: PAIN MANAGEMENT | Facility: CLINIC | Age: 60
End: 2025-07-31
Payer: MEDICARE

## 2025-07-31 DIAGNOSIS — M79.604 PAIN IN RIGHT LEG: ICD-10-CM

## 2025-07-31 DIAGNOSIS — M79.2 NEURALGIA AND NEURITIS, UNSPECIFIED: ICD-10-CM

## 2025-07-31 DIAGNOSIS — M79.605 PAIN IN RIGHT LEG: ICD-10-CM

## 2025-07-31 DIAGNOSIS — G57.92 UNSPECIFIED MONONEUROPATHY OF LEFT LOWER LIMB: ICD-10-CM

## 2025-07-31 DIAGNOSIS — G57.91 UNSPECIFIED MONONEUROPATHY OF RIGHT LOWER LIMB: ICD-10-CM

## 2025-07-31 PROCEDURE — 99214 OFFICE O/P EST MOD 30 MIN: CPT

## 2025-07-31 RX ORDER — PREGABALIN 50 MG/1
50 CAPSULE ORAL
Qty: 60 | Refills: 1 | Status: ACTIVE | COMMUNITY
Start: 2025-07-31 | End: 1900-01-01

## 2025-09-02 ENCOUNTER — APPOINTMENT (OUTPATIENT)
Dept: PAIN MANAGEMENT | Facility: CLINIC | Age: 60
End: 2025-09-02

## 2025-09-04 ENCOUNTER — APPOINTMENT (OUTPATIENT)
Dept: PAIN MANAGEMENT | Facility: CLINIC | Age: 60
End: 2025-09-04

## 2025-09-05 ENCOUNTER — APPOINTMENT (OUTPATIENT)
Dept: CARDIOLOGY | Facility: CLINIC | Age: 60
End: 2025-09-05